# Patient Record
Sex: MALE | Race: WHITE | Employment: OTHER | URBAN - METROPOLITAN AREA
[De-identification: names, ages, dates, MRNs, and addresses within clinical notes are randomized per-mention and may not be internally consistent; named-entity substitution may affect disease eponyms.]

---

## 2017-03-13 ENCOUNTER — HOSPITAL ENCOUNTER (INPATIENT)
Age: 72
LOS: 4 days | Discharge: NURSING, REHAB OR LONG-TERM CARE | DRG: 291 | End: 2017-03-17
Attending: EMERGENCY MEDICINE | Admitting: INTERNAL MEDICINE
Payer: MEDICARE

## 2017-03-13 ENCOUNTER — APPOINTMENT (OUTPATIENT)
Dept: GENERAL RADIOLOGY | Age: 72
DRG: 291 | End: 2017-03-13
Attending: EMERGENCY MEDICINE
Payer: MEDICARE

## 2017-03-13 ENCOUNTER — APPOINTMENT (OUTPATIENT)
Dept: CT IMAGING | Age: 72
DRG: 291 | End: 2017-03-13
Attending: EMERGENCY MEDICINE
Payer: MEDICARE

## 2017-03-13 DIAGNOSIS — I67.89 CEREBRAL MICROVASCULAR DISEASE: ICD-10-CM

## 2017-03-13 DIAGNOSIS — J44.9 COPD WITH HYPOXIA (HCC): Primary | ICD-10-CM

## 2017-03-13 DIAGNOSIS — I50.43 ACUTE ON CHRONIC COMBINED SYSTOLIC AND DIASTOLIC CONGESTIVE HEART FAILURE (HCC): ICD-10-CM

## 2017-03-13 DIAGNOSIS — R53.1 GENERALIZED WEAKNESS: ICD-10-CM

## 2017-03-13 DIAGNOSIS — R41.0 DISORIENTATION: ICD-10-CM

## 2017-03-13 DIAGNOSIS — J90 BILATERAL PLEURAL EFFUSION: ICD-10-CM

## 2017-03-13 DIAGNOSIS — R09.02 COPD WITH HYPOXIA (HCC): Primary | ICD-10-CM

## 2017-03-13 DIAGNOSIS — R53.81 DEBILITY: ICD-10-CM

## 2017-03-13 DIAGNOSIS — I65.23 STENOSIS OF BOTH INTERNAL CAROTID ARTERIES: ICD-10-CM

## 2017-03-13 LAB
ALBUMIN SERPL BCP-MCNC: 2.9 G/DL (ref 3.5–5)
ALBUMIN/GLOB SERPL: 1 {RATIO} (ref 1.1–2.2)
ALP SERPL-CCNC: 78 U/L (ref 45–117)
ALT SERPL-CCNC: 37 U/L (ref 12–78)
ANION GAP BLD CALC-SCNC: 5 MMOL/L (ref 5–15)
APPEARANCE UR: CLEAR
APTT PPP: 27.3 SEC (ref 22.1–32.5)
AST SERPL W P-5'-P-CCNC: 28 U/L (ref 15–37)
BACTERIA URNS QL MICRO: ABNORMAL /HPF
BASOPHILS # BLD AUTO: 0 K/UL (ref 0–0.1)
BASOPHILS # BLD: 0 % (ref 0–1)
BILIRUB SERPL-MCNC: 0.4 MG/DL (ref 0.2–1)
BILIRUB UR QL: NEGATIVE
BNP SERPL-MCNC: 9930 PG/ML (ref 0–125)
BUN SERPL-MCNC: 38 MG/DL (ref 6–20)
BUN/CREAT SERPL: 27 (ref 12–20)
CALCIUM SERPL-MCNC: 8.6 MG/DL (ref 8.5–10.1)
CHLORIDE SERPL-SCNC: 104 MMOL/L (ref 97–108)
CK SERPL-CCNC: 87 U/L (ref 39–308)
CO2 SERPL-SCNC: 34 MMOL/L (ref 21–32)
COLOR UR: ABNORMAL
CREAT SERPL-MCNC: 1.4 MG/DL (ref 0.7–1.3)
EOSINOPHIL # BLD: 0 K/UL (ref 0–0.4)
EOSINOPHIL NFR BLD: 0 % (ref 0–7)
EPITH CASTS URNS QL MICRO: ABNORMAL /LPF
ERYTHROCYTE [DISTWIDTH] IN BLOOD BY AUTOMATED COUNT: 16.5 % (ref 11.5–14.5)
GLOBULIN SER CALC-MCNC: 2.9 G/DL (ref 2–4)
GLUCOSE BLD STRIP.AUTO-MCNC: 214 MG/DL (ref 65–100)
GLUCOSE SERPL-MCNC: 240 MG/DL (ref 65–100)
GLUCOSE UR STRIP.AUTO-MCNC: NEGATIVE MG/DL
GRAN CASTS URNS QL MICRO: ABNORMAL /LPF
HCT VFR BLD AUTO: 39.1 % (ref 36.6–50.3)
HGB BLD-MCNC: 11.9 G/DL (ref 12.1–17)
HGB UR QL STRIP: NEGATIVE
HYALINE CASTS URNS QL MICRO: ABNORMAL /LPF (ref 0–5)
INR PPP: 1.2 (ref 0.9–1.1)
KETONES UR QL STRIP.AUTO: NEGATIVE MG/DL
LEUKOCYTE ESTERASE UR QL STRIP.AUTO: NEGATIVE
LYMPHOCYTES # BLD AUTO: 10 % (ref 12–49)
LYMPHOCYTES # BLD: 0.9 K/UL (ref 0.8–3.5)
MAGNESIUM SERPL-MCNC: 2.1 MG/DL (ref 1.6–2.4)
MCH RBC QN AUTO: 30.7 PG (ref 26–34)
MCHC RBC AUTO-ENTMCNC: 30.4 G/DL (ref 30–36.5)
MCV RBC AUTO: 100.8 FL (ref 80–99)
MONOCYTES # BLD: 0.8 K/UL (ref 0–1)
MONOCYTES NFR BLD AUTO: 9 % (ref 5–13)
NEUTS SEG # BLD: 6.8 K/UL (ref 1.8–8)
NEUTS SEG NFR BLD AUTO: 81 % (ref 32–75)
NITRITE UR QL STRIP.AUTO: NEGATIVE
PH UR STRIP: 5 [PH] (ref 5–8)
PLATELET # BLD AUTO: 200 K/UL (ref 150–400)
POTASSIUM SERPL-SCNC: 5.7 MMOL/L (ref 3.5–5.1)
PROT SERPL-MCNC: 5.8 G/DL (ref 6.4–8.2)
PROT UR STRIP-MCNC: ABNORMAL MG/DL
PROTHROMBIN TIME: 12.3 SEC (ref 9–11.1)
RBC # BLD AUTO: 3.88 M/UL (ref 4.1–5.7)
RBC #/AREA URNS HPF: ABNORMAL /HPF (ref 0–5)
RHEUMATOID FACT SERPL-ACNC: <10 IU/ML
SERVICE CMNT-IMP: ABNORMAL
SODIUM SERPL-SCNC: 143 MMOL/L (ref 136–145)
SP GR UR REFRACTOMETRY: 1.02 (ref 1–1.03)
THERAPEUTIC RANGE,PTTT: NORMAL SECS (ref 58–77)
TROPONIN I SERPL-MCNC: 0.08 NG/ML
TROPONIN I SERPL-MCNC: 0.1 NG/ML
UROBILINOGEN UR QL STRIP.AUTO: 0.2 EU/DL (ref 0.2–1)
WBC # BLD AUTO: 8.4 K/UL (ref 4.1–11.1)
WBC URNS QL MICRO: ABNORMAL /HPF (ref 0–4)

## 2017-03-13 PROCEDURE — 74011250637 HC RX REV CODE- 250/637: Performed by: EMERGENCY MEDICINE

## 2017-03-13 PROCEDURE — 81001 URINALYSIS AUTO W/SCOPE: CPT | Performed by: EMERGENCY MEDICINE

## 2017-03-13 PROCEDURE — 99285 EMERGENCY DEPT VISIT HI MDM: CPT

## 2017-03-13 PROCEDURE — 85730 THROMBOPLASTIN TIME PARTIAL: CPT | Performed by: EMERGENCY MEDICINE

## 2017-03-13 PROCEDURE — 83735 ASSAY OF MAGNESIUM: CPT | Performed by: EMERGENCY MEDICINE

## 2017-03-13 PROCEDURE — 80053 COMPREHEN METABOLIC PANEL: CPT | Performed by: EMERGENCY MEDICINE

## 2017-03-13 PROCEDURE — 96374 THER/PROPH/DIAG INJ IV PUSH: CPT

## 2017-03-13 PROCEDURE — 70450 CT HEAD/BRAIN W/O DYE: CPT

## 2017-03-13 PROCEDURE — 86431 RHEUMATOID FACTOR QUANT: CPT | Performed by: EMERGENCY MEDICINE

## 2017-03-13 PROCEDURE — 74011250636 HC RX REV CODE- 250/636: Performed by: INTERNAL MEDICINE

## 2017-03-13 PROCEDURE — 82962 GLUCOSE BLOOD TEST: CPT

## 2017-03-13 PROCEDURE — 82550 ASSAY OF CK (CPK): CPT | Performed by: EMERGENCY MEDICINE

## 2017-03-13 PROCEDURE — 85025 COMPLETE CBC W/AUTO DIFF WBC: CPT | Performed by: EMERGENCY MEDICINE

## 2017-03-13 PROCEDURE — 65660000000 HC RM CCU STEPDOWN

## 2017-03-13 PROCEDURE — 71010 XR CHEST PORT: CPT

## 2017-03-13 PROCEDURE — 86038 ANTINUCLEAR ANTIBODIES: CPT | Performed by: EMERGENCY MEDICINE

## 2017-03-13 PROCEDURE — 93005 ELECTROCARDIOGRAM TRACING: CPT

## 2017-03-13 PROCEDURE — 84484 ASSAY OF TROPONIN QUANT: CPT | Performed by: EMERGENCY MEDICINE

## 2017-03-13 PROCEDURE — 74011000250 HC RX REV CODE- 250: Performed by: EMERGENCY MEDICINE

## 2017-03-13 PROCEDURE — 85610 PROTHROMBIN TIME: CPT | Performed by: EMERGENCY MEDICINE

## 2017-03-13 PROCEDURE — 74011250636 HC RX REV CODE- 250/636: Performed by: EMERGENCY MEDICINE

## 2017-03-13 PROCEDURE — 83880 ASSAY OF NATRIURETIC PEPTIDE: CPT | Performed by: EMERGENCY MEDICINE

## 2017-03-13 PROCEDURE — 74011000250 HC RX REV CODE- 250: Performed by: INTERNAL MEDICINE

## 2017-03-13 PROCEDURE — 36415 COLL VENOUS BLD VENIPUNCTURE: CPT | Performed by: EMERGENCY MEDICINE

## 2017-03-13 PROCEDURE — 77030029684 HC NEB SM VOL KT MONA -A

## 2017-03-13 RX ORDER — FUROSEMIDE 40 MG/1
20 TABLET ORAL DAILY
Status: DISCONTINUED | OUTPATIENT
Start: 2017-03-14 | End: 2017-03-13

## 2017-03-13 RX ORDER — METOPROLOL TARTRATE 25 MG/1
12.5 TABLET, FILM COATED ORAL 2 TIMES DAILY
Status: DISCONTINUED | OUTPATIENT
Start: 2017-03-14 | End: 2017-03-14

## 2017-03-13 RX ORDER — INSULIN LISPRO 100 [IU]/ML
INJECTION, SOLUTION INTRAVENOUS; SUBCUTANEOUS
Status: DISCONTINUED | OUTPATIENT
Start: 2017-03-13 | End: 2017-03-17 | Stop reason: HOSPADM

## 2017-03-13 RX ORDER — ENOXAPARIN SODIUM 100 MG/ML
40 INJECTION SUBCUTANEOUS EVERY 24 HOURS
Status: DISCONTINUED | OUTPATIENT
Start: 2017-03-13 | End: 2017-03-17 | Stop reason: HOSPADM

## 2017-03-13 RX ORDER — NALOXONE HYDROCHLORIDE 0.4 MG/ML
0.4 INJECTION, SOLUTION INTRAMUSCULAR; INTRAVENOUS; SUBCUTANEOUS AS NEEDED
Status: DISCONTINUED | OUTPATIENT
Start: 2017-03-13 | End: 2017-03-17 | Stop reason: HOSPADM

## 2017-03-13 RX ORDER — LANOLIN ALCOHOL/MO/W.PET/CERES
400 CREAM (GRAM) TOPICAL DAILY
Status: DISCONTINUED | OUTPATIENT
Start: 2017-03-14 | End: 2017-03-14

## 2017-03-13 RX ORDER — MEXILETINE HYDROCHLORIDE 150 MG/1
150 CAPSULE ORAL 2 TIMES DAILY
COMMUNITY

## 2017-03-13 RX ORDER — METFORMIN HYDROCHLORIDE 500 MG/1
TABLET ORAL 2 TIMES DAILY WITH MEALS
Status: ON HOLD | COMMUNITY
End: 2017-03-14 | Stop reason: CLARIF

## 2017-03-13 RX ORDER — ACETAMINOPHEN 325 MG/1
650 TABLET ORAL
Status: DISCONTINUED | OUTPATIENT
Start: 2017-03-13 | End: 2017-03-17 | Stop reason: HOSPADM

## 2017-03-13 RX ORDER — SODIUM POLYSTYRENE SULFONATE 15 G/60ML
15 SUSPENSION ORAL; RECTAL
Status: COMPLETED | OUTPATIENT
Start: 2017-03-13 | End: 2017-03-13

## 2017-03-13 RX ORDER — ASCORBIC ACID 1500 MG
1500 TABLET, EXTENDED RELEASE ORAL 2 TIMES DAILY
COMMUNITY

## 2017-03-13 RX ORDER — AMIODARONE HYDROCHLORIDE 200 MG/1
200 TABLET ORAL DAILY
Status: DISCONTINUED | OUTPATIENT
Start: 2017-03-14 | End: 2017-03-17 | Stop reason: HOSPADM

## 2017-03-13 RX ORDER — QUINIDINE GLUCONATE 324 MG/1
324 TABLET, EXTENDED RELEASE ORAL DAILY
Status: DISCONTINUED | OUTPATIENT
Start: 2017-03-14 | End: 2017-03-14

## 2017-03-13 RX ORDER — HYDROCODONE BITARTRATE AND ACETAMINOPHEN 7.5; 325 MG/1; MG/1
1 TABLET ORAL
Status: DISCONTINUED | OUTPATIENT
Start: 2017-03-13 | End: 2017-03-14

## 2017-03-13 RX ORDER — FUROSEMIDE 10 MG/ML
20 INJECTION INTRAMUSCULAR; INTRAVENOUS EVERY 12 HOURS
Status: DISCONTINUED | OUTPATIENT
Start: 2017-03-14 | End: 2017-03-15

## 2017-03-13 RX ORDER — MEXILETINE HYDROCHLORIDE 150 MG/1
150 CAPSULE ORAL 2 TIMES DAILY
Status: DISCONTINUED | OUTPATIENT
Start: 2017-03-14 | End: 2017-03-17 | Stop reason: HOSPADM

## 2017-03-13 RX ORDER — ONDANSETRON 2 MG/ML
4 INJECTION INTRAMUSCULAR; INTRAVENOUS
Status: DISCONTINUED | OUTPATIENT
Start: 2017-03-13 | End: 2017-03-17 | Stop reason: HOSPADM

## 2017-03-13 RX ORDER — CHOLECALCIFEROL (VITAMIN D3) 125 MCG
1 CAPSULE ORAL DAILY
Status: ON HOLD | COMMUNITY
End: 2017-03-14

## 2017-03-13 RX ORDER — PANTOPRAZOLE SODIUM 40 MG/1
40 TABLET, DELAYED RELEASE ORAL
Status: DISCONTINUED | OUTPATIENT
Start: 2017-03-14 | End: 2017-03-14

## 2017-03-13 RX ORDER — GLIMEPIRIDE 4 MG/1
4 TABLET ORAL
Status: DISCONTINUED | OUTPATIENT
Start: 2017-03-14 | End: 2017-03-13

## 2017-03-13 RX ORDER — QUINIDINE GLUCONATE 324 MG/1
648 TABLET, EXTENDED RELEASE ORAL 2 TIMES DAILY
COMMUNITY
End: 2017-03-17

## 2017-03-13 RX ORDER — BISACODYL 5 MG
5 TABLET, DELAYED RELEASE (ENTERIC COATED) ORAL DAILY PRN
Status: DISCONTINUED | OUTPATIENT
Start: 2017-03-13 | End: 2017-03-17 | Stop reason: HOSPADM

## 2017-03-13 RX ORDER — MELATONIN
2000 DAILY
Status: DISCONTINUED | OUTPATIENT
Start: 2017-03-14 | End: 2017-03-14

## 2017-03-13 RX ORDER — GLIMEPIRIDE 4 MG/1
4 TABLET ORAL
COMMUNITY

## 2017-03-13 RX ORDER — HYDROCODONE BITARTRATE AND ACETAMINOPHEN 7.5; 325 MG/1; MG/1
1 TABLET ORAL
Status: ON HOLD | COMMUNITY
End: 2017-03-14

## 2017-03-13 RX ORDER — FUROSEMIDE 10 MG/ML
20 INJECTION INTRAMUSCULAR; INTRAVENOUS
Status: COMPLETED | OUTPATIENT
Start: 2017-03-13 | End: 2017-03-13

## 2017-03-13 RX ORDER — ALBUTEROL SULFATE 0.83 MG/ML
5 SOLUTION RESPIRATORY (INHALATION)
Status: COMPLETED | OUTPATIENT
Start: 2017-03-13 | End: 2017-03-13

## 2017-03-13 RX ORDER — DEXTROSE 50 % IN WATER (D50W) INTRAVENOUS SYRINGE
12.5-25 AS NEEDED
Status: DISCONTINUED | OUTPATIENT
Start: 2017-03-13 | End: 2017-03-17 | Stop reason: HOSPADM

## 2017-03-13 RX ORDER — LISINOPRIL 2.5 MG/1
2.5 TABLET ORAL DAILY
COMMUNITY
End: 2017-03-17

## 2017-03-13 RX ORDER — SODIUM CHLORIDE 0.9 % (FLUSH) 0.9 %
5-10 SYRINGE (ML) INJECTION EVERY 8 HOURS
Status: DISCONTINUED | OUTPATIENT
Start: 2017-03-13 | End: 2017-03-17 | Stop reason: HOSPADM

## 2017-03-13 RX ORDER — FUROSEMIDE 10 MG/ML
20 INJECTION INTRAMUSCULAR; INTRAVENOUS EVERY 12 HOURS
Status: DISCONTINUED | OUTPATIENT
Start: 2017-03-13 | End: 2017-03-13 | Stop reason: SDUPTHER

## 2017-03-13 RX ORDER — LISINOPRIL 5 MG/1
2.5 TABLET ORAL DAILY
Status: DISCONTINUED | OUTPATIENT
Start: 2017-03-14 | End: 2017-03-13

## 2017-03-13 RX ORDER — AMIODARONE HYDROCHLORIDE 200 MG/1
200 TABLET ORAL DAILY
COMMUNITY

## 2017-03-13 RX ORDER — BISMUTH SUBSALICYLATE 262 MG
1 TABLET,CHEWABLE ORAL DAILY
COMMUNITY

## 2017-03-13 RX ORDER — FUROSEMIDE 20 MG/1
20 TABLET ORAL DAILY
COMMUNITY

## 2017-03-13 RX ORDER — SODIUM CHLORIDE 0.9 % (FLUSH) 0.9 %
5-10 SYRINGE (ML) INJECTION AS NEEDED
Status: DISCONTINUED | OUTPATIENT
Start: 2017-03-13 | End: 2017-03-17 | Stop reason: HOSPADM

## 2017-03-13 RX ORDER — ESOMEPRAZOLE MAGNESIUM 40 MG/1
40 CAPSULE, DELAYED RELEASE ORAL DAILY
Status: ON HOLD | COMMUNITY
End: 2017-03-14

## 2017-03-13 RX ORDER — GUAIFENESIN 100 MG/5ML
81 LIQUID (ML) ORAL DAILY
Status: DISCONTINUED | OUTPATIENT
Start: 2017-03-14 | End: 2017-03-17 | Stop reason: HOSPADM

## 2017-03-13 RX ORDER — IPRATROPIUM BROMIDE AND ALBUTEROL SULFATE 2.5; .5 MG/3ML; MG/3ML
3 SOLUTION RESPIRATORY (INHALATION)
Status: DISCONTINUED | OUTPATIENT
Start: 2017-03-13 | End: 2017-03-15

## 2017-03-13 RX ORDER — MAGNESIUM SULFATE 100 %
4 CRYSTALS MISCELLANEOUS AS NEEDED
Status: DISCONTINUED | OUTPATIENT
Start: 2017-03-13 | End: 2017-03-17 | Stop reason: HOSPADM

## 2017-03-13 RX ORDER — LANOLIN ALCOHOL/MO/W.PET/CERES
800 CREAM (GRAM) TOPICAL DAILY
Status: ON HOLD | COMMUNITY
End: 2017-03-14 | Stop reason: SDDI

## 2017-03-13 RX ORDER — GUAIFENESIN 100 MG/5ML
81 LIQUID (ML) ORAL DAILY
COMMUNITY

## 2017-03-13 RX ORDER — VITAMIN E 268 MG
400 CAPSULE ORAL DAILY
COMMUNITY

## 2017-03-13 RX ADMIN — ALBUTEROL SULFATE 5 MG: 2.5 SOLUTION RESPIRATORY (INHALATION) at 17:07

## 2017-03-13 RX ADMIN — ENOXAPARIN SODIUM 40 MG: 40 INJECTION SUBCUTANEOUS at 23:22

## 2017-03-13 RX ADMIN — INSULIN LISPRO 2 UNITS: 100 INJECTION, SOLUTION INTRAVENOUS; SUBCUTANEOUS at 23:21

## 2017-03-13 RX ADMIN — SODIUM POLYSTYRENE SULFONATE 15 G: 15 SUSPENSION ORAL; RECTAL at 19:24

## 2017-03-13 RX ADMIN — IPRATROPIUM BROMIDE AND ALBUTEROL SULFATE 3 ML: .5; 3 SOLUTION RESPIRATORY (INHALATION) at 23:24

## 2017-03-13 RX ADMIN — FUROSEMIDE 20 MG: 10 INJECTION, SOLUTION INTRAMUSCULAR; INTRAVENOUS at 19:29

## 2017-03-13 NOTE — ED PROVIDER NOTES
HPI Comments: Vladislav Melissa is a 66 y.o. male with PMHx significant for heart failure, DM, COPD, and asthma who presents via EMS to the ED c/o fatigue and weakness x ~3 weeks after being discharged from [facility] [place] [facility]. Pt presents with additional sx's of diarrhea x 2 days and hallucinations  x 4-5 months. EMS reports that pt currently has a defibrillator placed that is made by [employer]. Pt notes that he has not felt his defibrillator go off in the past 6-8 months. Pt reports that his last episode of diarrhea was yesterday, and states that he has not had a bowel movement today PTA. At baseline, pt reports that he has trouble walking, and is dependent on support for movement. Pt also reports that he saw \"a young girl who wouldn't leave his house\" this morning at home PTA. Pt reports that there was nobody else in the house during that time, and notes that he has seen this \"girl\" ~15 times in the last 4-5 months in different episodes of hallucinations. Pt also notes that he only sees the girl during the [place] morning\" time period of the day. Pt specifically denies cough, fever, chills, chest pain, dysuria, hematuria, frequency, headache, ABD pain, leg pain, and back pain. PCP: Kathryn Guzman NP  Social Hx: (-) tobacco usage, (+) EtOH intake       There are no other complaints, changes or physical findings at this time. The history is provided by the patient and the EMS personnel. No  was used. Past Medical History:   Diagnosis Date    Asthma     COPD    [address] [address]     Heart failure ([facility] [address]        Past Surgical History:   Procedure Laterality Date    HX PACEMAKER           History reviewed. No pertinent family history. Social History     Social History    Marital status: SINGLE     Spouse name: N/A    Number of children: N/A    Years of education: N/A     Occupational History    Not on file.      Social History Main Topics    Smoking status: Former Smoker    Smokeless tobacco: Not on file    Alcohol use No    Drug use: No    Sexual activity: Not on file     Other Topics Concern    Not on file     Social History Narrative    No narrative on file         ALLERGIES: Review of patient's allergies indicates no known allergies. Review of Systems   Constitutional: Positive for fatigue. Negative for chills and fever. HENT: Negative for congestion. Eyes: Negative. Respiratory: Negative for cough. Cardiovascular: Negative for chest pain. Gastrointestinal: Positive for diarrhea. Negative for abdominal pain. Endocrine: Negative for heat intolerance. Genitourinary: Negative. Negative for dysuria, frequency and hematuria. Musculoskeletal: Negative for back pain and myalgias. Skin: Negative for rash. Allergic/Immunologic: Negative for immunocompromised state. Neurological: Positive for weakness. Negative for headaches. Hematological: Does not bruise/bleed easily. Psychiatric/Behavioral: Positive for hallucinations. All other systems reviewed and are negative. Patient Vitals for the past 12 hrs:   Temp Pulse Resp BP SpO2   03/13/17 2100 - 71 18 124/63 95 %   03/13/17 2045 - 69 20 124/50 98 %   03/13/17 2030 - 73 18 122/74 96 %   03/13/17 2000 - 70 19 115/63 94 %   03/13/17 1945 - 70 19 115/62 97 %   03/13/17 1930 - 72 12 116/63 98 %   03/13/17 1929 - 71 - 114/58 -   03/13/17 1845 - 69 19 113/57 98 %   03/13/17 1815 - 71 18 111/66 96 %   03/13/17 1800 - 69 16 118/64 96 %   03/13/17 1745 - 68 15 117/64 99 %   03/13/17 1715 - 64 19 110/62 99 %   03/13/17 1707 - 64 15 111/64 99 %   03/13/17 1615 - 66 20 110/59 96 %   03/13/17 1545 - 67 21 105/60 96 %   03/13/17 1540 - - - - (!) 88 %   03/13/17 1530 - 70 22 105/58 90 %   03/13/17 1510 97.6 °F (36.4 °C) 65 18 107/60 91 %        Physical Exam   Constitutional: He is oriented to person, place, and time. He appears well-developed and well-nourished. No distress.    HENT:   Head: Normocephalic and atraumatic. Poor dentition. Eyes: EOM are normal. Pupils are equal, round, and reactive to light. Neck: Normal range of motion. Neck supple. No tenderness in the neck. Cardiovascular: Normal rate and regular rhythm. Murmur heard. Systolic murmur is present with a grade of 2/6   Pulmonary/Chest: Effort normal and breath sounds normal. He has no wheezes. Abdominal: Soft. Bowel sounds are normal. There is no tenderness. Musculoskeletal:   Trace edema in BL legs with no tenderness. Decreased ROM in BL legs. Neurological: He is alert and oriented to person, place, and time. No cranial nerve deficit. Pt has equal  in BL hands. Skin: Skin is warm and dry. Psychiatric: He has a normal mood and affect. Nursing note and vitals reviewed. MDM  Number of Diagnoses or Management Options  Bilateral pleural effusion:   COPD with hypoxia [facility]):   Diagnosis management comments:     DDx: dehydration, electrolyte abnormality, anemia, CHF, COPD, pneumonia, CAD, UTI       Amount and/or Complexity of Data Reviewed  Clinical lab tests: ordered and reviewed  Tests in the radiology section of CPT®: ordered and reviewed  Tests in the medicine section of CPT®: ordered and reviewed  Obtain history from someone other than the patient: yes (EMS)  Review and summarize past medical records: yes  Discuss the patient with other providers: yes (Cardiology, hospitalist)  Independent visualization of images, tracings, or specimens: yes    Critical Care  Total time providing critical care: 30-74 minutes    Patient Progress  Patient progress: stable    ED Course       Procedures      EKG interpretation: (Preliminary) at 1524 by Walker Fleming MD  Rhythm: Ventricular paced rhythm; and irregular. Rate (approx.): 67; Axis: normal; WY interval: normal; QRS interval: normal ; ST/T wave: normal; Other findings: no prior EKGs.       PROGRESS NOTE  3:53 PM  Per notes from [facility], pt was admitted to [facility] on 2/22/17 with ventricular tachycardia. EF was 35-40%. Pt is a former smoker. At [facility], pt had a normal kidney function test. CMP was also normal. Hemoglobin was 9.4 on 3/1/17. Pt also reports that he had a cardiac ablation in early January 2017. Written by RAMÓN Johnson, as dictated by Chloe Hillman MD.    CONSULT NOTE:   4:30 PM  Chloe Hillman MD spoke with ,  Specialty: cardiology  Discussed pt's hx, disposition, and available diagnostic and imaging results. Reviewed care plans. Consultant agrees with plans as outlined. Consultant recommends to check JUANITA and rheumatoid factors as pt has been on quinidine for a long time, which could contribute to pt's sx of weakness. Written by RAMÓN Johnson, as dictated by Chloe Hillman MD.    PROGRESS NOTE  8:00 PM  Pt was re-assessed. Pt's saturation levels are better. Pt states that he is unable to stand up. Written by RAMÓN Johnson, as dictated by Chloe Hillman MD.    CONSULT NOTE:   8:00 PM  Chloe Hillman MD spoke with Nilton Crum,   Specialty: Hospitalist  Discussed pt's hx, disposition, and available diagnostic and imaging results. Reviewed care plans. Consultant will admit pt. Written by RAMÓN Johnson, as dictated by Chloe Hillman MD.    CRITICAL CARE NOTE :    8:00 PM    IMPENDING DETERIORATION -Respiratory, Metabolic and Renal    ASSOCIATED RISK FACTORS - Hypoxia, Dysrhythmia, Metabolic changes and Dehydration    MANAGEMENT- Bedside Assessment and Supervision of Care    INTERPRETATION -  Xrays, CT Scan, ECG and Blood Pressure    INTERVENTIONS - hemodynamic mngmt and Metobolic interventions    CASE REVIEW - Hospitalist, Medical Sub-Specialist, Nursing and Family    TREATMENT RESPONSE -Improved    PERFORMED BY - Self        NOTES   :      I have spent 45 minutes of critical care time involved in lab review, consultations with specialist, family decision- making, bedside attention and documentation.  During this entire length of time I was immediately available to the patient . Jaylin Man MD    LABORATORY TESTS:  Recent Results (from the past 12 hour(s))   EKG, 12 LEAD, INITIAL    Collection Time: 03/13/17  3:24 PM   Result Value Ref Range    Ventricular Rate 67 BPM    Atrial Rate 67 BPM    QRS Duration 190 ms    Q-T Interval 532 ms    QTC Calculation (Bezet) 562 ms    Calculated P Axis -6 degrees    Calculated R Axis 158 degrees    Calculated T Axis -26 degrees    Diagnosis       Ventricular-paced rhythm  Abnormal ECG  When compared with ECG of 31-AUG-2007 07:03,  Previous ECG has undetermined rhythm, needs review     CBC WITH AUTOMATED DIFF    Collection Time: 03/13/17  4:04 PM   Result Value Ref Range    WBC 8.4 4.1 - 11.1 K/uL    RBC 3.88 (L) 4.10 - 5.70 M/uL    HGB 11.9 (L) 12.1 - 17.0 g/dL    HCT 39.1 36.6 - 50.3 %    .8 (H) 80.0 - 99.0 FL    MCH 30.7 26.0 - 34.0 PG    MCHC 30.4 30.0 - 36.5 g/dL    RDW 16.5 (H) 11.5 - 14.5 %    PLATELET 201 852 - 868 K/uL    NEUTROPHILS 81 (H) 32 - 75 %    LYMPHOCYTES 10 (L) 12 - 49 %    MONOCYTES 9 5 - 13 %    EOSINOPHILS 0 0 - 7 %    BASOPHILS 0 0 - 1 %    ABS. NEUTROPHILS 6.8 1.8 - 8.0 K/UL    ABS. LYMPHOCYTES 0.9 0.8 - 3.5 K/UL    ABS. MONOCYTES 0.8 0.0 - 1.0 K/UL    ABS. EOSINOPHILS 0.0 0.0 - 0.4 K/UL    ABS. BASOPHILS 0.0 0.0 - 0.1 K/UL   METABOLIC PANEL, COMPREHENSIVE    Collection Time: 03/13/17  4:04 PM   Result Value Ref Range    Sodium 143 136 - 145 mmol/L    Potassium 5.7 (H) 3.5 - 5.1 mmol/L    Chloride 104 97 - 108 mmol/L    CO2 34 (H) 21 - 32 mmol/L    Anion gap 5 5 - 15 mmol/L    Glucose 240 (H) 65 - 100 mg/dL    BUN 38 (H) 6 - 20 MG/DL    Creatinine 1.40 (H) 0.70 - 1.30 MG/DL    BUN/Creatinine ratio 27 (H) 12 - 20      GFR est AA >60 >60 ml/min/1.73m2    GFR est non-AA 50 (L) >60 ml/min/1.73m2    Calcium 8.6 8.5 - 10.1 MG/DL    Bilirubin, total 0.4 0.2 - 1.0 MG/DL    ALT (SGPT) 37 12 - 78 U/L    AST (SGOT) 28 15 - 37 U/L    Alk.  phosphatase 78 45 - 117 U/L    Protein, total 5.8 (L) 6.4 - 8.2 g/dL    Albumin 2.9 (L) 3.5 - 5.0 g/dL    Globulin 2.9 2.0 - 4.0 g/dL    A-G Ratio 1.0 (L) 1.1 - 2.2     CK    Collection Time: 03/13/17  4:04 PM   Result Value Ref Range    CK 87 39 - 308 U/L   TROPONIN I    Collection Time: 03/13/17  4:04 PM   Result Value Ref Range    Troponin-I, Qt. 0.10 (H) <0.05 ng/mL   PRO-BNP    Collection Time: 03/13/17  4:04 PM   Result Value Ref Range    NT pro-BNP 9930 (H) 0 - 125 PG/ML   MAGNESIUM    Collection Time: 03/13/17  4:04 PM   Result Value Ref Range    Magnesium 2.1 1.6 - 2.4 mg/dL   PROTHROMBIN TIME + INR    Collection Time: 03/13/17  5:58 PM   Result Value Ref Range    INR 1.2 (H) 0.9 - 1.1      Prothrombin time 12.3 (H) 9.0 - 11.1 sec   PTT    Collection Time: 03/13/17  5:58 PM   Result Value Ref Range    aPTT 27.3 22.1 - 32.5 sec    aPTT, therapeutic range     58.0 - 77.0 SECS   TROPONIN I    Collection Time: 03/13/17  5:58 PM   Result Value Ref Range    Troponin-I, Qt. 0.08 (H) <0.05 ng/mL   URINALYSIS W/ RFLX MICROSCOPIC    Collection Time: 03/13/17  7:05 PM   Result Value Ref Range    Color YELLOW/STRAW      Appearance CLEAR CLEAR      Specific gravity 1.018 1.003 - 1.030      pH (UA) 5.0 5.0 - 8.0      Protein TRACE (A) NEG mg/dL    Glucose NEGATIVE  NEG mg/dL    Ketone NEGATIVE  NEG mg/dL    Bilirubin NEGATIVE  NEG      Blood NEGATIVE  NEG      Urobilinogen 0.2 0.2 - 1.0 EU/dL    Nitrites NEGATIVE  NEG      Leukocyte Esterase NEGATIVE  NEG      WBC 0-4 0 - 4 /hpf    RBC 0-5 0 - 5 /hpf    Epithelial cells FEW FEW /lpf    Bacteria 1+ (A) NEG /hpf    Hyaline cast 2-5 0 - 5 /lpf    Granular cast 0-2 (A) NEG /lpf       IMAGING RESULTS:  XR CHEST PORT   Final Result   EXAM: XR CHEST PORT     INDICATION: Chest Pain     COMPARISON: 9/1/2007     FINDINGS: 2 AP portable radiographs of the chest were obtained at 1828 hours. Small layering bilateral pleural effusions are shown.  There is pulmonary venous  congestion with minimal bibasilar patchy pulmonary opacification, greater on the  left, of nonspecific nature. There is no pneumothorax. Mild cardiac contour  enlargement is shown. No mediastinal or hilar enlargement is demonstrated. A  left axillary AICD device is again demonstrated appearing unchanged.      IMPRESSION  IMPRESSION: Small bilateral pleural effusions. Nonspecific ill-defined bibasilar  pulmonary densities, greater on the left. CT HEAD WO CONT   Final Result   INDICATION: Fatigue, weakness, presentation to emergency department.     COMPARISON: None     EXAM: Unenhanced axial CT imaging of the head is obtained with coronal and  sagittal reformations. CT dose reduction was achieved through use of a  standardized protocol tailored for this examination and automatic exposure  control for dose modulation.      FINDINGS:     There is mild prominence of the ventricles and cortical sulci consistent with  atrophy. Minimal-mild bilateral cerebral periventricular diminished attenuation  is shown consistent with chronic small vessel ischemic disease of the white  matter. There is no unenhanced CT imaging evidence for acute infarction, mass or  intracranial hemorrhage. No extra-axial collection is shown. No osseous  abnormality is demonstrated. The orbits, visualized paranasal sinuses and  mastoid air cells appear normal.     IMPRESSION  IMPRESSION: Mild atrophy. No acute findings.        MEDICATIONS GIVEN:  Medications   sodium chloride (NS) flush 5-10 mL (not administered)   sodium chloride (NS) flush 5-10 mL (not administered)   acetaminophen (TYLENOL) tablet 650 mg (not administered)   naloxone (NARCAN) injection 0.4 mg (not administered)   ondansetron (ZOFRAN) injection 4 mg (not administered)   bisacodyl (DULCOLAX) tablet 5 mg (not administered)   enoxaparin (LOVENOX) injection 40 mg (not administered)   aspirin chewable tablet 81 mg (not administered)   amiodarone (CORDARONE) tablet 200 mg (not administered)   cholecalciferol (VITAMIN D3) tablet 2,000 Units (not administered)   pantoprazole (PROTONIX) tablet 40 mg (not administered)   glimepiride (AMARYL) tablet 4 mg (not administered)   HYDROcodone-acetaminophen (NORCO) 7.5-325 mg per tablet 1 Tab (not administered)   lisinopril (PRINIVIL, ZESTRIL) tablet 2.5 mg (not administered)   magnesium oxide (MAG-OX) tablet 400 mg (not administered)   metoprolol tartrate (LOPRESSOR) tablet 12.5 mg (not administered)   mexiletine (MEXITIL) capsule 150 mg (not administered)   quiNIDine gluconate SR (DURA-TABS) 324 mg tablet 324 mg (not administered)   salmeterol (SEREVENT) 50 mcg inhaler (not administered)   linagliptin (TRADJENTA) tablet 5 mg (not administered)   insulin lispro (HUMALOG) injection (not administered)   glucose chewable tablet 16 g (not administered)   dextrose (D50W) injection syrg 12.5-25 g (not administered)   glucagon (GLUCAGEN) injection 1 mg (not administered)   furosemide (LASIX) injection 20 mg (not administered)   albuterol-ipratropium (DUO-NEB) 2.5 MG-0.5 MG/3 ML (not administered)   albuterol (PROVENTIL VENTOLIN) nebulizer solution 5 mg (5 mg Nebulization Given 3/13/17 1707)   sodium polystyrene (KAYEXALATE) 15 gram/60 mL oral suspension 15 g (15 g Oral Given 3/13/17 1924)   furosemide (LASIX) injection 20 mg (20 mg IntraVENous Given 3/13/17 1929)       IMPRESSION:  1. COPD with hypoxia ([address]    2. Stenosis of both internal carotid arteries    3. Cerebral microvascular disease    4. Generalized weakness    5. Bilateral pleural effusion        PLAN: Admit pt to hospital.    Admit Note:  8:00 PM  Patient is being admitted to the hospital by Drake Vargas. The results of their tests and reasons for their admission have been discussed with the patient and/or available family. They convey their agreement and understanding for the need to be admitted and for their admission diagnosis.    Written by RAMÓN Verdugo, as dictated by Shan Hayes MD.    This note is prepared by Suleiman Parker, acting as Scribe for Alexandria Andrade MD.    Alexandria Andrade MD: The scribe's documentation has been prepared under my direction and personally reviewed by me in its entirety. I confirm that the note above accurately reflects all work, treatment, procedures, and medical decision making performed by me.

## 2017-03-13 NOTE — ED NOTES
Assumed care of pt from EMS stretcher. Pt comes from home with c/o increasing weakness x 3 weeks. Per EMS pt had recent hospitalization at [facility] approximately 3 weeks ago and had recent defibrillator replacement 1 week ago at [facility]. Also per EMS pt's family states pt has had recent hallucinations over the last 3 weeks. Pt currently A&Ox3. Pt placed on cardiac monitor x3. VSS. Pt in position of comfort with no signs of acute distress noted.

## 2017-03-13 NOTE — IP AVS SNAPSHOT
Current Discharge Medication List  
  
CONTINUE these medications which have CHANGED Dose & Instructions Dispensing Information Comments Morning Noon Evening Bedtime  
 quiNIDine gluconate  mg SR tablet Commonly known as:  DURA-TABS What changed:  Another medication with the same name was removed. Continue taking this medication, and follow the directions you see here. Your last dose was: Your next dose is:    
   
   
 Dose:  324 mg Take 324 mg by mouth daily. Refills:  0 CONTINUE these medications which have NOT CHANGED Dose & Instructions Dispensing Information Comments Morning Noon Evening Bedtime  
 amiodarone 200 mg tablet Commonly known as:  CORDARONE Your last dose was: Your next dose is:    
   
   
 Dose:  200 mg Take 200 mg by mouth daily. Refills:  0  
     
   
   
   
  
 ascorbic acid (vitamin C) 1,500 mg Tber Your last dose was: Your next dose is:    
   
   
 Dose:  1500 mg Take 1,500 mg by mouth two (2) times a day. Refills:  0  
     
   
   
   
  
 aspirin 81 mg chewable tablet Your last dose was: Your next dose is:    
   
   
 Dose:  81 mg Take 81 mg by mouth daily. Refills:  0  
     
   
   
   
  
 CALCIUM 600 + D 600-125 mg-unit Tab Generic drug:  calcium-cholecalciferol (d3) Your last dose was: Your next dose is:    
   
   
 Dose:  1 Tab Take 1 Tab by mouth daily. Refills:  0  
     
   
   
   
  
 glimepiride 4 mg tablet Commonly known as:  AMARYL Your last dose was: Your next dose is:    
   
   
 Dose:  4 mg Take 4 mg by mouth every morning. Refills:  0 GLUCOSAMINE COMPLEX PO Your last dose was: Your next dose is:    
   
   
 Dose:  1 Tab Take 1 Tab by mouth daily. Refills:  0 JANUVIA 100 mg tablet Generic drug:  SITagliptin Your last dose was: Your next dose is:    
   
   
 Dose:  100 mg Take 100 mg by mouth daily. Refills:  0  
     
   
   
   
  
 LASIX 20 mg tablet Generic drug:  furosemide Your last dose was: Your next dose is:    
   
   
 Dose:  20 mg Take 20 mg by mouth daily. Refills:  0  
     
   
   
   
  
 metFORMIN 1,000 mg tablet Commonly known as:  GLUCOPHAGE Your last dose was: Your next dose is:    
   
   
 Dose:  1000 mg Take 1,000 mg by mouth two (2) times daily (with meals). Refills:  0  
     
   
   
   
  
 metoprolol succinate 25 mg XL tablet Commonly known as:  TOPROL-XL Your last dose was: Your next dose is:    
   
   
 Dose:  12.5 mg Take 0.5 Tabs by mouth daily. Quantity:  30 Tab Refills:  0  
     
   
   
   
  
 mexiletine 150 mg capsule Commonly known as:  MEXITIL Your last dose was: Your next dose is:    
   
   
 Dose:  150 mg Take 150 mg by mouth two (2) times a day. Refills:  0  
     
   
   
   
  
 multivitamin tablet Commonly known as:  ONE A DAY Your last dose was: Your next dose is:    
   
   
 Dose:  1 Tab Take 1 Tab by mouth daily. Refills:  0  
     
   
   
   
  
 omega 3-dha-epa-fish oil 360-1,200 mg Cpdr  
   
Your last dose was: Your next dose is:    
   
   
 Dose:  2400 mg Take 2,400 mg by mouth three (3) times daily. Refills:  0  
     
   
   
   
  
 UBIQUINONE PO Your last dose was: Your next dose is:    
   
   
 Dose:  300 mg Take 300 mg by mouth daily. Refills:  0  
     
   
   
   
  
 vitamin E 400 unit capsule Commonly known as:  [address] Your last dose was: Your next dose is:    
   
   
 Dose:  400 Units Take 400 Units by mouth daily. Refills:  0 STOP taking these medications ALBUTEROL IN  
   
  
 lisinopril 2.5 mg tablet Commonly known as:  Xavire Naranjo  
 magnesium oxide 400 mg tablet Commonly known as:  MAG-OX  
   
  
 OMEGA 3 FISH OIL PO  
   
  
 POTASSIUM CHLORIDE potassium chloride SR 10 mEq tablet Commonly known as:  KLOR-CON 10 ASK your doctor about these medications Dose & Instructions Dispensing Information Comments Morning Noon Evening Bedtime  
 salmeterol 50 mcg/dose diskus inhaler Commonly known as:  SEREVENT Your last dose was: Your next dose is:    
   
   
 Dose:  1 Puff Take 1 Puff by inhalation two (2) times a day. Refills:  0 Where to Get Your Medications Information on where to get these meds will be given to you by the nurse or doctor. ! Ask your nurse or doctor about these medications  
  metoprolol succinate 25 mg XL tablet

## 2017-03-13 NOTE — IP AVS SNAPSHOT
[address] [facility] 
[phone] Patient: Naz Lim MRN: [id number] ODT:[id number] You are allergic to the following No active allergies Recent Documentation Height Weight BMI Smoking Status 1.88 m 71 kg 20.11 kg/m2 Former Smoker Emergency Contacts Name Discharge Info Relation Home Work Mobile [employer] Public [24] [phone] About your hospitalization You were admitted on:  March 13, 2017 You last received care in the:  [facility] 3 NEUROSCIENCE TELEMETRY You were discharged on:  March 17, 2017 Unit phone number:  [phone] Why you were hospitalized Your primary diagnosis was:  Not on File Your diagnoses also included:  Generalized Weakness, Cerebral Microvascular Disease, Stenosis Of Both Internal Carotid Arteries Providers Seen During Your Hospitalizations Provider Role Specialty Primary office phone Cristiana Gilmore MD Attending Provider Emergency Medicine [phone] Monty Duran MD Attending Provider Internal Medicine [phone] Your Primary Care Physician (PCP) Primary Care Physician Office Phone Office Fax Jose Carlos Bhatti [phone] Follow-up Information Follow up With Details Comments Contact Info [facility])  This will be your skilled nursing rehab facility. [place] [id number] [facility]ed with [facility] [address] 
[phone] Gina Alvarez MD On 3/20/2017 You have a cardiology appointment at 11:00 am on Monday March 20, 2017. [address] [place] [id number] 
[phone] Flor Prasad NP   [address] [facility] [address] 
[phone] Current Discharge Medication List  
  
CONTINUE these medications which have CHANGED Dose & Instructions Dispensing Information Comments Morning Noon Evening Bedtime  
 quiNIDine gluconate  mg SR tablet Commonly known as:  DURA-TABS What changed:  Another medication with the same name was removed. Continue taking this medication, and follow the directions you see here. Your last dose was: Your next dose is:    
   
   
 Dose:  324 mg Take 324 mg by mouth daily. Refills:  0 CONTINUE these medications which have NOT CHANGED Dose & Instructions Dispensing Information Comments Morning Noon Evening Bedtime  
 amiodarone 200 mg tablet Commonly known as:  CORDARONE Your last dose was: Your next dose is:    
   
   
 Dose:  200 mg Take 200 mg by mouth daily. Refills:  0  
     
   
   
   
  
 ascorbic acid (vitamin C) 1,500 mg Tber Your last dose was: Your next dose is:    
   
   
 Dose:  1500 mg Take 1,500 mg by mouth two (2) times a day. Refills:  0  
     
   
   
   
  
 aspirin 81 mg chewable tablet Your last dose was: Your next dose is:    
   
   
 Dose:  81 mg Take 81 mg by mouth daily. Refills:  0  
     
   
   
   
  
 CALCIUM 600 + D 600-125 mg-unit Tab Generic drug:  calcium-cholecalciferol (d3) Your last dose was: Your next dose is:    
   
   
 Dose:  1 Tab Take 1 Tab by mouth daily. Refills:  0  
     
   
   
   
  
 glimepiride 4 mg tablet Commonly known as:  AMARYL Your last dose was: Your next dose is:    
   
   
 Dose:  4 mg Take 4 mg by mouth every morning. Refills:  0 GLUCOSAMINE COMPLEX PO Your last dose was: Your next dose is:    
   
   
 Dose:  1 Tab Take 1 Tab by mouth daily. Refills:  0 JANUVIA 100 mg tablet Generic drug:  SITagliptin Your last dose was:     
   
Your next dose is:    
   
   
 Dose:  100 mg  
 Take 100 mg by mouth daily. Refills:  0  
     
   
   
   
  
 LASIX 20 mg tablet Generic drug:  furosemide Your last dose was: Your next dose is:    
   
   
 Dose:  20 mg Take 20 mg by mouth daily. Refills:  0  
     
   
   
   
  
 metFORMIN 1,000 mg tablet Commonly known as:  GLUCOPHAGE Your last dose was: Your next dose is:    
   
   
 Dose:  1000 mg Take 1,000 mg by mouth two (2) times daily (with meals). Refills:  0  
     
   
   
   
  
 metoprolol succinate 25 mg XL tablet Commonly known as:  TOPROL-XL Your last dose was: Your next dose is:    
   
   
 Dose:  12.5 mg Take 0.5 Tabs by mouth daily. Quantity:  30 Tab Refills:  0  
     
   
   
   
  
 mexiletine 150 mg capsule Commonly known as:  MEXITIL Your last dose was: Your next dose is:    
   
   
 Dose:  150 mg Take 150 mg by mouth two (2) times a day. Refills:  0  
     
   
   
   
  
 multivitamin tablet Commonly known as:  ONE A DAY Your last dose was: Your next dose is:    
   
   
 Dose:  1 Tab Take 1 Tab by mouth daily. Refills:  0  
     
   
   
   
  
 omega 3-dha-epa-fish oil 360-1,200 mg Cpdr  
   
Your last dose was: Your next dose is:    
   
   
 Dose:  2400 mg Take 2,400 mg by mouth three (3) times daily. Refills:  0  
     
   
   
   
  
 UBIQUINONE PO Your last dose was: Your next dose is:    
   
   
 Dose:  300 mg Take 300 mg by mouth daily. Refills:  0  
     
   
   
   
  
 vitamin E 400 unit capsule Commonly known as:  [address] Your last dose was: Your next dose is:    
   
   
 Dose:  400 Units Take 400 Units by mouth daily. Refills:  0 STOP taking these medications ALBUTEROL IN  
   
  
 lisinopril 2.5 mg tablet Commonly known as:  PRINIVIL, ZESTRIL  
   
  
 magnesium oxide 400 mg tablet Commonly known as:  MAG-OX OMEGA 3 FISH OIL PO  
   
  
 POTASSIUM CHLORIDE potassium chloride SR 10 mEq tablet Commonly known as:  KLOR-CON 10 ASK your doctor about these medications Dose & Instructions Dispensing Information Comments Morning Noon Evening Bedtime  
 salmeterol 50 mcg/dose diskus inhaler Commonly known as:  SEREVENT Your last dose was: Your next dose is:    
   
   
 Dose:  1 Puff Take 1 Puff by inhalation two (2) times a day. Refills:  0 Where to Get Your Medications Information on where to get these meds will be given to you by the nurse or doctor. ! Ask your nurse or doctor about these medications  
  metoprolol succinate 25 mg XL tablet Discharge Instructions None Discharge Instructions Attachments/References HEART FAILURE ZONES: GENERAL INFO (ENGLISH) HEART FAILURE: AVOIDING TRIGGERS (ENGLISH) HEART FAILURE: SELF-CARE: GENERAL INFO (ENGLISH) Discharge Orders None Bizily Announcement We are excited to announce that we are making your provider's discharge notes available to you in Bizily. You will see these notes when they are completed and signed by the physician that discharged you from your recent hospital stay. If you have any questions or concerns about any information you see in Bizily, please call the Health Information Department where you were seen or reach out to your Primary Care Provider for more information about your plan of care. Introducing [facility] SERVICES! Thiago Hauser introduces Bizily patient portal. Now you can access parts of your medical record, email your doctor's office, and request medication refills online. 1. In your internet browser, go to [email or web address] 2. Click on the First Time User? Click Here link in the Sign In box. You will see the New Member Sign Up page. 3. Enter your Shutter Guardian Access Code exactly as it appears below. You will not need to use this code after youve completed the sign-up process. If you do not sign up before the expiration date, you must request a new code. · Shutter Guardian Access Code: [email or web address] Expires: 6/11/2017  3:41 PM 
 
4. Enter the last four digits of your Social Security Number (xxxx) and Date of Birth (mm/dd/yyyy) as indicated and click Submit. You will be taken to the next sign-up page. 5. Create a Shutter Guardian ID. This will be your Shutter Guardian login ID and cannot be changed, so think of one that is secure and easy to remember. 6. Create a Shutter Guardian password. You can change your password at any time. 7. Enter your Password Reset Question and Answer. This can be used at a later time if you forget your password. 8. Enter your e-mail address. You will receive e-mail notification when new information is available in [address] 9. Click Sign Up. You can now view and download portions of your medical record. 10. Click the Download Summary menu link to download a portable copy of your medical information. If you have questions, please visit the Frequently Asked Questions section of the Shutter Guardian website. Remember, Shutter Guardian is NOT to be used for urgent needs. For medical emergencies, dial 911. Now available from your iPhone and Android! General Information Please provide this summary of care documentation to your next provider. Patient Signature:  ____________________________________________________________ Date:  ____________________________________________________________  
  
[facility] Signature:  ____________________________________________________________ Date:  ____________________________________________________________ More Information Learning About Heart Failure Zones What are heart failure zones? Heart failure zones give you an easy way to see changes in your heart failure symptoms. They also tell you when you need to get help. Check every day to see which zone you are in. Green zone. You are doing well. This is where you want to be. · Your weight is stable. This means it is not going up or down. · You breathe easily. · You are sleeping well. You are able to lie flat without shortness of breath. · You can do your usual activities. Yellow zone. Be careful. Your symptoms are changing. Call your doctor. · You have new or increased shortness of breath. · You are dizzy or lightheaded, or you feel like you may faint. · You have sudden weight gain, such as 3 pounds or more in 2 to 3 days. · You have increased swelling in your legs, ankles, or feet. · You are so tired or weak that you cannot do your usual activities. · You are not sleeping well. Shortness of breath wakes you up at night. You need extra pillows. Your doctor's name: ____________________________________________________________ Your doctor's contact information: _________________________________________________ Red zone. This is an emergency. Call 911. You have symptoms of sudden heart failure, such as: 
· You have severe trouble breathing. · You cough up pink, foamy mucus. · You have a new irregular or fast heartbeat. You have symptoms of a heart attack. These may include: · Chest pain or pressure, or a strange feeling in the chest. 
· Sweating. · Shortness of breath. · Nausea or vomiting. · Pain, pressure, or a strange feeling in the back, neck, jaw, or upper belly or in one or both shoulders or arms. · Lightheadedness or sudden weakness. · A fast or irregular heartbeat. If you have symptoms of a heart attack: After you call 911, the  may tell you to chew 1 adult-strength or 2 to 4 low-dose aspirin. Wait for an ambulance. Do not try to drive yourself. Follow-up care is a key part of your treatment and safety. Be sure to make and go to all appointments, and call your doctor if you are having problems. It's also a good idea to know your test results and keep a list of the medicines you take. Where can you learn more? Go to http://micaela-jozef.info/. Enter T174 in the search box to learn more about \"Learning About Heart Failure Zones. \" Current as of: January 27, 2016 Content Version: 11.1 © 7079-5177 [employer]. Care instructions adapted under license by [employer] (which disclaims liability or warranty for this information). If you have questions about a medical condition or this instruction, always ask your healthcare professional. [address] any warranty or liability for your use of this information. Avoiding Triggers With Heart Failure: Care Instructions Your Care Instructions Triggers are anything that make your heart failure flare up. A flare-up is also called \"sudden heart failure\" or \"acute heart failure. \" When you have a flare-up, fluid builds up in your lungs, and you have problems breathing. You might need to go to the hospital. By watching for changes in your condition and avoiding triggers, you can prevent heart failure flare-ups. Follow-up care is a key part of your treatment and safety. Be sure to make and go to all appointments, and call your doctor if you are having problems. It's also a good idea to know your test results and keep a list of the medicines you take. How can you care for yourself at home? Watch for changes in your weight and condition · Weigh yourself without clothing at the same time each day. Record your weight. Call your doctor if you gain 3 pounds or more in 2 to 3 days. A sudden weight gain may mean that your heart failure is getting worse. · Keep a daily record of your symptoms.  Write down any changes in how you feel, such as new shortness of breath, cough, or problems eating. Also record if your ankles are more swollen than usual and if you have to urinate in the night more often. Note anything that you ate or did that could have triggered these changes. Limit sodium Sodium causes your body to hold on to water, making it harder for your heart to pump. People get most of their sodium from processed foods. Fast food and restaurant meals also tend to be very high in sodium. · Your doctor may suggest that you limit sodium to 2,000 milligrams (mg) a day or less. That is less than 1 teaspoon of salt a day, including all the salt you eat in cooking or in packaged foods. · Read food labels on cans and food packages. They tell you how much sodium you get in one serving. Check the serving size. If you eat more than one serving, you are getting more sodium. · Be aware that sodium can come in forms other than salt, including monosodium glutamate (MSG), sodium citrate, and sodium bicarbonate (baking soda). MSG is often added to Asian food. You can sometimes ask for food without MSG or salt. · Slowly reducing salt will help you adjust to the taste. Take the salt shaker off the table. · Flavor your food with garlic, lemon juice, onion, vinegar, herbs, and spices instead of salt. Do not use soy sauce, steak sauce, onion salt, garlic salt, mustard, or ketchup on your food, unless it is labeled \"low-sodium\" or \"low-salt. \" 
· Make your own salad dressings, sauces, and ketchup without adding salt. · Use fresh or frozen ingredients, instead of canned ones, whenever you can. Choose low-sodium canned goods. · Eat less processed food and food from restaurants, including fast food. Exercise as directed Moderate, regular exercise is very good for your heart. It improves your blood flow and helps control your weight. But too much exercise can stress your heart and cause a heart failure flare-up. · Check with your doctor before you start an exercise program. 
· Walking is an easy way to get exercise. Start out slowly. Gradually increase the length and pace of your walk. Swimming, riding a bike, and using a treadmill are also good forms of exercise. · When you exercise, watch for signs that your heart is working too hard. You are pushing yourself too hard if you cannot talk while you are exercising. If you become short of breath or dizzy or have chest pain, stop, sit down, and rest. 
· Do not exercise when you do not feel well. Take medicines correctly · Take your medicines exactly as prescribed. Call your doctor if you think you are having a problem with your medicine. · Make a list of all the medicines you take. Include those prescribed to you by other doctors and any over-the-counter medicines, vitamins, or supplements you take. Take this list with you when you go to any doctor. · Take your medicines at the same time every day. It may help you to post a list of all the medicines you take every day and what time of day you take them. · Make taking your medicine as simple as you can. Plan times to take your medicines when you are doing other things, such as eating a meal or getting ready for bed. This will make it easier to remember to take your medicines. · Get organized. Use helpful tools, such as daily or weekly pill containers. When should you call for help? Call 911 if you have symptoms of sudden heart failure such as: 
· You have severe trouble breathing. · You cough up pink, foamy mucus. · You have a new irregular or rapid heartbeat. Call your doctor now or seek immediate medical care if: 
· You have new or increased shortness of breath. · You are dizzy or lightheaded, or you feel like you may faint. · You have sudden weight gain, such as 3 pounds or more in 2 to 3 days. · You have increased swelling in your legs, ankles, or feet. · You are suddenly so tired or weak that you cannot do your usual activities. Watch closely for changes in your health, and be sure to contact your doctor if you develop new symptoms. Where can you learn more? Go to http://micaela-jozef.info/. Enter X345 in the search box to learn more about \"Avoiding Triggers With Heart Failure: Care Instructions. \" Current as of: April 27, 2016 Content Version: 11.1 © 9354-2413 Pager. Care instructions adapted under license by [employer] (which disclaims liability or warranty for this information). If you have questions about a medical condition or this instruction, always ask your healthcare professional. [address] any warranty or liability for your use of this information. Learning About Self-Care for Heart Failure What is self-care for heart failure? Heart failure usually gets worse over time. But there are many things you can do to feel better, stay healthy longer, and avoid the hospital. 
Self-care means managing your health by doing certain things every day, like weighing yourself. It's about knowing which symptoms to watch for so you can avoid getting worse. When you practice good self-care, you know when it's time to call your doctor and when your heart failure has turned into an emergency. The lists below can help. Top 5 self-care tips for every day 1. Take your medicines as prescribed. This gives them the best chance of helping you. 2. Watch for signs that you're getting worse. Weighing yourself every day is one of the best ways to do this. Weight gain may be a sign that your body is holding on to too much fluid. Weigh yourself at the same time each day, using the same scale, on a hard, flat surface. The best time is in the morning after you go to the bathroom and before you eat or drink anything. 3. Find out what your triggers are, and learn to avoid them.  Triggers are things that make your heart failure worse, often suddenly. A trigger may be eating too much salt, missing a dose of your medicine, or exercising too hard. 4. Limit sodium. This helps keep fluid from building up and makes it easier for your heart to pump. Your doctor may suggest that you limit sodium to 2,000 milligrams (mg) a day or less. That's less than 1 teaspoon. You can stay under this amount by limiting the salt you eat at home and by watching for \"hidden\" sodium when you eat out or shop for food. 5. Try to exercise throughout the week. Exercise makes your heart stronger and can help you avoid symptoms. Walking is a great way to get exercise. If your doctor says it's safe, start out with some short walks, and then slowly build up to longer ones. When should you act? Try to become familiar with signs that mean your heart failure is getting worse. If you need help, talk with your doctor about making a personal plan. Here are some things to watch for as you practice your daily self-care. Call your doctor if: 
· You gain 3 pounds or more over 2 to 3 days. (Or your doctor may tell you how much weight to watch for.) · You have new or worse swelling in your feet, ankles, or legs. · Your breathing gets worse. Activities that did not make you short of breath before are hard for you now. · Your breathing when you lie down is worse than usual, or you wake up at night needing to catch your breath. Be sure to make and go to all of your follow-up appointments. And it's always a good idea to call your doctor anytime you have a sudden change in symptoms. When is it an emergency? Sometimes the symptoms get worse very quickly. This is called sudden heart failure. It causes fluid to build up in your lungs. Sudden heart failure is an emergency. If you have any of these symptoms, you need care right away. Call 911 if: 
· You have severe shortness of breath. · You have an irregular or fast heartbeat. · You cough up foamy, pink mucus. What else can you do to stay healthy? There are other things you can do to take care of your body and your heart. These things will help you feel better. And they will also reduce your risk of heart attack and stroke. · Try to stay at a healthy weight. Eat a healthy diet with lots of fresh fruit, vegetables, and whole grains. · If you smoke, quit. · Limit the amount of alcohol you drink. · Keep high blood pressure and diabetes under control. If you need help, talk with your doctor. If your doctor has not set you up with a cardiac rehabilitation (rehab) program, talk to him or her about whether that is right for you. Cardiac rehab includes exercise, help with diet and lifestyle changes, and emotional support. Also let your doctor know if: 
· You're having trouble sleeping. Sleep is important to your well-being. It also helps your heart work the way it's supposed to. Your doctor can help you decide if you need treatment for sleep problems. · You're feeling sad and hopeless much of the time, or you are worried and anxious. Heart failure can be hard on your emotions. Treatment with counseling and medicine can help. And when you feel better, you're more likely to take care of yourself. Follow-up care is a key part of your treatment and safety. Be sure to make and go to all appointments, and call your doctor if you are having problems. It's also a good idea to know your test results and keep a list of the medicines you take. Where can you learn more? Go to http://micaela-jozef.info/. Enter G152 in the search box to learn more about \"Learning About Self-Care for Heart Failure. \" Current as of: January 27, 2016 Content Version: 11.1 © 3461-8933 [employer]. Care instructions adapted under license by [employer] (which disclaims liability or warranty for this information).  If you have questions about a medical condition or this instruction, always ask your healthcare professional. [place] [address] any warranty or liability for your use of this information.

## 2017-03-13 NOTE — ED NOTES
Family at bedside and per family, pt hospitalized at [address] [place] [address] 3 weeks ago for ablation d/t v-tach.

## 2017-03-14 PROBLEM — I65.23 STENOSIS OF BOTH INTERNAL CAROTID ARTERIES: Status: ACTIVE | Noted: 2017-03-14

## 2017-03-14 PROBLEM — I67.89 CEREBRAL MICROVASCULAR DISEASE: Status: ACTIVE | Noted: 2017-03-14

## 2017-03-14 LAB
AMMONIA PLAS-SCNC: <10 UMOL/L
ANION GAP BLD CALC-SCNC: 8 MMOL/L (ref 5–15)
ATRIAL RATE: 67 BPM
BASOPHILS # BLD AUTO: 0 K/UL (ref 0–0.1)
BASOPHILS # BLD: 0 % (ref 0–1)
BUN SERPL-MCNC: 33 MG/DL (ref 6–20)
BUN/CREAT SERPL: 24 (ref 12–20)
CALCIUM SERPL-MCNC: 8.8 MG/DL (ref 8.5–10.1)
CALCULATED P AXIS, ECG09: -6 DEGREES
CALCULATED R AXIS, ECG10: 158 DEGREES
CALCULATED T AXIS, ECG11: -26 DEGREES
CHLORIDE SERPL-SCNC: 105 MMOL/L (ref 97–108)
CO2 SERPL-SCNC: 32 MMOL/L (ref 21–32)
CREAT SERPL-MCNC: 1.36 MG/DL (ref 0.7–1.3)
DIAGNOSIS, 93000: NORMAL
EOSINOPHIL # BLD: 0.1 K/UL (ref 0–0.4)
EOSINOPHIL NFR BLD: 1 % (ref 0–7)
ERYTHROCYTE [DISTWIDTH] IN BLOOD BY AUTOMATED COUNT: 16.2 % (ref 11.5–14.5)
FOLATE SERPL-MCNC: 30.4 NG/ML (ref 5–21)
GGT SERPL-CCNC: 32 U/L (ref 15–85)
GLUCOSE BLD STRIP.AUTO-MCNC: 127 MG/DL (ref 65–100)
GLUCOSE BLD STRIP.AUTO-MCNC: 128 MG/DL (ref 65–100)
GLUCOSE BLD STRIP.AUTO-MCNC: 172 MG/DL (ref 65–100)
GLUCOSE BLD STRIP.AUTO-MCNC: 189 MG/DL (ref 65–100)
GLUCOSE SERPL-MCNC: 149 MG/DL (ref 65–100)
HCT VFR BLD AUTO: 39.5 % (ref 36.6–50.3)
HCYS SERPL-SCNC: 15.9 UMOL/L (ref 3.7–13.9)
HGB BLD-MCNC: 12.2 G/DL (ref 12.1–17)
LYMPHOCYTES # BLD AUTO: 18 % (ref 12–49)
LYMPHOCYTES # BLD: 1.4 K/UL (ref 0.8–3.5)
MCH RBC QN AUTO: 31 PG (ref 26–34)
MCHC RBC AUTO-ENTMCNC: 30.9 G/DL (ref 30–36.5)
MCV RBC AUTO: 100.3 FL (ref 80–99)
MONOCYTES # BLD: 0.8 K/UL (ref 0–1)
MONOCYTES NFR BLD AUTO: 10 % (ref 5–13)
NEUTS SEG # BLD: 5.4 K/UL (ref 1.8–8)
NEUTS SEG NFR BLD AUTO: 71 % (ref 32–75)
PLATELET # BLD AUTO: 188 K/UL (ref 150–400)
POTASSIUM SERPL-SCNC: 4.3 MMOL/L (ref 3.5–5.1)
Q-T INTERVAL, ECG07: 532 MS
QRS DURATION, ECG06: 190 MS
QTC CALCULATION (BEZET), ECG08: 562 MS
RBC # BLD AUTO: 3.94 M/UL (ref 4.1–5.7)
SERVICE CMNT-IMP: ABNORMAL
SODIUM SERPL-SCNC: 145 MMOL/L (ref 136–145)
TROPONIN I SERPL-MCNC: 0.09 NG/ML
TSH SERPL DL<=0.05 MIU/L-ACNC: 5.06 UIU/ML (ref 0.36–3.74)
VENTRICULAR RATE, ECG03: 67 BPM
VIT B12 SERPL-MCNC: 1913 PG/ML (ref 211–911)
WBC # BLD AUTO: 7.7 K/UL (ref 4.1–11.1)

## 2017-03-14 PROCEDURE — 80048 BASIC METABOLIC PNL TOTAL CA: CPT | Performed by: INTERNAL MEDICINE

## 2017-03-14 PROCEDURE — 84443 ASSAY THYROID STIM HORMONE: CPT | Performed by: PSYCHIATRY & NEUROLOGY

## 2017-03-14 PROCEDURE — 94640 AIRWAY INHALATION TREATMENT: CPT

## 2017-03-14 PROCEDURE — G8979 MOBILITY GOAL STATUS: HCPCS

## 2017-03-14 PROCEDURE — 85025 COMPLETE CBC W/AUTO DIFF WBC: CPT | Performed by: INTERNAL MEDICINE

## 2017-03-14 PROCEDURE — 74011250636 HC RX REV CODE- 250/636: Performed by: INTERNAL MEDICINE

## 2017-03-14 PROCEDURE — 84425 ASSAY OF VITAMIN B-1: CPT | Performed by: PSYCHIATRY & NEUROLOGY

## 2017-03-14 PROCEDURE — 95816 EEG AWAKE AND DROWSY: CPT | Performed by: PSYCHIATRY & NEUROLOGY

## 2017-03-14 PROCEDURE — G8988 SELF CARE GOAL STATUS: HCPCS | Performed by: OCCUPATIONAL THERAPIST

## 2017-03-14 PROCEDURE — 82607 VITAMIN B-12: CPT | Performed by: PSYCHIATRY & NEUROLOGY

## 2017-03-14 PROCEDURE — 93880 EXTRACRANIAL BILAT STUDY: CPT

## 2017-03-14 PROCEDURE — 84484 ASSAY OF TROPONIN QUANT: CPT | Performed by: INTERNAL MEDICINE

## 2017-03-14 PROCEDURE — 97161 PT EVAL LOW COMPLEX 20 MIN: CPT

## 2017-03-14 PROCEDURE — 82962 GLUCOSE BLOOD TEST: CPT

## 2017-03-14 PROCEDURE — 65660000000 HC RM CCU STEPDOWN

## 2017-03-14 PROCEDURE — 74011250637 HC RX REV CODE- 250/637: Performed by: INTERNAL MEDICINE

## 2017-03-14 PROCEDURE — G8987 SELF CARE CURRENT STATUS: HCPCS | Performed by: OCCUPATIONAL THERAPIST

## 2017-03-14 PROCEDURE — 82977 ASSAY OF GGT: CPT | Performed by: PSYCHIATRY & NEUROLOGY

## 2017-03-14 PROCEDURE — 83090 ASSAY OF HOMOCYSTEINE: CPT | Performed by: PSYCHIATRY & NEUROLOGY

## 2017-03-14 PROCEDURE — 36415 COLL VENOUS BLD VENIPUNCTURE: CPT | Performed by: PSYCHIATRY & NEUROLOGY

## 2017-03-14 PROCEDURE — 94664 DEMO&/EVAL PT USE INHALER: CPT

## 2017-03-14 PROCEDURE — 74011000250 HC RX REV CODE- 250: Performed by: INTERNAL MEDICINE

## 2017-03-14 PROCEDURE — G8978 MOBILITY CURRENT STATUS: HCPCS

## 2017-03-14 PROCEDURE — 82140 ASSAY OF AMMONIA: CPT | Performed by: PSYCHIATRY & NEUROLOGY

## 2017-03-14 PROCEDURE — 97535 SELF CARE MNGMENT TRAINING: CPT | Performed by: OCCUPATIONAL THERAPIST

## 2017-03-14 PROCEDURE — 97165 OT EVAL LOW COMPLEX 30 MIN: CPT | Performed by: OCCUPATIONAL THERAPIST

## 2017-03-14 RX ORDER — METOPROLOL SUCCINATE 25 MG/1
12.5 TABLET, EXTENDED RELEASE ORAL
Status: DISCONTINUED | OUTPATIENT
Start: 2017-03-14 | End: 2017-03-17 | Stop reason: HOSPADM

## 2017-03-14 RX ORDER — METFORMIN HYDROCHLORIDE 1000 MG/1
1000 TABLET ORAL 2 TIMES DAILY WITH MEALS
COMMUNITY

## 2017-03-14 RX ORDER — METOPROLOL SUCCINATE 25 MG/1
12.5 TABLET, EXTENDED RELEASE ORAL DAILY
COMMUNITY
End: 2017-03-17

## 2017-03-14 RX ORDER — METOPROLOL SUCCINATE 25 MG/1
25 TABLET, EXTENDED RELEASE ORAL
Status: DISCONTINUED | OUTPATIENT
Start: 2017-03-14 | End: 2017-03-14

## 2017-03-14 RX ORDER — POTASSIUM CHLORIDE 750 MG/1
10 TABLET, FILM COATED, EXTENDED RELEASE ORAL DAILY
COMMUNITY
End: 2017-03-17

## 2017-03-14 RX ORDER — MENTHOL 3.2 MG
2400 LOZENGE MUCOUS MEMBRANE 3 TIMES DAILY
COMMUNITY

## 2017-03-14 RX ADMIN — ASPIRIN 81 MG CHEWABLE TABLET 81 MG: 81 TABLET CHEWABLE at 08:22

## 2017-03-14 RX ADMIN — MEXILETINE HYDROCHLORIDE 150 MG: 150 CAPSULE ORAL at 20:17

## 2017-03-14 RX ADMIN — SALMETEROL XINAFOATE 1 PUFF: 50 POWDER, METERED ORAL; RESPIRATORY (INHALATION) at 08:25

## 2017-03-14 RX ADMIN — MEXILETINE HYDROCHLORIDE 150 MG: 150 CAPSULE ORAL at 08:25

## 2017-03-14 RX ADMIN — IPRATROPIUM BROMIDE AND ALBUTEROL SULFATE 3 ML: .5; 3 SOLUTION RESPIRATORY (INHALATION) at 02:43

## 2017-03-14 RX ADMIN — INSULIN LISPRO 2 UNITS: 100 INJECTION, SOLUTION INTRAVENOUS; SUBCUTANEOUS at 12:09

## 2017-03-14 RX ADMIN — VITAMIN D, TAB 1000IU (100/BT) 2000 UNITS: 25 TAB at 08:23

## 2017-03-14 RX ADMIN — FUROSEMIDE 20 MG: 10 INJECTION, SOLUTION INTRAMUSCULAR; INTRAVENOUS at 08:23

## 2017-03-14 RX ADMIN — INSULIN LISPRO 2 UNITS: 100 INJECTION, SOLUTION INTRAVENOUS; SUBCUTANEOUS at 08:21

## 2017-03-14 RX ADMIN — METOPROLOL TARTRATE 12.5 MG: 25 TABLET ORAL at 08:22

## 2017-03-14 RX ADMIN — Medication 400 MG: at 08:21

## 2017-03-14 RX ADMIN — FUROSEMIDE 20 MG: 10 INJECTION, SOLUTION INTRAMUSCULAR; INTRAVENOUS at 23:38

## 2017-03-14 RX ADMIN — AMIODARONE HYDROCHLORIDE 200 MG: 200 TABLET ORAL at 08:21

## 2017-03-14 RX ADMIN — Medication 10 ML: at 23:38

## 2017-03-14 RX ADMIN — IPRATROPIUM BROMIDE AND ALBUTEROL SULFATE 3 ML: .5; 3 SOLUTION RESPIRATORY (INHALATION) at 15:02

## 2017-03-14 RX ADMIN — METOPROLOL SUCCINATE 12.5 MG: 25 TABLET, EXTENDED RELEASE ORAL at 23:35

## 2017-03-14 RX ADMIN — Medication 10 ML: at 04:35

## 2017-03-14 RX ADMIN — PANTOPRAZOLE SODIUM 40 MG: 40 TABLET, DELAYED RELEASE ORAL at 08:23

## 2017-03-14 RX ADMIN — LINAGLIPTIN 5 MG: 5 TABLET, FILM COATED ORAL at 08:23

## 2017-03-14 RX ADMIN — IPRATROPIUM BROMIDE AND ALBUTEROL SULFATE 3 ML: .5; 3 SOLUTION RESPIRATORY (INHALATION) at 20:10

## 2017-03-14 RX ADMIN — IPRATROPIUM BROMIDE AND ALBUTEROL SULFATE 3 ML: .5; 3 SOLUTION RESPIRATORY (INHALATION) at 08:50

## 2017-03-14 RX ADMIN — ENOXAPARIN SODIUM 40 MG: 40 INJECTION SUBCUTANEOUS at 23:36

## 2017-03-14 NOTE — PROGRESS NOTES
No surgery found *  Bedside and Verbal shift change report given to Marsha RN (oncoming nurse) by Annika RN (offgoing nurse). Report included the following information SBAR, Kardex, MAR, Recent Results and Cardiac Rhythm Paced.     Zone Phone: [phone]        Significant changes during shift: Patient states he wishes to be discharged home and is not interested in a rehab facility     Patient Information     Jose Friedman  71 y.o.  3/13/2017 3:05 PM by Julio C Franks MD. Jose Friedman was admitted from Home     Problem List          Patient Active Problem List     Diagnosis Date Noted    Generalized weakness 03/13/2017           Past Medical History:   Diagnosis Date    Asthma       COPD    Diabetes ([facility] [address] [facility]      Heart failure ([facility] [address] [facility]              Core Measures:        Post Op Surgical (If Applicable):            Activity Status:     OOB to Chair yes  Ambulated this shift No   Bed Rest no     Supplemental O2: (If Applicable)     NC No  NRB No  Venti-mask No  On 0 Liters/min        LINES AND DRAINS: piv 20g rac         DVT prophylaxis:     DVT prophylaxis Med- Yes, Lovenox  DVT prophylaxis SCD or LESLY- No      Wounds: (If Applicable)     Wounds- Not applicable     Location 0     Patient Safety:     Falls Score Total Score: 3  Safety Level_______  Bed Alarm On? Yes  Sitter?  No     Plan for upcoming shift: See consults below           Discharge Plan: No too see CM      Active Consults:

## 2017-03-14 NOTE — PROCEDURES
[facility]  *** FINAL REPORT ***    Name: Ivis Harden  MRN: [id number]    Inpatient  : 25 Sep 1945  HIS Order #: [id number]  [address] [facility] Visit #: [id number]  Date: 14 Mar 2017    TYPE OF TEST: Cerebrovascular Duplex    REASON FOR TEST  AMS    Right Carotid:-             Proximal               Mid                 Distal  cm/s  Systolic  Diastolic  Systolic  Diastolic  Systolic  Diastolic  CCA:     84.3                                      43.0  Bulb:  ECA:     45.0  ICA:     27.0                 42.0                 65.0  ICA/CCA:  0.6    ICA Stenosis: <50%    Right Vertebral:-  Finding: Antegrade  Sys:       53.0  Camila:    Right Subclavian: Normal    Left Carotid:-            Proximal                Mid                 Distal  cm/s  Systolic  Diastolic  Systolic  Diastolic  Systolic  Diastolic  CCA:     12.1                                      46.0  Bulb:  ECA:     57.0  ICA:     41.0                 69.0                 68.0  ICA/CCA:  0.9    ICA Stenosis: <50%    Left Vertebral:-  Finding: Antegrade  Sys:       31.0  Camila:    Left Subclavian: Normal    INTERPRETATION/FINDINGS  PROCEDURE:  Carotid Duplex Examination using B-mode, color and  spectral Doppler of the extracranial cerebrovascular arteries. 1. Bilateral <50% stenosis of the internal carotid arteries. 2. No significant stenosis in the external carotid arteries  bilaterally. 3. Antegrade flow in both vertebral arteries. 4. Normal flow in both subclavian arteries. Plaque Morphology:  1. Heterogeneous plaque in the bulb and right ICA. 2. Heterogeneous plaque in the bulb and left ICA. ADDITIONAL COMMENTS    I have personally reviewed the data relevant to the interpretation of  this  study. TECHNOLOGIST: Leonor Staples. MERARI Black, RDMS  Signed: 2017 02:32 PM    PHYSICIAN: Delia Gallegos MD  Signed: 2017 05:12 PM

## 2017-03-14 NOTE — ROUTINE PROCESS
TRANSFER - OUT REPORT:    Verbal report given to [facility], RN(name) on Satish Conception  being transferred to NSTU(unit) for routine progression of care       Report consisted of patients Situation, Background, Assessment and   Recommendations(SBAR). Information from the following report(s) SBAR, Kardex, ED Summary, [facility] and Recent Results was reviewed with the receiving nurse. Lines:   Peripheral IV 03/13/17 Right Antecubital (Active)   Site Assessment Clean, dry, & intact 3/13/2017  3:20 PM   Phlebitis Assessment 0 3/13/2017  3:20 PM   Infiltration Assessment 0 3/13/2017  3:20 PM   Dressing Status Clean, dry, & intact 3/13/2017  3:20 PM   Dressing Type Transparent 3/13/2017  3:20 PM   Hub Color/Line Status Green 3/13/2017  3:20 PM        Opportunity for questions and clarification was provided.       Patient transported with:   Monitor

## 2017-03-14 NOTE — CONSULTS
IP CONSULT TO NEUROLOGY  Consult performed by: Asad Souza  Consult ordered by: KATEY Salinas  Reason for consult: confusion            NEUROLOGY HISTORY AND PHYSICAL    Name Calvin Almanzar Age 66 y.o. MRN [id number]  1945     Consulting Physician: Juana Fu MD      Chief Complaint:  Debility and confusion     This is a 66 y.o. male with a medical history of congestive heart failure and diabetes amitted one week after discharge from [facility] for progressive weakness and confusion. The patient states that he refused rehabilitation at [facility] stating that he wanted to go home. He is also states that he has people help him clean the house and take care of affairs. He has trouble walking to the bathroom and is looking to get a bed side commode. He sees a small girl in his house on occasion. He is aware its a hallucination and does not interact with it. He also frequently finds himself have difficulty recognizing his own dwelling. He does not have a history of seizures or dementia. He has not suffered any recent head trauma. He has has not had recent changes in his medications. I have personally reviewed the chart   I have personally reviewed available imaging   I have the reviewed the available laboratory results. Assessment and Plan     1. Debility  Severe most likely secondary to his underlying medical issues  Would benefit from inpatient rehabilitation is not safe to go home. 2.Heart failure  BNP 9330    3. Diabetes  Continue tradjenta    4. Diabetic neuropathy  continue strict diabetes control. 5. Altered mental state  Lewy Body dementia vs delirium secondary to underlying disease     Patient Allergies    Review of patient's allergies indicates no known allergies.      Current Facility-Administered Medications   Medication Dose Route Frequency    metoprolol succinate (TOPROL-XL) XL tablet 25 mg  25 mg Oral QHS    sodium chloride (NS) flush 5-10 mL  5-10 mL IntraVENous Q8H    sodium chloride (NS) flush 5-10 mL  5-10 mL IntraVENous PRN    acetaminophen (TYLENOL) tablet 650 mg  650 mg Oral Q4H PRN    naloxone (NARCAN) injection 0.4 mg  0.4 mg IntraVENous PRN    ondansetron (ZOFRAN) injection 4 mg  4 mg IntraVENous Q4H PRN    bisacodyl (DULCOLAX) tablet 5 mg  5 mg Oral DAILY PRN    enoxaparin (LOVENOX) injection 40 mg  40 mg SubCUTAneous Q24H    aspirin chewable tablet 81 mg  81 mg Oral DAILY    amiodarone (CORDARONE) tablet 200 mg  200 mg Oral DAILY    cholecalciferol (VITAMIN D3) tablet 2,000 Units  2,000 Units Oral DAILY    pantoprazole (PROTONIX) tablet 40 mg  40 mg Oral ACB    HYDROcodone-acetaminophen (NORCO) 7.5-325 mg per tablet 1 Tab  1 Tab Oral Q6H PRN    magnesium oxide (MAG-OX) tablet 400 mg  400 mg Oral DAILY    mexiletine (MEXITIL) capsule 150 mg  150 mg Oral BID    salmeterol (SEREVENT) 50 mcg inhaler  1 Puff Inhalation DAILY    linagliptin (TRADJENTA) tablet 5 mg  5 mg Oral DAILY    insulin lispro (HUMALOG) injection   SubCUTAneous AC&HS    glucose chewable tablet 16 g  4 Tab Oral PRN    dextrose (D50W) injection syrg 12.5-25 g  12.5-25 g IntraVENous PRN    glucagon (GLUCAGEN) injection 1 mg  1 mg IntraMUSCular PRN    albuterol-ipratropium (DUO-NEB) 2.5 MG-0.5 MG/3 ML  3 mL Nebulization Q6H RT    furosemide (LASIX) injection 20 mg  20 mg IntraVENous Q12H       Past Medical History:   Diagnosis Date    Asthma     COPD    Diabetes ([facility]     Heart failure ([facility]        Social History   Substance Use Topics    Smoking status: Former Smoker    Smokeless tobacco: Not on file    Alcohol use No       Family History  Mother heart disease  No dementia      Review of Systems   Constitutional: Positive for malaise/fatigue. Negative for chills and fever. HENT: Negative for ear pain. Eyes: Negative for pain and discharge. Respiratory: Negative for cough and hemoptysis. Cardiovascular: Positive for palpitations and orthopnea.  Negative for chest pain and claudication. Gastrointestinal: Negative for constipation and diarrhea. Genitourinary: Negative for flank pain and hematuria. Musculoskeletal: Positive for back pain, joint pain and myalgias. Skin: Negative for itching and rash. Neurological: Positive for tingling and weakness. Negative for headaches. Endo/Heme/Allergies: Negative for environmental allergies. Does not bruise/bleed easily. Psychiatric/Behavioral: Positive for memory loss. Negative for depression and hallucinations. The patient has insomnia. Visit Vitals    BP 98/58 (BP 1 Location: Right arm, BP Patient Position: Sitting)    Pulse 68    Temp 97.6 °F (36.4 °C)    Resp 18    Ht 6' 2\" (1.88 m)    Wt 165 lb (74.8 kg)    SpO2 92%    BMI 21.18 kg/m2     Physical Exam   Constitutional: He is oriented to person, place, and time. HENT:   Head: Normocephalic and atraumatic. Eyes: Conjunctivae and EOM are normal. Pupils are equal, round, and reactive to light. Neck: Neck supple. No JVD present. Carotid bruit is not present. No thyromegaly present. Cardiovascular: Normal rate, regular rhythm and normal heart sounds. Pulmonary/Chest: Effort normal and breath sounds normal. No respiratory distress. He has no wheezes. He has no rales. Abdominal: Soft. Bowel sounds are normal. He exhibits no distension. There is no tenderness. Neurological: He is oriented to person, place, and time. He has normal strength. He has a normal Finger-Nose-Finger Test.   Skin: No rash noted. He is diaphoretic. No erythema. Psychiatric: His speech is normal.     Neurologic Exam     Mental Status   Oriented to person, place, and time. Speech: speech is normal   Level of consciousness: alert  Knowledge: good. Cranial Nerves     CN II   Visual fields full to confrontation. CN III, IV, VI   Pupils are equal, round, and reactive to light. Extraocular motions are normal.     CN V   Facial sensation intact.      CN VII   Facial expression full, symmetric. CN VIII   Hearing: intact    CN IX, X   Palate: symmetric  Right gag reflex: normal  Left gag reflex: normal    CN XI   Right trapezius strength: normal  Left trapezius strength: normal    CN XII   Tongue: not atrophic  Fasciculations: absent  Tongue deviation: none    Motor Exam   Muscle bulk: normal  Overall muscle tone: normal    Strength   Strength 5/5 throughout. Right deltoid: 5/5  Left deltoid: 5/5  Right biceps: 5/5  Left biceps: 5/5  Right triceps: 5/5  Left triceps: 5/5  Right quadriceps: 4/5  Left quadriceps: 4/5  Right hamstrin/5  Left hamstrin/5  Right anterior tibial: 4/5  Left anterior tibial: 4/5  Right peroneal: 4/5  Left peroneal: 4/5    Sensory Exam   Right arm pinprick: decreased from wrist  Left arm pinprick: decreased from wrist  Right leg pinprick: decreased from knee  Left leg pinprick: decreased from knee       Sensory loss in a length dependant fashion affecting all modalities     Gait, Coordination, and Reflexes     Gait  Gait: wide-based    Coordination   Finger to nose coordination: normal    Tremor   Resting tremor: absent  Intention tremor: absent       Reflexes 1+. Gait is wide based. Mr. Gunjan Chaparro is winded after about five steps. Imaging    MRI Results (most recent):  No results found for this or any previous visit. CT Results (most recent):    Results from Hospital Encounter encounter on 17   CT HEAD WO CONT   Narrative INDICATION: Fatigue, weakness, presentation to emergency department. COMPARISON: None    EXAM: Unenhanced axial CT imaging of the head is obtained with coronal and  sagittal reformations. CT dose reduction was achieved through use of a  standardized protocol tailored for this examination and automatic exposure  control for dose modulation. FINDINGS:    There is mild prominence of the ventricles and cortical sulci consistent with  atrophy.  Minimal-mild bilateral cerebral periventricular diminished attenuation  is shown consistent with chronic small vessel ischemic disease of the white  matter. There is no unenhanced CT imaging evidence for acute infarction, mass or  intracranial hemorrhage. No extra-axial collection is shown. No osseous  abnormality is demonstrated. The orbits, visualized paranasal sinuses and  mastoid air cells appear normal.         Impression IMPRESSION: Mild atrophy. No acute findings.             Lab Review  Lab Results   Component Value Date/Time    WBC 7.7 03/14/2017 04:07 AM    HCT 39.5 03/14/2017 04:07 AM    HGB 12.2 03/14/2017 04:07 AM    PLATELET 068 09/25/2011 04:07 AM     Lab Results   Component Value Date/Time    Sodium 145 03/14/2017 04:07 AM    Potassium 4.3 03/14/2017 04:07 AM    Chloride 105 03/14/2017 04:07 AM    CO2 32 03/14/2017 04:07 AM    Glucose 149 03/14/2017 04:07 AM    BUN 33 03/14/2017 04:07 AM    Creatinine 1.36 03/14/2017 04:07 AM    Calcium 8.8 03/14/2017 04:07 AM

## 2017-03-14 NOTE — PROGRESS NOTES
Hospitalist Progress Note    NAME: Chuck Tovar   :  1945   MRN:  [id number]       Interim Hospital Summary: 66 y.o. male whom presented on 3/13/2017 with      Assessment / Plan:  Acute systolic heart failure, EF 35-40%/cardiomyopathy s/p ablation/defib placement  Elevated Troponin  -pt follows with [facility]  -trop peaked  -cxr showed small b/l pleural effusions, nonspecific ill-defined bibasilar pulm densities, L>R. Pt not hypoxic, no fevers, cough. Will monitor clinically  -IV lasix, asa, quinidine, bb, mexiletine, amiodarone   -hold lisinopril due to ynes    Hyperkalemia  -K 5.7 on admission, resolved     Generalized weakness  Fall precautions  -per pt and family, this is sudden onset. [facility] with recs of intpt rehab, however he declined. This is likely due to decondition in the setting of worsening of chronic medical issues. -head CT neg  -up in chair/ PT/OT,  Unsafe to go home. Likely benefit from rehab, however peristently refused     Acute kidney injury/? CKD  -unclear baseline  -will hold nephrotoxic agents for now including amaryl and lisinopril     T2DM  -recent A1C 7.9 from paper chart review from pt  -holding amaryl due to ynes  -continue SSI     COPD  -stable. Not on O2  -duonebs, prn nebs     Visual Hallucinations  -4 months hx. No hx of psychiatric issues. Debilitating per family. ? dementia  -denies SI/HI,no hallucination seen admission  -psych consulted        Code Status: Full  Surrogate Decision Maker: brother [phone]     DVT Prophylaxis: lovenox  GI Prophylaxis: not indicated     Baseline: independent       Subjective:     Chief Complaint / Reason for Physician Visit  Seen at bedside. Tolerated OT this AM.  No hallucinations seen admisision. Discussed with RN events overnight.      Review of Systems:  Symptom Y/N Comments  Symptom Y/N Comments   Fever/Chills n   Chest Pain n    Poor Appetite n   Edema y    Cough n   Abdominal Pain n Sputum    Joint Pain     SOB/HARRIS n   Pruritis/Rash     Nausea/vomit    Tolerating PT/OT y    Diarrhea    Tolerating Diet     Constipation    Other       Could NOT obtain due to:      Objective:     VITALS:   Last 24hrs VS reviewed since prior progress note. Most recent are:  Patient Vitals for the past 24 hrs:   Temp Pulse Resp BP SpO2   03/14/17 1113 97.6 °F (36.4 °C) - 18 98/58 92 %   03/14/17 0850 - - - - 94 %   03/14/17 0815 98 °F (36.7 °C) 68 16 106/58 95 %   03/14/17 0416 97.8 °F (36.6 °C) 75 14 114/52 94 %   03/14/17 0241 - - - - 94 %   03/13/17 2315 - 68 19 122/58 97 %   03/13/17 2301 - 68 15 - 95 %   03/13/17 2300 - 69 23 121/65 95 %   03/13/17 2230 - 68 19 123/63 95 %   03/13/17 2215 - 70 22 (!) 140/97 95 %   03/13/17 2200 - 69 30 120/62 94 %   03/13/17 2145 - 73 24 135/69 96 %   03/13/17 2100 - 71 18 124/63 95 %   03/13/17 2045 - 69 20 124/50 98 %   03/13/17 2030 - 73 18 122/74 96 %   03/13/17 2000 - 70 19 115/63 94 %   03/13/17 1945 - 70 19 115/62 97 %   03/13/17 1930 - 72 12 116/63 98 %   03/13/17 1929 - 71 - 114/58 -   03/13/17 1845 - 69 19 113/57 98 %   03/13/17 1815 - 71 18 111/66 96 %   03/13/17 1800 - 69 16 118/64 96 %   03/13/17 1745 - 68 15 117/64 99 %   03/13/17 1715 - 64 19 110/62 99 %   03/13/17 1707 - 64 15 111/64 99 %   03/13/17 1615 - 66 20 110/59 96 %   03/13/17 1545 - 67 21 105/60 96 %   03/13/17 1540 - - - - (!) 88 %   03/13/17 1530 - 70 22 105/58 90 %   03/13/17 1510 97.6 °F (36.4 °C) 65 18 107/60 91 %       Intake/Output Summary (Last 24 hours) at 03/14/17 1221  Last data filed at 03/14/17 0920   Gross per 24 hour   Intake                0 ml   Output             1575 ml   Net            -1575 ml        PHYSICAL EXAM:  General: WD, WN. Alert, cooperative, no acute distress    EENT:  EOMI. Anicteric sclerae. MMM  Resp:  Crackles at [employer]. No wheezing.   CV:  Regular  rhythm,  No edema  GI:  Soft, Non distended, Non tender.  +Bowel sounds  Neurologic:  Alert and oriented X 3, normal speech,   Psych:   Good insight. Not anxious nor agitated  Skin:  No rashes. No jaundice    Reviewed most current lab test results and cultures  YES  Reviewed most current radiology test results   YES  Review and summation of old records today    NO  Reviewed patient's current orders and MAR    YES  PMH/SH reviewed - no change compared to H&P  ________________________________________________________________________  Care Plan discussed with:    Comments   Patient x    Family      RN x    Care Manager     Consultant                        Multidiciplinary team rounds were held today with , nursing, pharmacist and clinical coordinator. Patient's plan of care was discussed; medications were reviewed and discharge planning was addressed. ________________________________________________________________________  Total NON critical care TIME:  35   Minutes    Total CRITICAL CARE TIME Spent:   Minutes non procedure based      Comments   >50% of visit spent in counseling and coordination of care     ________________________________________________________________________  Jessenia Ramsey MD     Procedures: see electronic medical records for all procedures/Xrays and details which were not copied into this note but were reviewed prior to creation of Plan. LABS:  I reviewed today's most current labs and imaging studies.   Pertinent labs include:  Recent Labs      03/14/17   0407  03/13/17   1604   WBC  7.7  8.4   HGB  12.2  11.9*   HCT  39.5  39.1   PLT  188  200     Recent Labs      03/14/17   0407  03/13/17   1758  03/13/17   1604   NA  145   --   143   K  4.3   --   5.7*   CL  105   --   104   CO2  32   --   34*   GLU  149*   --   240*   BUN  33*   --   38*   CREA  1.36*   --   1.40*   CA  8.8   --   8.6   MG   --    --   2.1   ALB   --    --   2.9*   TBILI   --    --   0.4   SGOT   --    --   28   ALT   --    --   37   INR   --   1.2*   --        Signed: Jessenia Ramsey MD

## 2017-03-14 NOTE — CONSULTS
CARDIOLOGY CONSULT    Patient ID:  Patient: Mary Kay Arndt  MRN: [id number]  Age: 66 y.o.  : 1945    Date of  Admission: 3/13/2017  3:05 PM   PCP:  Ernestine Garcia NP   Usual cardiologist:  [facility]    Assessment: 1. Cardiomyopathy, unspecified, reported EF 35-40%. 2. Acute on chronic systolic congestive heart failure with elevated proBNP, bilateral pleural effusions and parenchymal abnormality possibly edema. 3. Suspect history of paroxysmal VT given med list of amiodarone, mexilitine. On quinidine, too? Sounds like he's had ablation in the past.  4. Mildly elevated troponin with flat trend. Not ACS/MI. 5. Acute kidney failure with hyperkalemia (improved). 6. COPD. 7. Diabetes mellitus type 2 without mention of complication. 8. Altered mental status--metabolic encephalopathy probably atop dementia. Noncontrast head CT without bleed, infarct, or mass. Atrophy noted. 9. Chronic ASA therapy. 10. BIV-ICD implanted at [facility] recently (still has steristrips). 11. Acute diarrheal illness. 12. Chronic weakness. Plan:     1. Agree with continuing amiodarone, mexilitine. 2. Certainly would keep quinidine for now. Combined with amio, mexilitine, quite an antiarrhythmic cocktail. 3. Continue ASA. 4. Continue beta-blocker, changed to metoprolol ER 25 daily in evening. 5. Continue ACEI, provided K stays corrected. 6. Agree with diuretic. 7. BIV-ICD will be interrogated while here. []       High complexity decision making was performed in this patient at high risk for decompensation with multiple organ involvement. Mary Kay Arndt is a 66 y.o. male with a history of the above, I was asked to see him due to heart failure. He has had diarrhea a couple days PTA, but chronic weakness. Walks with assistance. He currently denies chest pain, dizziness, SOB.   Can't give much history (due to altered mental status) and his cardiology care has been at [facility].    Per the ER:  Cecilio Krishna is a 66 y.o. male with PMHx significant for heart failure, DM, COPD, and asthma who presents via EMS to the ED c/o fatigue and weakness x ~3 weeks after being discharged from [facility]. Pt presents with additional sx's of diarrhea x 2 days and hallucinations x 4-5 months. EMS reports that pt currently has a defibrillator placed that is made by [employer]. Pt notes that he has not felt his defibrillator go off in the past 6-8 months. Pt reports that his last episode of diarrhea was yesterday, and states that he has not had a bowel movement today PTA. At baseline, pt reports that he has trouble walking, and is dependent on support for movement. Pt also reports that he saw \"a young girl who wouldn't leave his house\" this morning at home PTA. Pt reports that there was nobody else in the house during that time, and notes that he has seen this \"girl\" ~15 times in the last 4-5 months in different episodes of hallucinations. Pt also notes that he only sees the girl during the [place] morning\" time period of the day. Pt specifically denies cough, fever, chills, chest pain, dysuria, hematuria, frequency, headache, ABD pain, leg pain, and back pain. \"      Past Medical History:   Diagnosis Date    Asthma     COPD    [facility]     Heart failure ([facility] [facility]         Past Surgical History:   Procedure Laterality Date    HX PACEMAKER         Social History   Substance Use Topics    Smoking status: Former Smoker    Smokeless tobacco: Not on file    Alcohol use No        History reviewed. No pertinent family history.      No Known Allergies       Current Facility-Administered Medications   Medication Dose Route Frequency    sodium chloride (NS) flush 5-10 mL  5-10 mL IntraVENous Q8H    sodium chloride (NS) flush 5-10 mL  5-10 mL IntraVENous PRN    acetaminophen (TYLENOL) tablet 650 mg  650 mg Oral Q4H PRN    naloxone (NARCAN) injection 0.4 mg  0.4 mg IntraVENous PRN    ondansetron [facility]) injection 4 mg  4 mg IntraVENous Q4H PRN    bisacodyl (DULCOLAX) tablet 5 mg  5 mg Oral DAILY PRN    enoxaparin (LOVENOX) injection 40 mg  40 mg SubCUTAneous Q24H    aspirin chewable tablet 81 mg  81 mg Oral DAILY    amiodarone (CORDARONE) tablet 200 mg  200 mg Oral DAILY    cholecalciferol (VITAMIN D3) tablet 2,000 Units  2,000 Units Oral DAILY    pantoprazole (PROTONIX) tablet 40 mg  40 mg Oral ACB    HYDROcodone-acetaminophen (NORCO) 7.5-325 mg per tablet 1 Tab  1 Tab Oral Q6H PRN    magnesium oxide (MAG-OX) tablet 400 mg  400 mg Oral DAILY    metoprolol tartrate (LOPRESSOR) tablet 12.5 mg  12.5 mg Oral BID    mexiletine (MEXITIL) capsule 150 mg  150 mg Oral BID    quiNIDine gluconate SR (DURA-TABS) 324 mg tablet 324 mg  324 mg Oral DAILY    salmeterol (SEREVENT) 50 mcg inhaler  1 Puff Inhalation DAILY    linagliptin (TRADJENTA) tablet 5 mg  5 mg Oral DAILY    insulin lispro (HUMALOG) injection   SubCUTAneous AC&HS    glucose chewable tablet 16 g  4 Tab Oral PRN    dextrose (D50W) injection syrg 12.5-25 g  12.5-25 g IntraVENous PRN    glucagon (GLUCAGEN) injection 1 mg  1 mg IntraMUSCular PRN    albuterol-ipratropium (DUO-NEB) 2.5 MG-0.5 MG/3 ML  3 mL Nebulization Q6H RT    furosemide (LASIX) injection 20 mg  20 mg IntraVENous Q12H       Review of Symptoms:  See HPI as well. Cannot give reliable full ROS due to altered mental status.   General: negative for fever, chills, sweats, weakness, weight loss   Eyes: negative for blurred vision, eye pain, loss of vision, diplopia   Ear Nose and Throat: negative for rhinorrhea, pharyngitis, otalgia, tinnitus, speech or swallowing difficulties   Respiratory: negative for SOB, cough, sputum production, wheezing, HARRIS, pleuritic pain   Cardiology: negative for chest pain, palpitations, orthopnea, PND, edema, syncope   Gastrointestinal: negative for abdominal pain, N/V, dysphagia, change in bowel habits, bleeding   Genitourinary: negative for frequency, urgency, dysuria, hematuria, incontinence   Muskuloskeletal : negative for arthralgia, myalgia   Hematology: negative for easy bruising, bleeding, lymphadenopathy   Dermatological: negative for rash, ulceration, mole change, new lesion   Endocrine: negative for hot flashes or polydipsia   Neurological: negative for headache, dizziness, confusion, focal weakness, paresthesia, memory loss, gait disturbance   Psychological: negative for anxiety, depression, agitation       Objective:      Physical Exam:  Temp (24hrs), Av.8 °F (36.6 °C), Min:97.6 °F (36.4 °C), Max:98 °F (36.7 °C)    Patient Vitals for the past 8 hrs:   Pulse   17 0815 68   17 0416 75    Patient Vitals for the past 8 hrs:   Resp   17 0815 16   17 0416 14    Patient Vitals for the past 8 hrs:   BP   17 0815 106/58   17 0416 114/52        Intake/Output Summary (Last 24 hours) at 17 0915  Last data filed at 17 2223   Gross per 24 hour   Intake                0 ml   Output             1000 ml   Net            -1000 ml       Nondiaphoretic, not in acute distress. Supple, no palpable thyromegaly. No scleral icterus, mucous membranes moist, conjuctivae pink, no xanthelasma. L chest BIV-ICD site with steristrips, no hematoma. Unlabored, clear to auscultation bilaterally except coarse crackles at posterior base, overall symmetric air movement but decreased flow. Regular rate and rhythm, no murmur, pericardial rub, knock, or gallop. No peripheral edema. No carotid bruit. Palpable radial pulses bilaterally. Abdomen, soft, nontender, nondistended. No abdominal bruit or pulstatile masses. Extremities without cyanosis or clubbing. Muscle tone OK but bulk reduced. Skin warm and dry. No rashes or ulcers. Neuro grossly nonfocal.  No tremor. Awake but confused. CARDIOGRAPHICS and STUDIES, I reviewed:    Telemetry:  SR with sequential V pacing. ECG:  SR with sequential BIV pacing. Labs:  Recent Labs      03/14/17   0407  03/13/17   1758  03/13/17   1604   CPK   --    --   87   TROIQ  0.09*  0.08*  0.10*     No results found for: CHOL, CHOLX, CHLST, CHOLV, HDL, LDL, DLDL, LDLC, DLDLP, TGL, TGLX, TRIGL, TRIGP, CHHD, CHHDX  Recent Labs      03/13/17   1758   INR  1.2*   PTP  12.3*   APTT  27.3      Recent Labs      03/14/17   0407  03/13/17   1604   NA  145  143   K  4.3  5.7*   CL  105  104   CO2  32  34*   BUN  33*  38*   CREA  1.36*  1.40*   GLU  149*  240*   CA  8.8  8.6   ALB   --   2.9*   WBC  7.7  8.4   HGB  12.2  11.9*   HCT  39.5  39.1   PLT  188  200     Recent Labs      03/13/17   1604   SGOT  28   AP  78   TP  5.8*   ALB  2.9*   GLOB  2.9     No components found for: GLPOC  No results for input(s): PH, PCO2, PO2 in the last 72 hours.         Laura Aguirre MD  3/14/2017

## 2017-03-14 NOTE — INTERDISCIPLINARY ROUNDS
[facility] Telemetry Unit Interdisciplinary rounds were held today to discuss patient plan of care and outcomes. The interdisciplinary team collaborated to facilitate discharge planning needs. The following members were present; Nurse Manager, Kelly Nicholson 2082, Pharmacist, Primary Nurse, , Physical Therapy,   Physician, and Case Management.      PLAN:  Lives alone, will need placement cardio and neuro to see today

## 2017-03-14 NOTE — H&P
Hospitalist Admission Note    NAME: Candis Yates   :  1945   MRN:  [id number]     Date/Time:  3/13/2017 9:59 PM    Patient PCP: Angeline Dickens, YEYO  ________________________________________________________________________    My assessment of this patient's clinical condition and my plan of care is as follows. Assessment / Plan:  Hyperkalemia  -K 5.7  -EKG showed V paced rhythm  -s/p nebs and kayexalate  -hold po K  -repeat bmp    Generalized weakness  Fall precautions  -sudden onset. Baseline-able to ambulate per pt's brother. 2 days ago, pt became weak, unable to hold himself up. No other neurological symptoms  -head CT neg  -will consult neurology for further assistant    Acute kidney injury  -unclear baseline, pt reports no hx of ckd  -will hold nephrotoxic agents for now including amaryl and lisinopril    T2DM  -recent A1C 7.9 from paper chart review from pt  -holding amaryl due to ynes    COPD  -stable. Not on o2  -duonebs, prn nebs  -O2 suppl, wean as tolerated    Visual Hallucinations  -4 months hx. No hx of psychiatric issues. Debilitating per family  -will consult psych  -denies SI/HI    Acute systolic heart failure, EF 35-40%/cardiomyopathy s/p ablation/defib placement  Elevated Troponin  -pt follows with [facility]  -trop 0.10  -cxr showed small b/l pleural effusions, nonspecific ill-defined bibasilar pulm densities, L>R. Pt not hypoxic, no fevers, cough. Will monitor clinically  -IV lasix, asa, quinidine, bb, mexiletine, amiodarone   -hold lisinopril due to ynes    Code Status: Full  Surrogate Decision Maker: brother  [phone]    DVT Prophylaxis: lovenox  GI Prophylaxis: not indicated    Baseline: independent        Subjective:   CHIEF COMPLAINT: weakness, debilitating    HISTORY OF PRESENT ILLNESS:     Candis Yates is a 66 y.o.    male w pmhx significant for acute systolic hf, cardiomyopathy s/p ablation/defib placement, follows with [facility], hx of COPD present to ED c/o of sudden onset of generalized weakness and fatigue started 1-2 days ago. Patient was recently discharged from [address] [place] [address] exactly 2 weeks ago, was recommended rehab, however he declined and went home with [facility]. States he was able to ambulate well up until 2 days ago when he suddenly developed sudden onset of weakness in LE where he was unable to ambulate normally. Denies any other focal neurological abnormalities. Patient also complained of visual hallucinations started 4 months ago progressively worsened this AM. Per brother at bedside, pt decided to call EMS because he was seeing a \"young girl who wouldn't leave him alone\". Denies any auditory hallucination. Other complaints including worsening of pitting edema, however denies any sob, cough, fever, chills, cp, changes bowel/bladder habits. We were asked to admit for work up and evaluation of the above problems. Past Medical History:   Diagnosis Date    Asthma     COPD    Diabetes ([facility] [address] [facility]     Heart failure ([facility] [address] [facility]         Past Surgical History:   Procedure Laterality Date    HX PACEMAKER         Social History   Substance Use Topics    Smoking status: Former Smoker    Smokeless tobacco: Not on file    Alcohol use No      History reviewed. No pertinent family history. No Known Allergies     Prior to Admission medications    Medication Sig Start Date End Date Taking? Authorizing Provider   HYDROcodone-acetaminophen (NORCO) 7.5-325 mg per tablet Take 1 Tab by mouth every six (6) hours as needed for Pain. Yes Ashlee Houser MD   ALBUTEROL IN Take 2 Puffs by inhalation every four (4) hours as needed. Yes Ashlee Houser MD   amiodarone (CORDARONE) 200 mg tablet Take 200 mg by mouth. Yes Ashlee Houser MD   ascorbic acid, vitamin C, 1,500 mg TbER Take 1,500 mg by mouth two (2) times a day. Yes Ashlee Houser MD   aspirin 81 mg chewable tablet Take 81 mg by mouth daily.    Yes Ashlee Houser MD   calcium-cholecalciferol, d3, (CALCIUM 600 + D) 600-125 mg-unit tab Take  by mouth. Herberth Houser MD   cholecalciferol vitamin D3, (VITAMIN D3) 2,000 unit tab Take  by mouth. Herberth Houser MD   esomeprazole (NEXIUM) 40 mg capsule Take 40 mg by mouth daily. Herberth Houser MD   furosemide (LASIX) 20 mg tablet Take 20 mg by mouth daily. Herberth Houser MD   glimepiride (AMARYL) 4 mg tablet Take 4 mg by mouth every morning. Herberth Houser MD   lisinopril (PRINIVIL, ZESTRIL) 2.5 mg tablet Take 2.5 mg by mouth daily. Yes Phys Other, MD   magnesium oxide (MAG-OX) 400 mg tablet Take 400 mg by mouth daily. Yes Phys Other, MD   metFORMIN (GLUCOPHAGE) 500 mg tablet Take  by mouth two (2) times daily (with meals). Herberth Houser MD   METOPROLOL TARTRATE PO Take 12.5 mg by mouth daily. Herberth Houser MD   mexiletine (MEXITIL) 150 mg capsule Take 150 mg by mouth two (2) times a day. Herberth Houser MD   multivitamin (ONE A DAY) tablet Take 1 Tab by mouth daily. Yes Phys Other, MD   OMEGA-3 FATTY ACIDS/FISH OIL (OMEGA 3 FISH OIL PO) Take 1,200 mg by mouth daily. Yes Phys Other, MD   POTASSIUM CHLORIDE Take 20 mEq by mouth daily. Herberth Houser MD   quiNIDine gluconate SR (DURA-TABS) 324 mg SR tablet Take 324 mg by mouth daily. Yes Phys Other, MD   salmeterol (SEREVENT) 50 mcg/dose diskus inhaler Take 1 Puff by inhalation two (2) times a day. Herberth Houser MD   SITagliptin (JANUVIA) 100 mg tablet Take 100 mg by mouth daily. Yes Phys Other, MD   UBIQUINONE PO Take 200 mg by mouth two (2) times a day. Yes Phys Other, MD   vitamin E (AQUA GEMS) 400 unit capsule Take 400 Units by mouth daily. Herberth Houser MD       REVIEW OF SYSTEMS:     I am not able to complete the review of systems because:    The patient is intubated and sedated    The patient has altered mental status due to his acute medical problems    The patient has baseline aphasia from prior stroke(s)    The patient has baseline dementia and is not reliable historian The patient is in acute medical distress and unable to provide information           Total of 12 systems reviewed as follows:       POSITIVE= underlined text  Negative = text not underlined  General:  fever, chills, sweats, generalized weakness, weight loss/gain,      loss of appetite   Eyes:    blurred vision, eye pain, loss of vision, double vision  ENT:    rhinorrhea, pharyngitis   Respiratory:   cough, sputum production, SOB, HARRIS, wheezing, pleuritic pain   Cardiology:   chest pain, palpitations, orthopnea, PND, edema, syncope   Gastrointestinal:  abdominal pain , N/V, diarrhea, dysphagia, constipation, bleeding   Genitourinary:  frequency, urgency, dysuria, hematuria, incontinence   Muskuloskeletal :  arthralgia, myalgia, back pain  Hematology:  easy bruising, nose or gum bleeding, lymphadenopathy   Dermatological: rash, ulceration, pruritis, color change / jaundice  Endocrine:   hot flashes or polydipsia   Neurological:  headache, dizziness, confusion, focal weakness, paresthesia,     Speech difficulties, memory loss, gait difficulty  Psychological: Feelings of anxiety, depression, agitation    Objective:   VITALS:    Visit Vitals    /63    Pulse 71    Temp 97.6 °F (36.4 °C)    Resp 18    Ht 6' 2\" (1.88 m)    Wt 74.8 kg (165 lb)    SpO2 95%    BMI 21.18 kg/m2       PHYSICAL EXAM:    General:    Alert, cooperative, no distress, appears stated age. HEENT: Atraumatic, anicteric sclerae, pink conjunctivae     No oral ulcers, mucosa moist, throat clear, dentition fair  Neck:  Supple, symmetrical,  thyroid: non tender  Lungs:   Clear to auscultation bilaterally. No Wheezing or Rhonchi. No rales. Chest wall:  No tenderness  No Accessory muscle use. Heart:   Regular  rhythm,  No  murmur   B/l LE pitting edema  Abdomen:   Soft, non-tender. Not distended. Bowel sounds normal  Extremities: No cyanosis. No clubbing      Capillary refill normal,  Radial pulse 2+  Skin:     Not pale.   Not Jaundiced No rashes   Psych:  Good insight. Not depressed. Not anxious or agitated. Neurologic: EOMs intact. No facial asymmetry. No aphasia or slurred speech. Symmetrical strength, Sensation grossly intact. Alert and oriented X 4.     _______________________________________________________________________  Care Plan discussed with:    Comments   Patient x    Family  x    RN     Care Manager                    Consultant:      _______________________________________________________________________  Expected  Disposition:   Home with Family    HH/PT/OT/RN    SNF/LTC x   CANDE    ________________________________________________________________________  TOTAL TIME:  61 Minutes    Critical Care Provided     Minutes non procedure based      Comments    x Reviewed previous records   >50% of visit spent in counseling and coordination of care  Discussion with patient and/or family and questions answered       ________________________________________________________________________  Signed: Dedra Church MD    Procedures: see electronic medical records for all procedures/Xrays and details which were not copied into this note but were reviewed prior to creation of Plan.     LAB DATA REVIEWED:    Recent Results (from the past 24 hour(s))   EKG, 12 LEAD, INITIAL    Collection Time: 03/13/17  3:24 PM   Result Value Ref Range    Ventricular Rate 67 BPM    Atrial Rate 67 BPM    QRS Duration 190 ms    Q-T Interval 532 ms    QTC Calculation (Bezet) 562 ms    Calculated P Axis -6 degrees    Calculated R Axis 158 degrees    Calculated T Axis -26 degrees    Diagnosis       Ventricular-paced rhythm  Abnormal ECG  When compared with ECG of 31-AUG-2007 07:03,  Previous ECG has undetermined rhythm, needs review     CBC WITH AUTOMATED DIFF    Collection Time: 03/13/17  4:04 PM   Result Value Ref Range    WBC 8.4 4.1 - 11.1 K/uL    RBC 3.88 (L) 4.10 - 5.70 M/uL    HGB 11.9 (L) 12.1 - 17.0 g/dL    HCT 39.1 36.6 - 50.3 %    .8 (H) 80.0 - 99.0 FL MCH 30.7 26.0 - 34.0 PG    MCHC 30.4 30.0 - 36.5 g/dL    RDW 16.5 (H) 11.5 - 14.5 %    PLATELET 959 776 - 864 K/uL    NEUTROPHILS 81 (H) 32 - 75 %    LYMPHOCYTES 10 (L) 12 - 49 %    MONOCYTES 9 5 - 13 %    EOSINOPHILS 0 0 - 7 %    BASOPHILS 0 0 - 1 %    ABS. NEUTROPHILS 6.8 1.8 - 8.0 K/UL    ABS. LYMPHOCYTES 0.9 0.8 - 3.5 K/UL    ABS. MONOCYTES 0.8 0.0 - 1.0 K/UL    ABS. EOSINOPHILS 0.0 0.0 - 0.4 K/UL    ABS. BASOPHILS 0.0 0.0 - 0.1 K/UL   METABOLIC PANEL, COMPREHENSIVE    Collection Time: 03/13/17  4:04 PM   Result Value Ref Range    Sodium 143 136 - 145 mmol/L    Potassium 5.7 (H) 3.5 - 5.1 mmol/L    Chloride 104 97 - 108 mmol/L    CO2 34 (H) 21 - 32 mmol/L    Anion gap 5 5 - 15 mmol/L    Glucose 240 (H) 65 - 100 mg/dL    BUN 38 (H) 6 - 20 MG/DL    Creatinine 1.40 (H) 0.70 - 1.30 MG/DL    BUN/Creatinine ratio 27 (H) 12 - 20      GFR est AA >60 >60 ml/min/1.73m2    GFR est non-AA 50 (L) >60 ml/min/1.73m2    Calcium 8.6 8.5 - 10.1 MG/DL    Bilirubin, total 0.4 0.2 - 1.0 MG/DL    ALT (SGPT) 37 12 - 78 U/L    AST (SGOT) 28 15 - 37 U/L    Alk.  phosphatase 78 45 - 117 U/L    Protein, total 5.8 (L) 6.4 - 8.2 g/dL    Albumin 2.9 (L) 3.5 - 5.0 g/dL    Globulin 2.9 2.0 - 4.0 g/dL    A-G Ratio 1.0 (L) 1.1 - 2.2     CK    Collection Time: 03/13/17  4:04 PM   Result Value Ref Range    CK 87 39 - 308 U/L   TROPONIN I    Collection Time: 03/13/17  4:04 PM   Result Value Ref Range    Troponin-I, Qt. 0.10 (H) <0.05 ng/mL   PRO-BNP    Collection Time: 03/13/17  4:04 PM   Result Value Ref Range    NT pro-BNP 9930 (H) 0 - 125 PG/ML   MAGNESIUM    Collection Time: 03/13/17  4:04 PM   Result Value Ref Range    Magnesium 2.1 1.6 - 2.4 mg/dL   PROTHROMBIN TIME + INR    Collection Time: 03/13/17  5:58 PM   Result Value Ref Range    INR 1.2 (H) 0.9 - 1.1      Prothrombin time 12.3 (H) 9.0 - 11.1 sec   PTT    Collection Time: 03/13/17  5:58 PM   Result Value Ref Range    aPTT 27.3 22.1 - 32.5 sec    aPTT, therapeutic range     58.0 - 77.0 SECS   TROPONIN I    Collection Time: 03/13/17  5:58 PM   Result Value Ref Range    Troponin-I, Qt. 0.08 (H) <0.05 ng/mL   URINALYSIS W/ RFLX MICROSCOPIC    Collection Time: 03/13/17  7:05 PM   Result Value Ref Range    Color YELLOW/STRAW      Appearance CLEAR CLEAR      Specific gravity 1.018 1.003 - 1.030      pH (UA) 5.0 5.0 - 8.0      Protein TRACE (A) NEG mg/dL    Glucose NEGATIVE  NEG mg/dL    Ketone NEGATIVE  NEG mg/dL    Bilirubin NEGATIVE  NEG      Blood NEGATIVE  NEG      Urobilinogen 0.2 0.2 - 1.0 EU/dL    Nitrites NEGATIVE  NEG      Leukocyte Esterase NEGATIVE  NEG      WBC 0-4 0 - 4 /hpf    RBC 0-5 0 - 5 /hpf    Epithelial cells FEW FEW /lpf    Bacteria 1+ (A) NEG /hpf    Hyaline cast 2-5 0 - 5 /lpf    Granular cast 0-2 (A) NEG /lpf

## 2017-03-14 NOTE — PROGRESS NOTES
Pharmacy Medication Reconciliation     The patient and patient's primary pharmacy were interviewed regarding current PTA medication list, use and drug allergies. The patient was questioned regarding use of any other inhalers, topical products, over the counter medications, herbal medications, vitamin products or ophthalmic/nasal/otic medication use. Recommendations/Findings: The following amendments were made to the patient's active medication list on file at [address]:     1) Additions:  - Metoprolol succinate ER 12.5 mg (1 PO daily)  - Potassium chloride ER 10 mEq (1 PO daily)  - Metformin 1000 mg (1 po BID)   - Glucosamine PO (1 PO daily) -- patient uncertain of strength    2) Deletions:   - Metoprolol Tartrate PO (12.5 mg daily) -- Possible error in selection of agent upon entry. - Potassium chloride 20 mEq (1 PO daily) -- wrong strength; actually taking 10 mEq daily.  - Metformin 500 mg (1 PO BID) -- Possible error upon entering; strength confirmed per [employer] - Albuterol HFA -- Patient reports never filling the prescription because it costs too much.   - Salmeterol [Serevent] diskus 50 mcg/dose (1 puff BID) -- Patient reports never filling the prescription because it costs too much.   - Hydrocodone-APAP 7.5/325  -- Patient reports never filling the prescription; confirmed per [employer]. - Magnesium oxide 400 mg (Take 2 tabs once daily)  Patient reports never filling the prescription; confirmed per [employer]. - Esomeprazole DR 40 mg (1 PO daily) -- Patient denies use of this medication PTA.  - Cholecalciferol 2000 unit (1 PO) -- Patient denies use of this medication PTA. 3) Changes:   - Updated instructions on quinidine  mg from \"324 mg daily\" to \"Quinidine gluconate SR [Dura-tabs] 324 mg, Take 2 tabs PO BID\" to reflect patient's PTA regimen; this regimen was confirmed with [employer]   - Updated omega-3 fatty acids 1200 mg daily to reflect patient's PTA regimen:  Fish Oil 1200 mg [360 mg total Omega-3], Takes 2 caps PO TID ([employer] brand). - Updated listed regimen for Ubiquinone 200 mg BID to \"Ubiquinone (CoQ10) 300 mg once daily\" to reflect patient's PTA regimen. 4) Medication non-compliance. This patient admits to not filling several prescriptions written for him due to expected cost of drug or other factors. This patient is very knowledgeable about his Medicare Part-D plan and eagerly verbalized understanding of reaching the donut-hole as well as the catastrophic phase as it relates to co-payment. -  Albuterol HFA, Serevent diskus: Although patient claims he received samples of these medications at [address] within the last six months, patient denies use of these samples due to anticipated cost of filling the prescription in the future. When asked to recall the Medicare Part-D copays for these medications, patient responded by stating the Serevent diskus would cost him $400 while in the [facility] and stated he [didn't] want any part of that.    - Magnesium oxide: Patient claims he never filled this prescription because he was afraid it would reduce his electrolytes and therefore place him at greater risk for shock.  Patient was educated on use of magnesium supplementation in the presence of low magnesium levels in the body to actually help prevent loss of potassium. Ironically, this patient was admitted to [facility] on 3/13 for hyperkalemia. - Hydrocodone 7.5/325: Patient denies filling this medication, stating the hardcopy is still at home.  Patient reports receiving the written rx following a recent procedure, but he was unsure how long ago this took place. Clarified PTA med list with [facility] [phone]. PTA medication list was corrected to the following:   Prior to Admission Medications   Prescriptions Last Dose Informant Patient Reported? Taking?    GLUCOSAM/GLUC PEREZ/VQ-JML-D-GLUC (GLUCOSAMINE COMPLEX PO) 3/13/2017 Self Yes Yes   Sig: Take 1 Tab by mouth daily. SITagliptin (JANUVIA) 100 mg tablet 3/13/2017 at Unknown time  Yes Yes   Sig: Take 100 mg by mouth daily. UBIQUINONE PO 3/13/2017  Yes Yes   Sig: Take 300 mg by mouth daily. amiodarone (CORDARONE) 200 mg tablet 3/13/2017 at Unknown time Self Yes Yes   Sig: Take 200 mg by mouth daily. ascorbic acid, vitamin C, 1,500 mg TbER 3/13/2017  Yes Yes   Sig: Take 1,500 mg by mouth two (2) times a day. aspirin 81 mg chewable tablet 3/13/2017  Yes Yes   Sig: Take 81 mg by mouth daily. calcium-cholecalciferol, d3, (CALCIUM 600 + D) 600-125 mg-unit tab 3/13/2017 at Unknown time  Yes Yes   Sig: Take 1 Tab by mouth daily. furosemide (LASIX) 20 mg tablet unknown  Yes Yes   Sig: Take 20 mg by mouth daily. glimepiride (AMARYL) 4 mg tablet 3/13/2017 at Unknown time  Yes Yes   Sig: Take 4 mg by mouth every morning. lisinopril (PRINIVIL, ZESTRIL) 2.5 mg tablet 3/13/2017 at Unknown time  Yes Yes   Sig: Take 2.5 mg by mouth daily. metFORMIN (GLUCOPHAGE) 1,000 mg tablet 3/8/2017  Yes Yes   Sig: Take 1,000 mg by mouth two (2) times daily (with meals). metoprolol succinate (TOPROL-XL) 25 mg XL tablet 3/13/2017  Yes Yes   Sig: Take 12.5 mg by mouth daily. mexiletine (MEXITIL) 150 mg capsule 3/13/2017 at Unknown time  Yes Yes   Sig: Take 150 mg by mouth two (2) times a day. multivitamin (ONE A DAY) tablet 3/13/2017 at Unknown time  Yes Yes   Sig: Take 1 Tab by mouth daily. omega 3-dha-epa-fish oil 360-1,200 mg cpDR 3/13/2017 Self Yes Yes   Sig: Take 2,400 mg by mouth three (3) times daily. potassium chloride SR (KLOR-CON 10) 10 mEq tablet unknown  Yes Yes   Sig: Take 10 mEq by mouth daily. quiNIDine gluconate SR (DURA-TABS) 324 mg SR tablet 3/13/2017 at Unknown time  Yes Yes   Sig: Take 648 mg by mouth two (2) times a day. vitamin E (AQUA GEMS) 400 unit capsule 3/13/2017 at Unknown time  Yes Yes   Sig: Take 400 Units by mouth daily.       Facility-Administered Medications: None     Thank you,  Joellen Deal  PharmD Candidate 2017

## 2017-03-14 NOTE — PROGRESS NOTES
Occupational Therapy Goals  Initiated 3/14/2017  1. Patient will perform grooming standing at sink with independence within 7 day(s). 2.  Patient will perform upper body dressing with modified independence within 7 day(s). 3.  Patient will perform lower body dressing with modified independence within 7 day(s). 4.  Patient will perform toilet transfers with modified independence within 7 day(s). 5.  Patient will perform all aspects of toileting with independence within 7 day(s). 6.  Patient will perform ADL setup with modified independence within 7 day(s). Occupational Therapy EVALUATION  Patient: Selma Valerio (7 y.o. male)  Date: 3/14/2017  Primary Diagnosis: Generalized weakness        Precautions: falls       ASSESSMENT :  Based on the objective data described below, the patient presents with GW, decreased activity tolerance, decreased safety awareness and decreased balance which is impairing his functional independence. He is below his independent baseline, now performing ADLs at a CGA level, and is supervision to [place] [id number] for functional mobility. Patient will benefit from skilled intervention to address the above impairments.   Patients rehabilitation potential is considered to be Good  Factors which may influence rehabilitation potential include:   []             None noted  []             Mental ability/status  []             Medical condition  [x]             Home/family situation and support systems  [x]             Safety awareness  []             Pain tolerance/management  []             Other:      PLAN :  Recommendations and Planned Interventions:  [x]               Self Care Training                  []        Therapeutic Activities  [x]               Functional Mobility Training    []        Cognitive Retraining  [x]               Therapeutic Exercises           [x]        Endurance Activities  [x]               Balance Training                   []        Neuromuscular Re-Education  []               Visual/Perceptual Training     [x]   Home Safety Training  [x]               Patient Education                 [x]        Family Training/Education  []               Other (comment):    Frequency/Duration: Patient will be followed by occupational therapy 3 times a week to address goals. Discharge Recommendations: Home Health OT and PT, continue paid caregiver 9-3 and recommend supervision after paid caregiver leaves. Further Equipment Recommendations for Discharge: none       SUBJECTIVE:   Patient stated You usually take care of myself, but I don't do the cooking and cleaning.     OBJECTIVE DATA SUMMARY:     Past Medical History:   Diagnosis Date    Asthma     COPD    Diabetes ([facility] [address] [facility]     Heart failure ([facility] [address]      Past Surgical History:   Procedure Laterality Date    HX PACEMAKER       Prior Level of Function/Home Situation: Independent to ambulate without an AD, independent to mod I for ADLs, and has a paid caregiver 9-3 to perform all cooking and cleaning. Patient stated that he would like to find an additional caregiver to come in after the one he presently has leaves for the day. Expanded or extensive additional review of patient history:     Home Situation  Home Environment: Private residence  # Steps to Enter: 5  Rails to Enter: Yes  Hand Rails : Bilateral  One/Two Story Residence: One story  Living Alone: Yes  Support Systems: Other (comments) (Paid care giver 9-3, who cooks and cleans)  Patient Expects to be Discharged to[de-identified] Private residence  Current DME Used/Available at Home: Grab bars, Shower chair, Walker, rolling  Tub or Shower Type: Tub/Shower combination  [x]  Right hand dominant   []  Left hand dominant  Cognitive/Behavioral Status:  Neurologic State: Alert  Orientation Level: Oriented X4  Cognition: Follows commands; Impaired decision making;Poor safety awareness        Safety/Judgement: Decreased awareness of need for assistance;Decreased awareness of need for safety;Decreased insight into deficits    Vision/Perceptual:         Acuity: Within Defined Limits    Corrective Lenses: Glasses  Range of Motion:  AROM: Generally decreased, functional     Strength:  Strength: Generally decreased, functional  Coordination:  Coordination: Generally decreased, functional          Tone & Sensation:  Tone: Normal  Balance:  Sitting: Intact  Standing: Impaired  Standing - Static: Good  Standing - Dynamic : Fair  Functional Mobility and Transfers for ADLs:  Bed Mobility:  Rolling: Supervision  Supine to Sit: Supervision     Scooting: Supervision  Transfers:  Sit to Stand: Contact guard assistance     Bed to Chair: Contact guard assistance (ambulating with a RW)          Toilet Transfer : Contact guard assistance              ADL Assessment:  Feeding: Independent    Oral Facial Hygiene/Grooming: Contact guard assistance    Bathing: Contact guard assistance    Upper Body Dressing: Contact guard assistance    Lower Body Dressing: Contact guard assistance    Toileting: Contact guard assistance                Functional Measure:  Barthel Index:    Bathin  Bladder: 5  Bowels: 10  Groomin  Dressin  Feeding: 10  Mobility: 0  Stairs: 0  Toilet Use: 5  Transfer (Bed to Chair and Back): 10  Total: 45       Barthel and G-code impairment scale:  Percentage of impairment CH  0% CI  1-19% CJ  20-39% CK  40-59% CL  60-79% CM  80-99% CN  100%   Barthel Score 0-100 100 99-80 79-60 59-40 20-39 1-19   0   Barthel Score 0-20 20 17-19 13-16 9-12 5-8 1-4 0      The Barthel ADL Index: Guidelines  1. The index should be used as a record of what a patient does, not as a record of what a patient could do. 2. The main aim is to establish degree of independence from any help, physical or verbal, however minor and for whatever reason. 3. The need for supervision renders the patient not independent. 4. A patient's performance should be established using the best available evidence.  Asking the patient, friends/relatives and nurses are the usual sources, but direct observation and common sense are also important. However direct testing is not needed. 5. Usually the patient's performance over the preceding 24-48 hours is important, but occasionally longer periods will be relevant. 6. Middle categories imply that the patient supplies over 50 per cent of the effort. 7. Use of aids to be independent is allowed. Hope Hancock., Barthel, D.W. (0664). Functional evaluation: the Barthel Index. [address] [facility] (14)2. BOLIVAR MatthewF, Jones Art., [place]., [place], [address] (1999). Measuring the change indisability after inpatient rehabilitation; comparison of the responsiveness of the Barthel Index and Functional [place] Measure. Journal of Neurology, Neurosurgery, and Psychiatry, 66(4), 277-010. JAMEE Hernandez, DANIKA Chen, & Ivis Jasso, M.A. (2004.) Assessment of post-stroke quality of life in cost-effectiveness studies: The usefulness of the Barthel Index and the EuroQoL-5D. Quality of Life Research, 13, 427-43       In compliance with CMSs Claims Based Outcome Reporting, the following G-code set was chosen for this patient based on their primary functional limitation being treated: The outcome measure chosen to determine the severity of the functional limitation was the Barthel Index with a score of 45/100 which was correlated with the impairment scale. ?  Self Care:     - CURRENT STATUS: CK - 40%-59% impaired, limited or restricted    - GOAL STATUS:  CJ - 20%-39% impaired, limited or restricted    - D/C STATUS:  ---------------To be determined---------------       Occupational Therapy Evaluation Charge Determination   History Examination Decision-Making   LOW Complexity : Brief history review  LOW Complexity : 1-3 performance deficits relating to physical, cognitive , or psychosocial skils that result in activity limitations and / or participation restrictions  LOW Complexity : No comorbidities that affect functional and no verbal or physical assistance needed to complete eval tasks       Based on the above components, the patient evaluation is determined to be of the following complexity level: LOW       ADL Intervention and task modifications:  Initiated dressing ADL, transfer, toileting and safety training. Pain:  Pain Scale 1: Numeric (0 - 10)  Pain Intensity 1: 0     Activity Tolerance:   Fair overall. No complaint of dizziness or feeling light headed. No SOB. After treatment:   [x] Patient left in no apparent distress sitting up in chair  [] Patient left in no apparent distress in bed  [x] Call bell left within reach  [x] Nursing notified  [] Caregiver present  [x] Bed alarm activated    COMMUNICATION/EDUCATION:   The patients plan of care was discussed with: Physical Therapist and Registered Nurse. [x] Home safety education was provided and the patient/caregiver indicated understanding. [x] Patient/family have participated as able in goal setting and plan of care. [x] Patient/family agree to work toward stated goals and plan of care. [] Patient understands intent and goals of therapy, but is neutral about his/her participation. [] Patient is unable to participate in goal setting and plan of care. This patients plan of care is appropriate for delegation to [facility].     Thank you for this referral.  Roberto Coleman, OTR/L  Time Calculation: 30 mins

## 2017-03-14 NOTE — PROGRESS NOTES
[facility]  Preliminary Report:  Carotid Duplex Scan    Right:  Minimal plaque noted in the right carotid system. Right ICA velocities suggest less than 50% diameter reduction. Right vertebral artery flow is antegrade. Left:  Minimal plaque noted in the left carotid system. Left ICA velocities suggest less than 50% diameter reduction. Left vertebral artery flow is antegrade. Final report to follow.

## 2017-03-14 NOTE — PROGRESS NOTES
Problem: Mobility Impaired (Adult and Pediatric)  Goal: *Acute Goals and Plan of Care (Insert Text)  Physical Therapy Goals  Initiated 3/14/2017  1. Patient will move from supine to sit and sit to supine , scoot up and down and roll side to side in bed with supervision/set-up within 7 day(s). 2. Patient will transfer from bed to chair and chair to bed with supervision/set-up using the least restrictive device within 7 day(s). 3. Patient will perform sit to stand with supervision/set-up within 7 day(s). 4. Patient will ambulate with supervision/set-up for 75 feet with the least restrictive device within 7 day(s). PHYSICAL THERAPY EVALUATION  Patient: Linda Cat (79 y.o. male)  Date: 3/14/2017  Primary Diagnosis: Generalized weakness        Precautions: Fall         ASSESSMENT :  Based on the objective data described below, the patient presents with impaired functional mobility secondary to generalized weakness, dynamic balance deficits, decreased safety awareness, hypotension, and decreased activity tolerance. Pt received seated in bedside chair, agreeable to participation with therapy. Upon assessment of vital signs, BP found to be decreased to 75/46 at seated rest. Readjusted BP cuff and reassessed with BP remaining decreased to 75/36. RN notified and requested pt return to supine position in bed. Pt required modAx1 during sit>>stand transfer from bedside chair as well as CG-Nestor during ambulation w/ use of RW. Pt with unsteady gait overall, exhibiting minimal step length and step height bilaterally (shuffled pattern) as well was increased trunk flexion. Pt impulsively descended into seated position EOB without being safely infront of EOB surface. Once supine in bed, BP 88/40. Will plan to further assess gait stability and safety over longer distance during next therapy session as BP allows however pt is a falls risk at this time and should continue to have 1 person assist w/ use of RW.  Pending further progress with therapy and pt's ability to acquire increased assistance at home, recommend [facility] PT w/ increased caregiver support vs. SNF. Prior to admission, pt reports living alone however has caregiver from 9-3/day to assist with cleaning and cooking. States he is attempting to acquire increased caregiver support. Pt reports \"sometimes\" using RW and \"sometimes\" using SPC. History of multiple falls over previous 6 months. Patient will benefit from skilled intervention to address the above impairments. Patients rehabilitation potential is considered to be Good  Factors which may influence rehabilitation potential include:   [ ]         None noted  [X]         Mental ability/status  [ ]         Medical condition  [ ]         Home/family situation and support systems  [ ]         Safety awareness  [ ]         Pain tolerance/management  [ ]         Other:        PLAN :  Recommendations and Planned Interventions:  [ ]           Bed Mobility Training             [ ]    Neuromuscular Re-Education  [ ]           Transfer Training                   [ ]    Orthotic/Prosthetic Training  [ ]           Gait Training                         [ ]    Modalities  [ ]           Therapeutic Exercises           [ ]    Edema Management/Control  [ ]           Therapeutic Activities            [ ]    Patient and Family Training/Education  [ ]           Other (comment):     Frequency/Duration: Patient will be followed by physical therapy  4 times a week to address goals. Discharge Recommendations:  PT w/ increase caregiver support vs SNF  Further Equipment Recommendations for Discharge: Owns SPC and RW       SUBJECTIVE:   Patient stated Sometimes. As I feel like it.       OBJECTIVE DATA SUMMARY:   HISTORY:    Past Medical History:   Diagnosis Date    Asthma       COPD    Diabetes ([facility] [address]      Heart failure ([facility] [address] [facility]       Past Surgical History:   Procedure Laterality Date    HX PACEMAKER         Prior Level of Function/Home Situation: Lives alone, caregiver from 9-3, occasional use of SPC or RW during ambulation, history of several falls at home. Personal factors and/or comorbidities impacting plan of care:      Home Situation  Home Environment: Private residence  # Steps to Enter: 5  Rails to Enter: Yes  Hand Rails : Bilateral  One/Two Story Residence: One story  Living Alone: Yes  Support Systems: Other (comments) (Paid care giver 9-3, who cooks and cleans)  Patient Expects to be Discharged to[de-identified] Private residence  Current DME Used/Available at Home: Grab bars, Shower chair, Walker, rolling  Tub or Shower Type: Tub/Shower combination     EXAMINATION/PRESENTATION/DECISION MAKING:   Critical Behavior:  Neurologic State: Alert  Orientation Level: Oriented X4  Cognition: Follows commands, Impaired decision making, Poor safety awareness  Safety/Judgement: Decreased awareness of need for assistance, Decreased awareness of need for safety, Decreased insight into deficits  Hearing: Auditory  Auditory Impairment: None  Skin:  Intact  Edema: none noted  Range Of Motion:  AROM: Generally decreased, functional                       Strength:    Strength: Generally decreased, functional                    Tone & Sensation:   Tone: Normal              Sensation: Intact               Coordination:  Coordination: Generally decreased, functional  Vision:   Acuity: Within Defined Limits  Corrective Lenses: Glasses  Functional Mobility:  Bed Mobility:  Rolling: Supervision  Supine to Sit: Supervision  Sit to Supine: Supervision  Scooting: Supervision  Transfers:  Sit to Stand:  Moderate assistance (modA from bedside chair )  Stand to Sit: Contact guard assistance        Bed to Chair: Contact guard assistance              Balance:   Sitting: Intact  Standing: Impaired  Standing - Static: Good  Standing - Dynamic : Fair  Ambulation/Gait Training:  Distance (ft): 2 Feet (ft)  Assistive Device: Walker, rolling;Gait belt  Ambulation - Level of Assistance: Contact guard assistance        Gait Abnormalities: Decreased step clearance;Shuffling gait        Base of Support: Widened     Speed/Britney: Shuffled  Step Length: Left shortened;Right shortened                                                Functional Measure:  Barthel Index:      Bathin  Bladder: 5  Bowels: 10  Groomin  Dressin  Feeding: 10  Mobility: 0  Stairs: 0  Toilet Use: 5  Transfer (Bed to Chair and Back): 10  Total: 45         Barthel and G-code impairment scale:  Percentage of impairment CH  0% CI  1-19% CJ  20-39% CK  40-59% CL  60-79% CM  80-99% CN  100%   Barthel Score 0-100 100 99-80 79-60 59-40 20-39 1-19    0   Barthel Score 0-20 20 17-19 13-16 9-12 5-8 1-4 0      The Barthel ADL Index: Guidelines  1. The index should be used as a record of what a patient does, not as a record of what a patient could do. 2. The main aim is to establish degree of independence from any help, physical or verbal, however minor and for whatever reason. 3. The need for supervision renders the patient not independent. 4. A patient's performance should be established using the best available evidence. Asking the patient, friends/relatives and nurses are the usual sources, but direct observation and common sense are also important. However direct testing is not needed. 5. Usually the patient's performance over the preceding 24-48 hours is important, but occasionally longer periods will be relevant. 6. Middle categories imply that the patient supplies over 50 per cent of the effort. 7. Use of aids to be independent is allowed. Paulina Molina., Barthel, D.W. (2592). Functional evaluation: the Barthel Index. [facility] (14)2. Linda Holder wil MARCOS Vergara, Priscilla Flores., Raul Faith., Omar Subramanian, [address] [place] [address] (). Measuring the change indisability after inpatient rehabilitation; comparison of the responsiveness of the Barthel Index and Functional [place] Measure.  Journal of Neurology, Neurosurgery, and Psychiatry, 664), 090-092. JAMEE Knight, DANIKA Chen, & Tresa Goncalves M.A. (2004.) Assessment of post-stroke quality of life in cost-effectiveness studies: The usefulness of the Barthel Index and the EuroQoL-5D. Quality of Life Research, 13, 100-40         G codes: In compliance with CMSs Claims Based Outcome Reporting, the following G-code set was chosen for this patient based on their primary functional limitation being treated: The outcome measure chosen to determine the severity of the functional limitation was the Barthel Index with a score of 45/100 which was correlated with the impairment scale. · Mobility - Walking and Moving Around:               - CURRENT STATUS:    CK - 40%-59% impaired, limited or restricted               - GOAL STATUS:           CI - 1%-19% impaired, limited or restricted               - D/C STATUS:                       ---------------To be determined---------------      Physical Therapy Evaluation Charge Determination   History Examination Presentation Decision-Making   MEDIUM  Complexity : 1-2 comorbidities / personal factors will impact the outcome/ POC  MEDIUM Complexity : 3 Standardized tests and measures addressing body structure, function, activity limitation and / or participation in recreation  MEDIUM Complexity : Evolving with changing characteristics  MEDIUM Complexity : FOTO score of 26-74      Based on the above components, the patient evaluation is determined to be of the following complexity level: MEDIUM     Pain:  Pain Scale 1: Numeric (0 - 10)  Pain Intensity 1: 0              Activity Tolerance:   Hypotensive to 75/36 in seated position   Please refer to the flowsheet for vital signs taken during this treatment.   After treatment:   [ ]         Patient left in no apparent distress sitting up in chair  [X]         Patient left in no apparent distress in bed  [X]         Call bell left within reach  [X] Nursing notified  [ ]         Caregiver present  [X]         Bed alarm activated      COMMUNICATION/EDUCATION:   The patients plan of care was discussed with: Registered Nurse.  [X]         Fall prevention education was provided and the patient/caregiver indicated understanding. [X]         Patient/family have participated as able in goal setting and plan of care. [X]         Patient/family agree to work toward stated goals and plan of care. [ ]         Patient understands intent and goals of therapy, but is neutral about his/her participation. [ ]         Patient is unable to participate in goal setting and plan of care.      Thank you for this referral.  Misty Casiano, PT, DPT   Time Calculation: 12 mins

## 2017-03-14 NOTE — ROUTINE PROCESS
.. TRANSFER - IN REPORT:    Verbal report received from Collin Murphy RN(name) on Adriana Reinoso  being received from The ED  for routine progression of care      Report consisted of patients Situation, Background, Assessment and   Recommendations(SBAR). Information from the following report(s) SBAR, ED Summary, [facility] and Cardiac Rhythm paced was reviewed with the receiving nurse. Opportunity for questions and clarification was provided. Assessment completed upon patients arrival to unit and care assumed.  Skin assessment dual done with Dolly Galloway pt with large bruise on right groin and thigh from previous procedure

## 2017-03-14 NOTE — ROUTINE PROCESS
..  * No surgery found *  Bedside and Verbal shift change report given to [facility] (oncoming nurse) by Oralia Kapoor RN (offgoing nurse). Report included the following information SBAR, Kardex, [facility], Recent Results and Cardiac Rhythm Paced. Zone Phone:   [phone]      Significant changes during shift:  Admission        Patient Information    Christina Fuentes  66 y.o.  3/13/2017  3:05 PM by Yoli Kumar MD. Christina Fuentes was admitted from Home    Problem List    Patient Active Problem List    Diagnosis Date Noted    Generalized weakness 03/13/2017     Past Medical History:   Diagnosis Date    Asthma     COPD    [facility] ([facility] [address]     Heart failure ([facility] [address]          Core Measures:      Post Op Surgical (If Applicable): Activity Status:    OOB to Chair No  Ambulated this shift No   Bed Rest Yes    Supplemental O2: (If Applicable)    NC No  NRB No  Venti-mask No  On 0 Liters/min      LINES AND DRAINS:  piv 20g rac       DVT prophylaxis:    DVT prophylaxis Med- Yes  DVT prophylaxis SCD or LESLY- No     Wounds: (If Applicable)    Wounds- Not applicable    Location 0    Patient Safety:    Falls Score Total Score: 3  Safety Level_______  Bed Alarm On? Yes  Sitter?  No    Plan for upcoming shift:  See consults below        Discharge Plan: No too see CM     Active Consults:  IP CONSULT TO NEUROLOGY  IP CONSULT TO PSYCHIATRY  IP CONSULT TO CARDIOLOGY

## 2017-03-14 NOTE — PROGRESS NOTES
Initial Nutrition Assessment:    INTERVENTIONS/RECOMMENDATIONS:   · Meals/Snacks: Modify diet/texture/consistency/nutrients: Continue cardiac diet; add consistent carbohydrate diet to aid in BG control   · Supplements: Commercial supplement: Glucerna shakes BID    ASSESSMENT:   Patient medically noted for generalized weakness, hallucinations, and recent diarrhea. PMH for cardiomyopathy, DM, COPD, and heart failure. Patient reports decreased appetite and upset stomach recently but he feels fine this morning. Asking about breakfast which was brought in during my visit. Unsure of recent weight loss but reports a history of fluctuating weights. Patient reports drinking chocolate glucerna shakes twice a day at home. Menu provided and room service explained. Will add CCD to optimize BG control; start glucerna shakes BID. Diet Order: Cardiac  % Eaten:  No data found. Pertinent Medications: [x]Reviewed []Other: amiodarone, vitamin D, lasix, humalog, tradjenta, mag-ox, lopressor, protonix   Pertinent Labs: [x]Reviewed []Other: [phone]  Food Allergies: [x]None []Other   Last BM: 3/13   [x]Active     []Hyperactive  []Hypoactive       [] Absent BS  Skin:    [x] Intact   [] Incision  [] Breakdown  [] Other:    Anthropometrics:   Height: 6' 2\" (188 cm) Weight: 74.8 kg (165 lb)   IBW (%IBW):   ( ) UBW (%UBW):   (  %)   Last Weight Metrics:  Weight Loss Metrics 3/13/2017   Today's Wt 165 lb   BMI 21.18 kg/m2       BMI: Body mass index is 21.18 kg/(m^2). This BMI is indicative of:   []Underweight    [x]Normal    []Overweight    [] Obesity   [] Extreme Obesity (BMI>40)     Estimated Nutrition Needs (Based on):   2041 Kcals/day (BMR (1570) x 1. 3AF) , 75 g (1.0 g/kg bw) Protein  Carbohydrate:  At Least 130 g/day  Fluids: 2000 mL/day (1ml/kcal)    Pt expected to meet estimated nutrient needs: [x]Yes []No    NUTRITION DIAGNOSES:   Problem:  Altered nutrition-related lab values      Etiology: related to DM Signs/Symptoms: as evidenced by elevated -240      NUTRITION INTERVENTIONS:  Meals/Snacks: Modify diet/texture/consistency/nutrients   Supplements: Commercial supplement              GOAL:   BG trend <180 and PO intake >50% of meals/supplements next 3-5 days     LEARNING NEEDS (Diet, Food/Nutrient-Drug Interaction):    [x] None Identified   [] Identified and Education Provided/Documented   [] Identified and Pt declined/was not appropriate     Cultureal, Yarsani, OR Ethnic Dietary Needs:    [x] None Identified   [] Identified and Addressed     [x] Interdisciplinary Care Plan Reviewed/Documented    [x] Discharge Planning:  Consistent carbohydrate diet/low sodium. Glucerna 1-2x/day      MONITORING /EVALUATION:   Food/Nutrient Intake Outcomes:  Total energy intake  Physical Signs/Symptoms Outcomes: Weight/weight change, Electrolyte and renal profile, GI profile, Glucose profile    NUTRITION RISK:    [] High              [x] Moderate           []  Low  []  Minimal/Uncompromised    PT SEEN FOR:    []  MD Consult: []Calorie Count      []Diabetic Diet Education        []Diet Education     []Electrolyte Management     []General Nutrition Management and Supplements     []Management of Tube Feeding     []TPN Recommendations    [x]  RN Referral:  [x]MST score >=2     []Enteral/Parenteral Nutrition PTA     []Pregnant: Gestational DM or Multigestation     []Pressure Ulcer/Wound Care needs        []  Low BMI  []  DTR Referral       Les Mark  Pager [phone]                 Weekend Pager [phone]

## 2017-03-14 NOTE — CONSULTS
Psychiatry  Consult    Subjective:     Date of Evaluation:  3/14/2017    Reason for Referral:  Queta Healy was referred to the examiners from Medical floor for Hallucination . History of Presenting Problem: 66years old man  Who presented  With feeling  Weak  And not  Able  To  Walk well  And he  Had visual  Hallucination , he  Stated he called the police  Who came  With  Ambulance  And  They  Checked his vitals  And  Left and then  His  Friend  Came  And  Was  Checking on him  And he  Was  Still  See  Things and she  Called his  Nurse  And  Brought him to the hospital , his  Friend  Concern about him  Going back home  And  Stay by himself  As  He is not  Able  To take  Care of himself , patient  Stated  He  Sometimes  Misperceive things around him and   Think he is  Hallucinating  But he is not ,  He  Stated  He  Started  To   Feel that  Way  Since  He  Started one of the new  Medications ,  He  Denied  Feeling  Sad or  Depressed  And denied having memory  Problems  And his  Friend   Confirmed  He has no  Memory  Problems  But he  Sometimes  Get  Confused  And  Does not   Know  Where he is   And imagine things  Around  And  Then  He  Clear up ,  He  Denied  Suicidal  thoughts and  Denied  Hearing  Voices     Patient Active Problem List    Diagnosis Date Noted    Generalized weakness 03/13/2017     Past Medical History:   Diagnosis Date    Asthma     COPD    Diabetes ([facility] [address]     Heart failure ([facility] [address]       History reviewed. No pertinent family history. Social History   Substance Use Topics    Smoking status: Former Smoker    Smokeless tobacco: Not on file    Alcohol use No     Past Surgical History:   Procedure Laterality Date    HX PACEMAKER        Prior to Admission medications    Medication Sig Start Date End Date Taking? Authorizing Provider   metoprolol succinate (TOPROL-XL) 25 mg XL tablet Take 12.5 mg by mouth daily.    Yes Historical Provider   metFORMIN (GLUCOPHAGE) 1,000 mg tablet Take 1,000 mg by mouth two (2) times daily (with meals). Yes Historical Provider   potassium chloride SR (KLOR-CON 10) 10 mEq tablet Take 10 mEq by mouth daily. Yes Historical Provider   amiodarone (CORDARONE) 200 mg tablet Take 200 mg by mouth daily. Yes Ashlee Houser MD   ascorbic acid, vitamin C, 1,500 mg TbER Take 1,500 mg by mouth two (2) times a day. Yes Ashlee Houser MD   aspirin 81 mg chewable tablet Take 81 mg by mouth daily. Yes Ashlee Houser MD   calcium-cholecalciferol, d3, (CALCIUM 600 + D) 600-125 mg-unit tab Take 1 Tab by mouth daily. Yes Ashlee Houser MD   furosemide (LASIX) 20 mg tablet Take 20 mg by mouth daily. Yes Ashlee Houser MD   glimepiride (AMARYL) 4 mg tablet Take 4 mg by mouth every morning. Yes Ashlee Houser MD   lisinopril (PRINIVIL, ZESTRIL) 2.5 mg tablet Take 2.5 mg by mouth daily. Yes Ashlee Houser MD   mexiletine (MEXITIL) 150 mg capsule Take 150 mg by mouth two (2) times a day. Yes Ashlee Houser MD   multivitamin (ONE A DAY) tablet Take 1 Tab by mouth daily. Yes Ashlee Houser MD   quiNIDine gluconate SR (DURA-TABS) 324 mg SR tablet Take 648 mg by mouth two (2) times a day. Yes Ashlee Houser MD   SITagliptin (JANUVIA) 100 mg tablet Take 100 mg by mouth daily. Yes Ashlee Houser MD   UBIQUINONE PO Take 300 mg by mouth daily. Yes Ashlee Houser MD   vitamin E (AQUA GEMS) 400 unit capsule Take 400 Units by mouth daily.    Yes Ashlee Houser MD     No Known Allergies       Objective:     Patient Vitals for the past 8 hrs:   BP Temp Pulse Resp SpO2   03/14/17 1113 98/58 97.6 °F (36.4 °C) - 18 92 %   03/14/17 0850 - - - - 94 %   03/14/17 0815 106/58 98 °F (36.7 °C) 68 16 95 %       Mental Status exam: He is engaging well in talking  And has appropriate  Affect and has  Irritable mood ,  Denied hearing  Voices  And  Denied visual hallucination  Now ,  Denied Si or I  And has  Intact cognitive  Function and  Fair insight and  Judgment            Impression: Acute   Confusional   State  Versus brief psychotic  Reaction      Active Problems:    Generalized weakness (3/13/2017)          Plan: Continue his  Current medication      Recommendations for Treatment/Conditions:  Outpatient follow up recommended after release    Referral To:    may need  Assistant living  Facility  , he  Needs to be  Reevaluated  And follow up  With psychiatrist  As outpatient     Competency Statement: At the current time, the patient is competent to make informed consent regarding their current medical care and discharge planning and/or financial decisions.

## 2017-03-15 LAB
ANA SER QL: NEGATIVE
ANION GAP BLD CALC-SCNC: 9 MMOL/L (ref 5–15)
BUN SERPL-MCNC: 30 MG/DL (ref 6–20)
BUN/CREAT SERPL: 24 (ref 12–20)
CALCIUM SERPL-MCNC: 8.4 MG/DL (ref 8.5–10.1)
CHLORIDE SERPL-SCNC: 102 MMOL/L (ref 97–108)
CO2 SERPL-SCNC: 32 MMOL/L (ref 21–32)
CREAT SERPL-MCNC: 1.24 MG/DL (ref 0.7–1.3)
GLUCOSE BLD STRIP.AUTO-MCNC: 155 MG/DL (ref 65–100)
GLUCOSE BLD STRIP.AUTO-MCNC: 160 MG/DL (ref 65–100)
GLUCOSE BLD STRIP.AUTO-MCNC: 231 MG/DL (ref 65–100)
GLUCOSE BLD STRIP.AUTO-MCNC: 236 MG/DL (ref 65–100)
GLUCOSE SERPL-MCNC: 158 MG/DL (ref 65–100)
MAGNESIUM SERPL-MCNC: 1.8 MG/DL (ref 1.6–2.4)
POTASSIUM SERPL-SCNC: 3.8 MMOL/L (ref 3.5–5.1)
SEE BELOW:, 164879: NORMAL
SERVICE CMNT-IMP: ABNORMAL
SODIUM SERPL-SCNC: 143 MMOL/L (ref 136–145)

## 2017-03-15 PROCEDURE — 83735 ASSAY OF MAGNESIUM: CPT | Performed by: INTERNAL MEDICINE

## 2017-03-15 PROCEDURE — 97535 SELF CARE MNGMENT TRAINING: CPT | Performed by: OCCUPATIONAL THERAPIST

## 2017-03-15 PROCEDURE — 94640 AIRWAY INHALATION TREATMENT: CPT

## 2017-03-15 PROCEDURE — 74011250636 HC RX REV CODE- 250/636: Performed by: INTERNAL MEDICINE

## 2017-03-15 PROCEDURE — 82962 GLUCOSE BLOOD TEST: CPT

## 2017-03-15 PROCEDURE — 80048 BASIC METABOLIC PNL TOTAL CA: CPT | Performed by: INTERNAL MEDICINE

## 2017-03-15 PROCEDURE — 97116 GAIT TRAINING THERAPY: CPT

## 2017-03-15 PROCEDURE — 97530 THERAPEUTIC ACTIVITIES: CPT | Performed by: OCCUPATIONAL THERAPIST

## 2017-03-15 PROCEDURE — 97530 THERAPEUTIC ACTIVITIES: CPT

## 2017-03-15 PROCEDURE — 65660000000 HC RM CCU STEPDOWN

## 2017-03-15 PROCEDURE — 74011250637 HC RX REV CODE- 250/637: Performed by: INTERNAL MEDICINE

## 2017-03-15 PROCEDURE — 74011000250 HC RX REV CODE- 250: Performed by: INTERNAL MEDICINE

## 2017-03-15 PROCEDURE — 36415 COLL VENOUS BLD VENIPUNCTURE: CPT | Performed by: INTERNAL MEDICINE

## 2017-03-15 RX ORDER — IPRATROPIUM BROMIDE AND ALBUTEROL SULFATE 2.5; .5 MG/3ML; MG/3ML
3 SOLUTION RESPIRATORY (INHALATION)
Status: DISCONTINUED | OUTPATIENT
Start: 2017-03-16 | End: 2017-03-16

## 2017-03-15 RX ORDER — FUROSEMIDE 20 MG/1
20 TABLET ORAL DAILY
Status: DISCONTINUED | OUTPATIENT
Start: 2017-03-15 | End: 2017-03-17 | Stop reason: HOSPADM

## 2017-03-15 RX ORDER — IPRATROPIUM BROMIDE AND ALBUTEROL SULFATE 2.5; .5 MG/3ML; MG/3ML
3 SOLUTION RESPIRATORY (INHALATION)
Status: DISCONTINUED | OUTPATIENT
Start: 2017-03-15 | End: 2017-03-17 | Stop reason: HOSPADM

## 2017-03-15 RX ADMIN — INSULIN LISPRO 2 UNITS: 100 INJECTION, SOLUTION INTRAVENOUS; SUBCUTANEOUS at 17:36

## 2017-03-15 RX ADMIN — ASPIRIN 81 MG CHEWABLE TABLET 81 MG: 81 TABLET CHEWABLE at 10:03

## 2017-03-15 RX ADMIN — Medication 10 ML: at 05:39

## 2017-03-15 RX ADMIN — INSULIN LISPRO 2 UNITS: 100 INJECTION, SOLUTION INTRAVENOUS; SUBCUTANEOUS at 21:42

## 2017-03-15 RX ADMIN — INSULIN LISPRO 2 UNITS: 100 INJECTION, SOLUTION INTRAVENOUS; SUBCUTANEOUS at 07:56

## 2017-03-15 RX ADMIN — ENOXAPARIN SODIUM 40 MG: 40 INJECTION SUBCUTANEOUS at 21:43

## 2017-03-15 RX ADMIN — MEXILETINE HYDROCHLORIDE 150 MG: 150 CAPSULE ORAL at 18:46

## 2017-03-15 RX ADMIN — IPRATROPIUM BROMIDE AND ALBUTEROL SULFATE 3 ML: .5; 3 SOLUTION RESPIRATORY (INHALATION) at 20:08

## 2017-03-15 RX ADMIN — IPRATROPIUM BROMIDE AND ALBUTEROL SULFATE 3 ML: .5; 3 SOLUTION RESPIRATORY (INHALATION) at 13:17

## 2017-03-15 RX ADMIN — Medication 10 ML: at 12:53

## 2017-03-15 RX ADMIN — Medication 10 ML: at 21:45

## 2017-03-15 RX ADMIN — FUROSEMIDE 20 MG: 20 TABLET ORAL at 12:51

## 2017-03-15 RX ADMIN — LINAGLIPTIN 5 MG: 5 TABLET, FILM COATED ORAL at 10:03

## 2017-03-15 RX ADMIN — IPRATROPIUM BROMIDE AND ALBUTEROL SULFATE 3 ML: .5; 3 SOLUTION RESPIRATORY (INHALATION) at 07:53

## 2017-03-15 RX ADMIN — INSULIN LISPRO 3 UNITS: 100 INJECTION, SOLUTION INTRAVENOUS; SUBCUTANEOUS at 12:49

## 2017-03-15 RX ADMIN — METOPROLOL SUCCINATE 12.5 MG: 25 TABLET, EXTENDED RELEASE ORAL at 21:40

## 2017-03-15 NOTE — PROGRESS NOTES
CARDIOLOGY Progress Note    Patient ID:  Patient: Jordyn Prasad  MRN: [id number]  Age: 66 y.o.  : 1945    Date of  Admission: 3/13/2017  3:05 PM   PCP:  Madeleine Paul NP   Usual cardiologist:  [facility]    Assessment: 1. Cardiomyopathy, unspecified, reported EF 35-40%. 2. Acute on chronic systolic congestive heart failure with elevated proBNP, bilateral pleural effusions and parenchymal abnormality possibly edema. 3. Suspect history of paroxysmal VT given med list of amiodarone, mexilitine, quinidine. Sounds like he's had VT ablation in the past.  4. Mildly elevated troponin with flat trend. Not ACS/MI. 5. Acute kidney failure with hyperkalemia (improved). 6. Hypotension, orthostatic. BP won't tolerate full CHF regimen. 7. COPD. 8. Diabetes mellitus type 2 without mention of complication. 9. Altered mental status--metabolic encephalopathy probably atop dementia, improved. Noncontrast head CT without bleed, infarct, or mass. Atrophy noted. 10. Chronic ASA therapy. 11. BIV-ICD implanted at [facility] recently (still has steristrips). 12. Acute diarrheal illness, stable. 13. Chronic weakness, but acute component improving. Plan:     1. Agree with continuing amiodarone, mexilitine. He admits to some noncompliance. 2. Would keep off quinidine until he can see his regular cardiologist.  3. Continue ASA. 4. Continue beta-blocker, changed to metoprolol ER 12.5 daily in evening. 5. Stop ACEI permanently, BP won't tolerate and admitted with LANDEN with hyperkalemia. 6. Continue diuretic. 7. BIV-ICD will be interrogated by me today, see scanned report for details. Normal function. Will sign off tomorrow, but please call with any issues or concerns. [x]       High complexity decision making was performed in this patient at high risk for decompensation with multiple organ involvement.     Jordyn Prasad is a 66 y.o. male with a history of the above, I was asked to see him due to heart failure. He has had diarrhea a couple days PTA, but chronic weakness. Walks with assistance. Strength is getting a little better. TODAY:  He currently denies chest pain, dizziness, SOB. Thinking more clearly.       No Known Allergies       Current Facility-Administered Medications   Medication Dose Route Frequency    furosemide (LASIX) tablet 20 mg  20 mg Oral DAILY    metoprolol succinate (TOPROL-XL) XL tablet 12.5 mg  12.5 mg Oral QHS    sodium chloride (NS) flush 5-10 mL  5-10 mL IntraVENous Q8H    sodium chloride (NS) flush 5-10 mL  5-10 mL IntraVENous PRN    acetaminophen (TYLENOL) tablet 650 mg  650 mg Oral Q4H PRN    naloxone (NARCAN) injection 0.4 mg  0.4 mg IntraVENous PRN    ondansetron (ZOFRAN) injection 4 mg  4 mg IntraVENous Q4H PRN    bisacodyl (DULCOLAX) tablet 5 mg  5 mg Oral DAILY PRN    enoxaparin (LOVENOX) injection 40 mg  40 mg SubCUTAneous Q24H    aspirin chewable tablet 81 mg  81 mg Oral DAILY    amiodarone (CORDARONE) tablet 200 mg  200 mg Oral DAILY    mexiletine (MEXITIL) capsule 150 mg  150 mg Oral BID    linagliptin (TRADJENTA) tablet 5 mg  5 mg Oral DAILY    insulin lispro (HUMALOG) injection   SubCUTAneous AC&HS    glucose chewable tablet 16 g  4 Tab Oral PRN    dextrose (D50W) injection syrg 12.5-25 g  12.5-25 g IntraVENous PRN    glucagon (GLUCAGEN) injection 1 mg  1 mg IntraMUSCular PRN    albuterol-ipratropium (DUO-NEB) 2.5 MG-0.5 MG/3 ML  3 mL Nebulization Q6H RT       Review of Symptoms:    Respiratory: + chronic HARRIS, negative for cough, sputum production, wheezing, pleuritic pain   Cardiology: negative for chest pain, palpitations, orthopnea, PND, edema  Gastrointestinal: negative for abdominal pain, N/V, dysphagia  Genitourinary: negative for frequency, urgency, dysuria, hematuria     Objective:      Physical Exam:  Temp (24hrs), Av.9 °F (36.6 °C), Min:97.6 °F (36.4 °C), Max:98.4 °F (36.9 °C)    Patient Vitals for the past 8 hrs:   Pulse   03/15/17 1504 92   03/15/17 1455 82   03/15/17 1037 78   03/15/17 0845 72    Patient Vitals for the past 8 hrs:   Resp   03/15/17 1037 16   03/15/17 0845 18    Patient Vitals for the past 8 hrs:   BP   03/15/17 1504 141/69   03/15/17 1455 106/65   03/15/17 1037 90/42   03/15/17 1013 (!) 88/42   03/15/17 0845 96/41          Intake/Output Summary (Last 24 hours) at 03/15/17 1637  Last data filed at 03/15/17 0542   Gross per 24 hour   Intake                0 ml   Output             1400 ml   Net            -1400 ml       Nondiaphoretic, not in acute distress. No scleral icterus, mucous membranes moist, conjuctivae pink, no xanthelasma. L chest BIV-ICD site with steristrips, no hematoma. Unlabored, clear to auscultation bilaterally except coarse crackles at posterior base, overall symmetric air movement but decreased flow. Regular rate and rhythm, no murmur, pericardial rub, knock, or gallop. No peripheral edema. Palpable radial pulses bilaterally. Abdomen, soft, nontender, nondistended. Extremities without cyanosis or clubbing. Muscle tone OK but bulk reduced. Skin warm and dry. No rashes or ulcers. Neuro grossly nonfocal.  No tremor. Awake, appropriate today. CARDIOGRAPHICS and STUDIES, I reviewed:    Telemetry:  SR with sequential V pacing. No VT.    ECG:  SR with sequential BIV pacing.        Labs:  Recent Labs      03/14/17   0407  03/13/17   1758  03/13/17   1604   CPK   --    --   87   TROIQ  0.09*  0.08*  0.10*     No results found for: CHOL, CHOLX, CHLST, CHOLV, HDL, LDL, DLDL, LDLC, DLDLP, TGL, TGLX, TRIGL, TRIGP, CHHD, CHHDX  Recent Labs      03/13/17   1758   INR  1.2*   PTP  12.3*   APTT  27.3      Recent Labs      03/15/17   0554  03/14/17   0407  03/13/17   1604   NA  143  145  143   K  3.8  4.3  5.7*   CL  102  105  104   CO2  32  32  34*   BUN  30*  33*  38*   CREA  1.24  1.36*  1.40*   GLU  158*  149*  240*   CA  8.4*  8.8  8.6   ALB   --    --   2.9*   WBC --   7.7  8.4   HGB   --   12.2  11.9*   HCT   --   39.5  39.1   PLT   --   188  200     Recent Labs      03/14/17   1755  03/13/17   1604   SGOT   --   28   AP   --   78   TP   --   5.8*   ALB   --   2.9*   GLOB   --   2.9   GGT  32   --      No components found for: GLPOC  No results for input(s): PH, PCO2, PO2 in the last 72 hours.         Francheska Randhawa MD  3/15/2017

## 2017-03-15 NOTE — PROGRESS NOTES
Hospitalist Progress Note    NAME: Naz Lim   :  1945   MRN:  [id number]       Interim Hospital Summary: 66 y.o. male whom presented on 3/13/2017 with      Assessment / Plan:  Acute systolic heart failure, EF 35-40%/cardiomyopathy s/p ablation/defib placement  Elevated Troponin  HTN  -pt follows with [facility]  -trop peaked  -there is some questions concerning noncompliance with home meds and medications are being titrated up. All meds resumed while inpt, pt had been hypotensive on home meds. -holding antihypertensive due to low bp  -home meds include: lasix, asa, quinidine, bb, mexiletine, amiodarone, lisinopril     Hyperkalemia  -K 5.7 on admission, resolved     Generalized weakness  Fall precautions  -per pt and family, this is sudden onset. [facility] from [facility] with recs of intpt rehab, however he declined. This is likely due to decondition in the setting of worsening of chronic medical issues. -head CT neg  -up in chair/ PT/OT,  Pt is not a safe discharge to home.  -will benefit from rehab, however peristently refused     Acute kidney injury/? CKD  -unclear baseline  -will hold nephrotoxic agents for now including amaryl and lisinopril     T2DM  -recent A1C 7.9 from paper chart review from pt  -holding amaryl due to ynes  -continue SSI     COPD  -stable. Not on O2  -duonebs, prn nebs     Visual Hallucinations  -4 months hx. No hx of psychiatric issues. Debilitating per family. ? dementia  -denies SI/HI,no hallucination seen admission  -psych consulted        Code Status: Full  Surrogate Decision Maker: brother [phone]     DVT Prophylaxis: lovenox  GI Prophylaxis: not indicated     Baseline: independent       Subjective:     Chief Complaint / Reason for Physician Visit  Seen at bedside. No acute complaints. Discussed with RN events overnight.      Review of Systems:  Symptom Y/N Comments  Symptom Y/N Comments   Fever/Chills n   Chest Pain n    Poor Appetite n Edema y mild   Cough n   Abdominal Pain n    Sputum    Joint Pain     SOB/HARRIS n   Pruritis/Rash     Nausea/vomit    Tolerating PT/OT     Diarrhea    Tolerating Diet     Constipation    Other       Could NOT obtain due to:      Objective:     VITALS:   Last 24hrs VS reviewed since prior progress note. Most recent are:  Patient Vitals for the past 24 hrs:   Temp Pulse Resp BP SpO2   03/15/17 1013 - - - (!) 88/42 -   03/15/17 0845 97.6 °F (36.4 °C) 72 18 96/41 90 %   03/15/17 0753 - - - - 90 %   03/15/17 0542 97.9 °F (36.6 °C) 70 18 124/77 95 %   03/14/17 2333 97.7 °F (36.5 °C) 72 16 114/65 96 %   03/14/17 1851 98.4 °F (36.9 °C) 79 16 113/64 -   03/14/17 1601 98 °F (36.7 °C) 70 20 107/64 94 %   03/14/17 1113 97.6 °F (36.4 °C) - 18 98/58 92 %       Intake/Output Summary (Last 24 hours) at 03/15/17 1027  Last data filed at 03/15/17 0542   Gross per 24 hour   Intake                0 ml   Output             1400 ml   Net            -1400 ml        PHYSICAL EXAM:  General: WD, WN. Alert, cooperative, no acute distress    EENT:  EOMI. Anicteric sclerae. MMM  Resp:  Crackles at [employer]. No wheezing. CV:  Regular  rhythm,  No edema  GI:  Soft, Non distended, Non tender.  +Bowel sounds  Neurologic:  Alert and oriented X 3, normal speech,   Psych:   Good insight. Not anxious nor agitated  Skin:  No rashes. No jaundice    Reviewed most current lab test results and cultures  YES  Reviewed most current radiology test results   YES  Review and summation of old records today    NO  Reviewed patient's current orders and MAR    YES  PMH/SH reviewed - no change compared to H&P  ________________________________________________________________________  Care Plan discussed with:    Comments   Patient x    Family      RN x    Care Manager     Consultant                        Multidiciplinary team rounds were held today with , nursing, pharmacist and clinical coordinator.   Patient's plan of care was discussed; medications were reviewed and discharge planning was addressed. ________________________________________________________________________  Total NON critical care TIME:  35   Minutes    Total CRITICAL CARE TIME Spent:   Minutes non procedure based      Comments   >50% of visit spent in counseling and coordination of care     ________________________________________________________________________  Lalitha Vaughn MD     Procedures: see electronic medical records for all procedures/Xrays and details which were not copied into this note but were reviewed prior to creation of Plan. LABS:  I reviewed today's most current labs and imaging studies.   Pertinent labs include:  Recent Labs      03/14/17   0407  03/13/17   1604   WBC  7.7  8.4   HGB  12.2  11.9*   HCT  39.5  39.1   PLT  188  200     Recent Labs      03/15/17   0554  03/14/17   0407  03/13/17   1758  03/13/17   1604   NA  143  145   --   143   K  3.8  4.3   --   5.7*   CL  102  105   --   104   CO2  32  32   --   34*   GLU  158*  149*   --   240*   BUN  30*  33*   --   38*   CREA  1.24  1.36*   --   1.40*   CA  8.4*  8.8   --   8.6   MG  1.8   --    --   2.1   ALB   --    --    --   2.9*   TBILI   --    --    --   0.4   SGOT   --    --    --   28   ALT   --    --    --   37   INR   --    --   1.2*   --        Signed: Lalitha Vaughn MD

## 2017-03-15 NOTE — PROGRESS NOTES
Occupational Therapy Goals  Initiated 3/14/2017  1. Patient will perform grooming standing at sink with independence within 7 day(s). 2. Patient will perform upper body dressing with modified independence within 7 day(s). 3. Patient will perform lower body dressing with modified independence within 7 day(s). 4. Patient will perform toilet transfers with modified independence within 7 day(s). 5. Patient will perform all aspects of toileting with independence within 7 day(s). 6. Patient will perform ADL setup with modified independence within 7 day(s).        Occupational Therapy TREATMENT  Patient: Leona Heart (19 y.o. male)  Date: 3/15/2017  Diagnosis: Generalized weakness <principal problem not specified>       Precautions:      ASSESSMENT:  Patient motivated and cooperative t/o session. Frequent cueing needed for attention, sequencing, safety and to compensate for decreased STM, often needing to be reminded of what he was about to do. Overall he is at a supervision level for bed mobility, is CGA for toileting and standing grooming, and is CGA to min A for dressing ADLs. Activity tolerance remains limited, experiencing SOB in room mobility. SpO2 and HR stable t/o all activity. Progression toward goals:  []       Improving appropriately and progressing toward goals  [x]       Improving slowly and progressing toward goals  []       Not making progress toward goals and plan of care will be adjusted     PLAN:  Patient continues to benefit from skilled intervention to address the above impairments. Continue treatment per established plan of care. Discharge Recommendations:  Paul Ferreira  Further Equipment Recommendations for Discharge:  TBD         OBJECTIVE DATA SUMMARY:   Cognitive/Behavioral Status:  Neurologic State: Alert  Orientation Level: Oriented to person;Oriented to place  Cognition: Decreased attention/concentration; Follows commands; Impaired decision making;Memory loss;Poor safety awareness        Safety/Judgement: Decreased awareness of need for assistance;Decreased awareness of need for safety;Decreased insight into deficits  Functional Mobility and Transfers for ADLs:  Bed Mobility:  Rolling: Supervision  Supine to Sit: Supervision     Scooting: Supervision  Transfers:  Sit to Stand: Minimum assistance     Bed to Chair: Minimum assistance (ambulating with a RW)          Toilet Transfer : Minimum assistance           Bathroom Mobility: Minimum assistance (ambulating with a RW)      Balance:  Sitting: Intact  Standing: Impaired  Standing - Static: Fair  Standing - Dynamic : Fair  ADL Intervention:  Grooming  Washing Hands: Contact guard assistance (standing at sink)  Cues: Verbal cues provided;Visual cues provided    Upper Body [address]: Minimum  assistance (performed seated)  Cues: Tactile cues provided;Verbal cues provided;Visual cues provided    Lower Body Dressing Assistance  Underpants: Contact guard assistance (initiated in sitting)  Socks: Supervision/set-up (performed long sitting in bed. )  Cues: Tactile cues provided;Verbal cues provided;Visual cues provided    Toileting  Bladder Hygiene: Supervision/set-up (seated)  Bowel Hygiene: Supervision/set-up (seated)  Clothing Management: Contact guard assistance  Cues: Verbal cues provided    Cognitive Retraining  Organizing/Sequencing: Breaking task down; Two step sequence  Attention to Task: Distractibility; Single task  Safety/Judgement: Decreased awareness of need for assistance;Decreased awareness of need for safety;Decreased insight into deficits    After treatment:   [x] Patient left in no apparent distress sitting up in chair  [] Patient left in no apparent distress in bed  [x] Call bell left within reach  [x] Nursing notified  [] Caregiver present  [x] Bed alarm activated    COMMUNICATION/COLLABORATION:   The patients plan of care was discussed with: Physical Therapist and Registered Nurse    Adithya Grace JEOVANY Coleman, OTR/L  Time Calculation: 22 mins

## 2017-03-15 NOTE — ROUTINE PROCESS
No surgery found *  Bedside and Verbal shift change report given to [address] [place] [address] (oncoming nurse) by Joby Lerma RN (offgoing nurse). Report included the following information SBAR, Kardex, MAR, Recent Results and Cardiac Rhythm Paced.      Zone Phone: [phone]          Significant changes during shift:   1) patient states he might consider a rehab facility, is concerned about how long he will be in hospital      Patient Information      Jose Friedman  71 y.o.  3/13/2017 3:05 PM by Julio C Franks MD. Jose Friedman was admitted from Home      Problem List              Patient Active Problem List      Diagnosis Date Noted    Generalized weakness 03/13/2017               Past Medical History:   Diagnosis Date    Asthma         COPD    Diabetes ([address]       Heart failure ([address]                  Core Measures:          Activity Status:      OOB to Chair yes  Ambulated this shift No   Bed Rest no      Supplemental O2: (If Applicable)      NC No  NRB No  Venti-mask No  On room air Liters/min          LINES AND DRAINS: piv 20g rac          DVT prophylaxis:      DVT prophylaxis Med- Yes, Lovenox  DVT prophylaxis SCD or LESLY- No       Wounds: (If Applicable)      Wounds- Not applicable      Location 0      Patient Safety:      Falls Score Total Score: 3  Safety Level_______  Bed Alarm On? Yes  Sitter?  No      Plan for upcoming shift: EEG              Discharge Plan: No no CM note.         Active Consults:

## 2017-03-15 NOTE — INTERDISCIPLINARY ROUNDS
[facility] Interdisciplinary rounds were held today to discuss patient plan of care and outcomes. The interdisciplinary team collaborated to facilitate discharge planning needs. The following members were present; Nurse Manager, Kelly Nicholson [phone], Pharmacist, Primary Nurse, , Physical Therapy,   Physician, and Case Management. PLAN:  DC planning in progress, PT rec home with HH.  CM working with patient to determine best DC plan

## 2017-03-15 NOTE — PROGRESS NOTES
Wound care completed per orders to both feet, R heel. Also checked dressing on sacrum - dry and intact and connected to wound vac. Applied ointment to scrotum. Patient tolerated well.

## 2017-03-15 NOTE — PROGRESS NOTES
Pt is a 67 y/o [place] man who is single [place]. He has a care aide who comes during the day to help with some things. Pt's brother, Christina Nolasco, also lives nearby. Pt states that he normally drives himself to appts and that his brother or a friend can transport him at discharge. Pt has medicare as well as [employer], although pt states he has never used [employer]. OT and PT have evaluated pt and recommended either HH with home assistance 24 hours a day or a SNF placement. MSW intern began discussing these options with pt and he stated \"I haven't seen any physical therapy yet. I want to get up and start walking and no one has gotten me up. \" Pt has OT and PT notes on his chart from 3/14/17. MSW intern let pt know this and pt stated that he could not remember the appts. MSW intern discussed pt's options. Pt stated that he wanted to stay at home with [facility] OT and PT, but expressed concern about paying for the cost of an aide. Pt stated that his mother stayed at a SNF once, [facility], and that he thought that one was all right. Pt's long term goal is to remain in his home for as long as possible. Pt expressed confusion about why he has been experiencing weakness and if and how long rehab will help him to walk again. MSW intern made a plan with pt to get more information about his questions and to come back later to help him make a decision about where he will go at discharge. MSW intern followed up with Dr. Yash Clay who expressed concern about medication compliance and memory trouble and pt's safety at home, because there is little support around. She noted that pt stated he has a dog who he is concerned about getting care for. 2:15 pm    MSW intern talked to pt's OT, who confirmed that pt should not go directly home without 24/7 supervision. MSW intern returned to pt's room per his request. Pt's friend who lives nearby was at bedside.  Pt's friend encouraged pt that friends would care for his dog and she would watch his house if he went to a SNF. MSW intern discussed SNF options with pt a second time and relayed Dr. Lakia Quevedo and the OT's recommendations. Pt agreed to have a referral sent to [facility]. MSW intern placed a referral for [facility] through [employer]. MSW intern will continue to follow and assist with discharge planning needs. 3:03 pm    Pt has been accepted for placement at [facility]. Care Management Interventions  PCP Verified by CM: Yes  Mode of Transport at Discharge:  Other (see comment) (Pt's brother or a friend will transport him at discharge. )  Transition of Care Consult (CM Consult): Discharge Planning  Physical Therapy Consult: Yes  Occupational Therapy Consult: Yes  Current Support Network: Lives Alone ([place], he has a few neightbors and an aide that come sometimes during the day to hel with things. )  Confirm Follow Up Transport: Self (Pt states that he normally drives himself to appts. )  Plan discussed with Pt/Family/Caregiver: Yes  Freedom of Choice Offered: Yes    Dora Mayes, MSW Intern    Jennifer Mishra, MSW  [employer]  Ext [phone]

## 2017-03-15 NOTE — CARDIO/PULMONARY
CP REHAB NOTE    Chart Review: Patient admitted for fatigue and weakness over past three weeks. Medical History: diabetes, HF, COPD, asthma  EF 35-40%, per cardiology note  Former Smoker     Chart reviewed and pt visited for CHF teaching. The CHF teaching packet was provided. The pt also received information regarding the low sodium diet, TLC diet, Benefits of Exercise, and How to Read a Food Label. Instruction given on s/s of CHF, checking weight every am and calling MD if weight is up 2-3 lbs in a day or 5 lbs in a week (or as directed by the physician), fluid/Na restrictions, s/s of worsening CHF and when to call MD.  Discussed the CHF \"zones\" and subsequent actions with pt. Reviewed activity as tolerated with frequent rest periods as needed, taking medications as prescribed, and the importance of follow up visits with physician. Unsure how much of teaching patient was understanding. He appeared distracted. Would benefit from reenforcement.

## 2017-03-15 NOTE — PROGRESS NOTES
Heart Failure Care Coordinator visited the patient in room. Provided introduction to self, and explanation of the Care Coordinator role. Patient was provided a copy of the [facility] letter.  Patient made aware of contact information should any questions arise before or after discharge.

## 2017-03-15 NOTE — PROGRESS NOTES
Chief Complaint: weakness  Patient laying in bed in no distress. Discussed his condition and he clarified that his debility has been long standing. He is followed by Dr. Alexandro Randle for his heart related issues and he is non compliant with his pulmonary follow up. He did not feel that it was helping. He had been prescribed inhalers in the past but found them too costly and of little benefit. He asked me for advice about rehabilitation and I strongly advised that he should consider this. He feels that he has little hope in living independently. Assesment and Plan    1. Debility  Severe most likely secondary to his underlying medical issues (COPD and CHF)  Would benefit from inpatient rehabilitation  He is hesitant about commiting to inpatient therapy. He was deemed competent to make his own decision. He is aware of his limitations and the fact that his living conditions will need to change.      2.Heart failure  EF 35-40%     3. Diabetes  Continue tradjenta     4. Diabetic neuropathy  continue strict diabetes control.     5. Altered mental state  Lewy Body dementia vs delirium secondary to underlying disease   EEG pending    Allergies  Review of patient's allergies indicates no known allergies.      Medications  Current Facility-Administered Medications   Medication Dose Route Frequency    furosemide (LASIX) tablet 20 mg  20 mg Oral DAILY    metoprolol succinate (TOPROL-XL) XL tablet 12.5 mg  12.5 mg Oral QHS    sodium chloride (NS) flush 5-10 mL  5-10 mL IntraVENous Q8H    sodium chloride (NS) flush 5-10 mL  5-10 mL IntraVENous PRN    acetaminophen (TYLENOL) tablet 650 mg  650 mg Oral Q4H PRN    naloxone (NARCAN) injection 0.4 mg  0.4 mg IntraVENous PRN    ondansetron (ZOFRAN) injection 4 mg  4 mg IntraVENous Q4H PRN    bisacodyl (DULCOLAX) tablet 5 mg  5 mg Oral DAILY PRN    enoxaparin (LOVENOX) injection 40 mg  40 mg SubCUTAneous Q24H    aspirin chewable tablet 81 mg  81 mg Oral DAILY    amiodarone (CORDARONE) tablet 200 mg  200 mg Oral DAILY    mexiletine (MEXITIL) capsule 150 mg  150 mg Oral BID    linagliptin (TRADJENTA) tablet 5 mg  5 mg Oral DAILY    insulin lispro (HUMALOG) injection   SubCUTAneous AC&HS    glucose chewable tablet 16 g  4 Tab Oral PRN    dextrose (D50W) injection syrg 12.5-25 g  12.5-25 g IntraVENous PRN    glucagon (GLUCAGEN) injection 1 mg  1 mg IntraMUSCular PRN    albuterol-ipratropium (DUO-NEB) 2.5 MG-0.5 MG/3 ML  3 mL Nebulization Q6H RT      Medical History  Past Medical History:   Diagnosis Date    Asthma     COPD    [facility] [facility]     Heart failure ([facility] [facility]       Review of Systems   Constitutional: Positive for malaise/fatigue. Negative for chills and fever. HENT: Negative for ear pain. Eyes: Negative for pain and discharge. Respiratory: Negative for cough and hemoptysis. Cardiovascular: Negative for chest pain and claudication. Gastrointestinal: Negative for constipation and diarrhea. Genitourinary: Negative for flank pain and hematuria. Musculoskeletal: Positive for back pain and falls. Negative for myalgias. Skin: Negative for itching and rash. Neurological: Positive for sensory change and weakness. Negative for focal weakness, seizures and headaches. Endo/Heme/Allergies: Negative for environmental allergies. Does not bruise/bleed easily. Psychiatric/Behavioral: Negative for depression and hallucinations. Exam:    Visit Vitals    BP 90/42    Pulse 78    Temp 97.8 °F (36.6 °C)    Resp 16    Ht 6' 2\" (1.88 m)    Wt 165 lb (74.8 kg)    SpO2 90%    BMI 21.18 kg/m2      Physical Exam   Constitutional: He is oriented to person, place, and time. HENT:   Head: Normocephalic and atraumatic. Eyes: Conjunctivae and EOM are normal. Pupils are equal, round, and reactive to light. Neck: Neck supple. No JVD present. Carotid bruit is not present. No thyromegaly present. Cardiovascular: Normal rate, regular rhythm and normal heart sounds. Pulmonary/Chest: Effort normal and breath sounds normal. No respiratory distress. He has no wheezes. He has no rales. Abdominal: Soft. Bowel sounds are normal. He exhibits no distension. There is no tenderness. Neurological: He is oriented to person, place, and time. He has normal strength. He has a normal Finger-Nose-Finger Test.   Skin: No rash noted. He is dyspneic. No erythema. Psychiatric: His speech is normal.      Neurologic Exam      Mental Status   Oriented to person, place, and time. Speech: speech is normal   Level of consciousness: alert  Knowledge: good.      Cranial Nerves      CN II   Visual fields full to confrontation.      CN III, IV, VI   Pupils are equal, round, and reactive to light. Extraocular motions are normal.      CN V   Facial sensation intact.      CN VII   Facial expression full, symmetric.      CN VIII   Hearing: intact     CN IX, X   Palate: symmetric  Right gag reflex: normal  Left gag reflex: normal     CN XI   Right trapezius strength: normal  Left trapezius strength: normal     CN XII   Tongue: not atrophic  Fasciculations: absent  Tongue deviation: none     Motor Exam   Wasitng of distal small muscles of hand and feet   5/5 strength upper extremities symmetric  Lower extremities 4/5 weakness in a symmetric fashion       Sensory Exam   Sensory loss in a length dependant fashion affecting all modalities       Imaging    CT Results (most recent):    Results from Hospital Encounter encounter on 03/13/17   CT HEAD WO CONT        Impression IMPRESSION: Mild atrophy. No acute findings.               Lab Review    Lab Results   Component Value Date/Time    WBC 7.7 03/14/2017 04:07 AM    HCT 39.5 03/14/2017 04:07 AM    HGB 12.2 03/14/2017 04:07 AM    PLATELET 831 07/45/9146 04:07 AM       Lab Results   Component Value Date/Time    Sodium 143 03/15/2017 05:54 AM    Potassium 3.8 03/15/2017 05:54 AM    Chloride 102 03/15/2017 05:54 AM    CO2 32 03/15/2017 05:54 AM    Glucose 158 03/15/2017 05:54 AM    BUN 30 03/15/2017 05:54 AM    Creatinine 1.24 03/15/2017 05:54 AM    Calcium 8.4 03/15/2017 05:54 AM       Lab Results   Component Value Date/Time    Vitamin B12 1913 03/14/2017 05:55 PM    Folate 30.4 03/14/2017 05:55 PM

## 2017-03-15 NOTE — PROGRESS NOTES
Problem: Mobility Impaired (Adult and Pediatric)  Goal: *Acute Goals and Plan of Care (Insert Text)  Physical Therapy Goals  Initiated 3/14/2017  1. Patient will move from supine to sit and sit to supine , scoot up and down and roll side to side in bed with supervision/set-up within 7 day(s). 2. Patient will transfer from bed to chair and chair to bed with supervision/set-up using the least restrictive device within 7 day(s). 3. Patient will perform sit to stand with supervision/set-up within 7 day(s). 4. Patient will ambulate with supervision/set-up for 75 feet with the least restrictive device within 7 day(s). PHYSICAL THERAPY TREATMENT  Patient: Selma Valerio (77 y.o. male)  Date: 3/15/2017  Diagnosis: Generalized weakness <principal problem not specified>       Precautions:Fall        ASSESSMENT:  Patient in bed on arrival and agreeable to therapy. Patient demonstrates confusion and easily distracted increasing his fall risk. BP stable this date and he was able to tolerate 2 bouts of ambulation. Patient declined to attempt ambulation in hallway and requested to stay in room. He requires intermittent cues for pacing activity and walker management for safety/fall prevention. He fatigues quickly. Will benefit from continued skilled PT and recommending SNF rehab placement to increase his safety before returning home. Progression toward goals:  [ ]    Improving appropriately and progressing toward goals  [X]    Improving slowly and progressing toward goals  [ ]    Not making progress toward goals and plan of care will be adjusted       PLAN:  Patient continues to benefit from skilled intervention to address the above impairments. Continue treatment per established plan of care. Discharge Recommendations:  Paul Ferreira  Further Equipment Recommendations for Discharge:  Defer to SNF       SUBJECTIVE:   Patient stated I think I will be fine if I just keep moving for a couple of days.  OBJECTIVE DATA SUMMARY:   Critical Behavior:  Neurologic State: Alert  Orientation Level: Oriented to person, Oriented to place  Cognition: Decreased attention/concentration, Follows commands, Impaired decision making, Memory loss, Poor safety awareness  Safety/Judgement: Decreased awareness of need for assistance, Decreased awareness of need for safety, Decreased insight into deficits  Functional Mobility Training:  Bed Mobility:  Rolling: Supervision  Supine to Sit: Supervision  Scooting: Supervision     Transfers:  Sit to Stand: Minimum assistance  Bed to Chair: Minimum assistance (ambulating with a RW)     Balance:  Sitting: Intact  Standing: Impaired  Standing - Static: Fair  Standing - Dynamic : Fair  Ambulation/Gait Training:  Distance (ft): 25 Feet (ft) (25 x 2 reps) sitting rest break between reps  Assistive Device: Walker, rolling;Gait belt  Ambulation - Level of Assistance: Contact guard assistance;Minimal assistance (Min A with multiple LOB)  Gait Abnormalities: Decreased step clearance;Shuffling gait; Path deviations  Speed/Kaden: Shuffled  Step Length: Right shortened;Left shortened                 Unsteady, fluctuating kaden. Multiple LOB. Cues for walker safety and pacing   Pain:   No c/o pain     Activity Tolerance:   VS stable  Please refer to the flowsheet for vital signs taken during this treatment.   After treatment:   [X]    Patient left in no apparent distress sitting up in chair  [ ]    Patient left in no apparent distress in bed  [X]    Call bell left within reach  [X]    Nursing notified  [ ]    Caregiver present  [X]    Bed alarm activated      COMMUNICATION/COLLABORATION:   The patients plan of care was discussed with: Registered Nurse     Claudell Fells, PT, DPT   Time Calculation: 27 mins

## 2017-03-15 NOTE — PROGRESS NOTES
Spiritual Care Partner Volunteer visited patient in Neuro on March 15, 2017.   Documented by:  POLO Villegas, [facility], [address]     Paging Service  [phone])

## 2017-03-16 LAB
GLUCOSE BLD STRIP.AUTO-MCNC: 155 MG/DL (ref 65–100)
GLUCOSE BLD STRIP.AUTO-MCNC: 262 MG/DL (ref 65–100)
GLUCOSE BLD STRIP.AUTO-MCNC: 298 MG/DL (ref 65–100)
GLUCOSE BLD STRIP.AUTO-MCNC: 90 MG/DL (ref 65–100)
SERVICE CMNT-IMP: ABNORMAL
SERVICE CMNT-IMP: NORMAL
VIT B1 BLD-SCNC: 216.4 NMOL/L (ref 66.5–200)

## 2017-03-16 PROCEDURE — 94640 AIRWAY INHALATION TREATMENT: CPT

## 2017-03-16 PROCEDURE — 97530 THERAPEUTIC ACTIVITIES: CPT

## 2017-03-16 PROCEDURE — 74011250636 HC RX REV CODE- 250/636: Performed by: INTERNAL MEDICINE

## 2017-03-16 PROCEDURE — 82962 GLUCOSE BLOOD TEST: CPT

## 2017-03-16 PROCEDURE — 65660000000 HC RM CCU STEPDOWN

## 2017-03-16 PROCEDURE — 74011250637 HC RX REV CODE- 250/637: Performed by: INTERNAL MEDICINE

## 2017-03-16 PROCEDURE — 74011000250 HC RX REV CODE- 250: Performed by: INTERNAL MEDICINE

## 2017-03-16 PROCEDURE — 97116 GAIT TRAINING THERAPY: CPT

## 2017-03-16 RX ORDER — IPRATROPIUM BROMIDE AND ALBUTEROL SULFATE 2.5; .5 MG/3ML; MG/3ML
3 SOLUTION RESPIRATORY (INHALATION)
Status: DISCONTINUED | OUTPATIENT
Start: 2017-03-16 | End: 2017-03-16 | Stop reason: SDUPTHER

## 2017-03-16 RX ADMIN — INSULIN LISPRO 2 UNITS: 100 INJECTION, SOLUTION INTRAVENOUS; SUBCUTANEOUS at 09:06

## 2017-03-16 RX ADMIN — ENOXAPARIN SODIUM 40 MG: 40 INJECTION SUBCUTANEOUS at 22:05

## 2017-03-16 RX ADMIN — METOPROLOL SUCCINATE 12.5 MG: 25 TABLET, EXTENDED RELEASE ORAL at 22:04

## 2017-03-16 RX ADMIN — ASPIRIN 81 MG CHEWABLE TABLET 81 MG: 81 TABLET CHEWABLE at 09:07

## 2017-03-16 RX ADMIN — INSULIN LISPRO 5 UNITS: 100 INJECTION, SOLUTION INTRAVENOUS; SUBCUTANEOUS at 13:27

## 2017-03-16 RX ADMIN — INSULIN LISPRO 3 UNITS: 100 INJECTION, SOLUTION INTRAVENOUS; SUBCUTANEOUS at 22:06

## 2017-03-16 RX ADMIN — ACETAMINOPHEN 650 MG: 325 TABLET, FILM COATED ORAL at 20:45

## 2017-03-16 RX ADMIN — MEXILETINE HYDROCHLORIDE 150 MG: 150 CAPSULE ORAL at 17:20

## 2017-03-16 RX ADMIN — AMIODARONE HYDROCHLORIDE 200 MG: 200 TABLET ORAL at 09:07

## 2017-03-16 RX ADMIN — ACETAMINOPHEN 650 MG: 325 TABLET, FILM COATED ORAL at 11:39

## 2017-03-16 RX ADMIN — IPRATROPIUM BROMIDE AND ALBUTEROL SULFATE 3 ML: .5; 3 SOLUTION RESPIRATORY (INHALATION) at 07:28

## 2017-03-16 RX ADMIN — Medication 5 ML: at 22:05

## 2017-03-16 RX ADMIN — Medication 10 ML: at 17:19

## 2017-03-16 RX ADMIN — MEXILETINE HYDROCHLORIDE 150 MG: 150 CAPSULE ORAL at 09:07

## 2017-03-16 RX ADMIN — FUROSEMIDE 20 MG: 20 TABLET ORAL at 09:07

## 2017-03-16 RX ADMIN — Medication 10 ML: at 03:19

## 2017-03-16 RX ADMIN — LINAGLIPTIN 5 MG: 5 TABLET, FILM COATED ORAL at 09:08

## 2017-03-16 NOTE — PROGRESS NOTES
CARDIOLOGY Progress Note    Patient ID:  Patient: Adriana Reinoso  MRN: [id number]  Age: 66 y.o.  : 1945    Date of  Admission: 3/13/2017  3:05 PM   PCP:  Timothy Robert NP   Usual cardiologist:  [facility]    Assessment: 1. Cardiomyopathy, unspecified, reported EF 35-40%. 2. Acute on chronic systolic congestive heart failure with elevated proBNP, bilateral pleural effusions and parenchymal abnormality possibly edema. 3. Suspect history of paroxysmal VT given med list of amiodarone, mexilitine, quinidine. Sounds like he's had VT ablation in the past.  4. Mildly elevated troponin with flat trend. Not ACS/MI. 5. Acute kidney failure with hyperkalemia (improved). 6. Hypotension, orthostatic. BP won't tolerate full CHF regimen. 7. COPD. 8. Diabetes mellitus type 2 without mention of complication. 9. Altered mental status--metabolic encephalopathy probably atop dementia, improved. Noncontrast head CT without bleed, infarct, or mass. Atrophy noted. 10. Chronic ASA therapy. 11. BIV-ICD implanted at [facility] recently (still has steristrips). 12. Acute diarrheal illness, stable. 13. Chronic weakness, but acute component improving. Plan:     1. Agree with continuing amiodarone and mexilitine, both at OP doses. 2. I would be OK with restarting quinidine on discharge. We talked further about it, he's been taking it for years. 3. Continue ASA. 4. Continue beta-blocker, metoprolol ER 12.5 daily in evening. 5. Stop ACEI permanently, BP won't tolerate and he was admitted with LANDEN with hyperkalemia. 6. Continue diuretic. 7. BIV-ICD interrogated yesterday, see scanned report for details. Normal function. Will sign off, but please call with any issues or concerns. I answered all his questions, including discussing each of his antiarrhythmic in terms of potential side effects. [x]       High complexity decision making was performed in this patient.     Idalia Wyatt Dash Ayon is a 66 y.o. male with a history of the above, I was asked to see him due to heart failure. He has had diarrhea a couple days PTA, but chronic weakness. Walks with assistance. Strength is getting a little better. TODAY:  He currently denies chest pain, dizziness, SOB. Asks a lot of questions today.       No Known Allergies       Current Facility-Administered Medications   Medication Dose Route Frequency    furosemide (LASIX) tablet 20 mg  20 mg Oral DAILY    albuterol-ipratropium (DUO-NEB) 2.5 MG-0.5 MG/3 ML  3 mL Nebulization Q6H PRN    metoprolol succinate (TOPROL-XL) XL tablet 12.5 mg  12.5 mg Oral QHS    sodium chloride (NS) flush 5-10 mL  5-10 mL IntraVENous Q8H    sodium chloride (NS) flush 5-10 mL  5-10 mL IntraVENous PRN    acetaminophen (TYLENOL) tablet 650 mg  650 mg Oral Q4H PRN    naloxone (NARCAN) injection 0.4 mg  0.4 mg IntraVENous PRN    ondansetron (ZOFRAN) injection 4 mg  4 mg IntraVENous Q4H PRN    bisacodyl (DULCOLAX) tablet 5 mg  5 mg Oral DAILY PRN    enoxaparin (LOVENOX) injection 40 mg  40 mg SubCUTAneous Q24H    aspirin chewable tablet 81 mg  81 mg Oral DAILY    amiodarone (CORDARONE) tablet 200 mg  200 mg Oral DAILY    mexiletine (MEXITIL) capsule 150 mg  150 mg Oral BID    linagliptin (TRADJENTA) tablet 5 mg  5 mg Oral DAILY    insulin lispro (HUMALOG) injection   SubCUTAneous AC&HS    glucose chewable tablet 16 g  4 Tab Oral PRN    dextrose (D50W) injection syrg 12.5-25 g  12.5-25 g IntraVENous PRN    glucagon (GLUCAGEN) injection 1 mg  1 mg IntraMUSCular PRN       Review of Symptoms:    Respiratory: + chronic HARRIS, negative for cough, sputum production, wheezing, pleuritic pain   Cardiology: negative for chest pain, palpitations, orthopnea, PND, edema  Gastrointestinal: negative for abdominal pain, N/V, dysphagia  Genitourinary: negative for frequency, urgency, dysuria, hematuria     Objective:      Physical Exam:  Temp (24hrs), Av.9 °F (36.6 °C), Min:97.3 °F (36.3 °C), Max:98.2 °F (36.8 °C)    Patient Vitals for the past 8 hrs:   Pulse   03/16/17 1526 76   03/16/17 1106 71   03/16/17 0904 76    Patient Vitals for the past 8 hrs:   Resp   03/16/17 1526 18   03/16/17 1106 18   03/16/17 0904 20    Patient Vitals for the past 8 hrs:   BP   03/16/17 1526 111/80   03/16/17 1154 96/56   03/16/17 1106 (!) 89/57   03/16/17 0904 103/52          Intake/Output Summary (Last 24 hours) at 03/16/17 1627  Last data filed at 03/16/17 0303   Gross per 24 hour   Intake                0 ml   Output              175 ml   Net             -175 ml       Nondiaphoretic, not in acute distress. No scleral icterus, mucous membranes moist, conjuctivae pink, no xanthelasma. L chest BIV-ICD site with steristrips, no hematoma. Unlabored, clear to auscultation bilaterally except coarse crackles at posterior base, overall symmetric air movement but decreased flow. Regular rate and rhythm, no murmur, pericardial rub, knock, or gallop. No peripheral edema. Palpable radial pulses bilaterally. Abdomen, soft, nontender, nondistended. Extremities without cyanosis or clubbing. Muscle tone OK but bulk reduced. Skin warm and dry. No rashes or ulcers. Neuro grossly nonfocal.  No tremor. Awake, appropriate today. CARDIOGRAPHICS and STUDIES, I reviewed:    Telemetry:  SR with sequential V pacing. No VT.    ECG:  SR with sequential BIV pacing.        Labs:  Recent Labs      03/14/17 0407 03/13/17   1758   TROIQ  0.09*  0.08*     No results found for: CHOL, CHOLX, CHLST, CHOLV, HDL, LDL, DLDL, LDLC, DLDLP, TGL, TGLX, TRIGL, TRIGP, CHHD, CHHDX  Recent Labs      03/13/17   1758   INR  1.2*   PTP  12.3*   APTT  27.3      Recent Labs      03/15/17   0554  03/14/17   0407   NA  143  145   K  3.8  4.3   CL  102  105   CO2  32  32   BUN  30*  33*   CREA  1.24  1.36*   GLU  158*  149*   CA  8.4*  8.8   WBC   --   7.7   HGB   --   12.2   HCT   --   39.5   PLT   --   188     Recent Labs 03/14/17   1755   GGT  32     No components found for: GLPOC  No results for input(s): PH, PCO2, PO2 in the last 72 hours.         Amrik Pineda MD  3/16/2017

## 2017-03-16 NOTE — CARDIO/PULMONARY
CP REHAB NOTE--pt is on CHF bundle list     Chart Review: Patient admitted for fatigue and weakness over past three weeks. Medical History: diabetes, HF, COPD, asthma,pacemaker. EF 35-40%, per cardiology note  Former Smoker     Attempt to meet with pt for CHF follow up teaching-currently asleep sitting up in bed-with no family present in room. Will continue to follow when pt awake for teaching or family present.

## 2017-03-16 NOTE — ROUTINE PROCESS
No surgery found *  Bedside and Verbal shift change report given to [address] (oncoming nurse) by Tavo Greene RN (offgoing nurse). Report included the following information SBAR, Kardex, MAR, Recent Results and Cardiac Rhythm Paced.      Zone Phone: [phone]          Significant changes during shift:  None  Patient Information  Valrie Nyhan  71 y.o.  3/13/2017 3:05 PM by Nury Kincaid MD. Jordyn April was admitted from [facility]  Problem List      [address]   Patient Active Problem List      Diagnosis Date Noted    Generalized weakness 03/13/2017                  Past Medical History:   Diagnosis Date    Asthma         COPD    Diabetes ([facility] [facility]       Heart failure ([facility] [address]                  Core Measures:          Activity Status:      OOB to Chair yes  Ambulated this shift No   Bed Rest no      Supplemental O2: (If Applicable)      NC No  NRB No  Venti-mask No  On room air Liters/min          LINES AND DRAINS: piv 20g rac          DVT prophylaxis:      DVT prophylaxis Med- Yes, Lovenox  DVT prophylaxis SCD or LESLY- No       Wounds: (If Applicable)      Wounds- Not applicable      Location 0      Patient Safety:      Falls Score Total Score: 3  Safety Level_______  Bed Alarm On? Yes  Sitter? No      Plan for upcoming shift: EEG              Discharge Plan: Yes CM following.  D/C to SNF for rehab recommended .        Active Consults:

## 2017-03-16 NOTE — PROGRESS NOTES
No surgery found *  Bedside and Verbal shift change report given to [address] (oncoming nurse) by Laurie Manuel RN (offgoing nurse). Report included the following information SBAR, Kardex, MAR, Recent Results and Cardiac Rhythm Paced.      Zone Phone: [phone]          Significant changes during shift: an episode of hypotension   Patient Information  Kush Montero  66 y.o.  3/13/2017 3:05 PM by Sana Rasmussen MD. Memo Solis was admitted from [facility]  Problem List      [address]   Patient Active Problem List      Diagnosis Date Noted    Generalized weakness 03/13/2017                  Past Medical History:   Diagnosis Date    Asthma         COPD    Diabetes ([facility] [address]       Heart failure ([facility] [address]                  Core Measures:          Activity Status:      OOB to Chair yes  Ambulated this shift yes   Bed Rest no      Supplemental O2: (If Applicable)      NC No  NRB No  Venti-mask No  On room air Liters/min          LINES AND DRAINS: piv 20g rac          DVT prophylaxis:      DVT prophylaxis Med- Yes, Lovenox  DVT prophylaxis SCD or LESLY- No       Wounds: (If Applicable)      Wounds- Not applicable      Location 0      Patient Safety:      Falls Score Total Score: 3  Safety Level_______  Bed Alarm On? Yes  Sitter? No      Plan for upcoming shift: possible discharge tomorrow               Discharge Plan: Yes CM following. D/C to SNF for rehab recommended .

## 2017-03-16 NOTE — PROGRESS NOTES
Hospitalist Progress Note    NAME: Christina Fuentes   :  1945   MRN:  [id number]       Interim Hospital Summary: 66 y.o. male whom presented on 3/13/2017 with      Assessment / Plan:  Acute systolic heart failure, EF 35-40%/cardiomyopathy s/p ablation/defib placement  Elevated Troponin  HTN  -pt follows with [facility]  -trop peaked  -hx of noncompliance with home meds and medications are being titrated up. All meds resumed while inpt, pt had been hypotensive on home meds. -cont lasix, amiodarone, mexiletine, BB  -hold quinnidine until pt sees his regular cardiology, cardiology following    Hyperkalemia  -K 5.7 on admission, resolved  -ACEi discontinued on discharge     Generalized weakness  Fall precautions  -per pt and family, this is sudden onset. [facility] with recs of intpt rehab, however he declined. This is likely due to decondition in the setting of worsening of chronic medical issues. -head CT neg  -up in chair/ PT/OT, discharge to rehab     Acute kidney injury/? CKD  -unclear baseline  -will hold nephrotoxic agents for now including amaryl and lisinopril     T2DM  -recent A1C 7.9 from paper chart review from pt  -holding amaryl due to ynes  -continue SSI     COPD  -stable. Not on O2  -duonebs, prn nebs     Visual Hallucinations  -4 months hx. No hx of psychiatric issues. Debilitating per family. ? dementia  -denies SI/HI,no hallucination seen admission  -psych consulted        Code Status: Full  Surrogate Decision Maker: brother [phone]     DVT Prophylaxis: lovenox  GI Prophylaxis: not indicated     Baseline: independent       Subjective:     Chief Complaint / Reason for Physician Visit  Seen at bedside. Pt had some questions on why quinidine being held. Attempted to explain to pt, however he insisted that cardiology talks to him. Discussed with RN events overnight.      Review of Systems:  Symptom Y/N Comments  Symptom Y/N Comments   Fever/Chills n Chest Pain n    Poor Appetite n   Edema n    Cough n   Abdominal Pain n    Sputum    Joint Pain     SOB/HARRIS n   Pruritis/Rash     Nausea/vomit    Tolerating PT/OT     Diarrhea    Tolerating Diet     Constipation    Other       Could NOT obtain due to:      Objective:     VITALS:   Last 24hrs VS reviewed since prior progress note. Most recent are:  Patient Vitals for the past 24 hrs:   Temp Pulse Resp BP SpO2   03/16/17 1526 98.2 °F (36.8 °C) 76 18 111/80 96 %   03/16/17 1154 - - - 96/56 -   03/16/17 1106 98 °F (36.7 °C) 71 18 (!) 89/57 96 %   03/16/17 0904 97.8 °F (36.6 °C) 76 20 103/52 91 %   03/16/17 0728 - - - - 92 %   03/16/17 0301 97.7 °F (36.5 °C) 73 20 118/65 94 %   03/15/17 2320 98.1 °F (36.7 °C) 72 18 108/54 93 %   03/15/17 2135 - 80 - 107/50 -   03/15/17 2010 - - - - 92 %   03/15/17 1927 97.3 °F (36.3 °C) 79 22 104/47 97 %   03/15/17 1701 98 °F (36.7 °C) 77 18 106/65 93 %       Intake/Output Summary (Last 24 hours) at 03/16/17 1542  Last data filed at 03/16/17 0303   Gross per 24 hour   Intake                0 ml   Output              175 ml   Net             -175 ml        PHYSICAL EXAM:  General: WD, WN. Alert, cooperative, no acute distress    EENT:  EOMI. Anicteric sclerae. MMM  Resp:  Crackles at [employer]. No wheezing. CV:  Regular  rhythm,  No edema  GI:  Soft, Non distended, Non tender.  +Bowel sounds  Neurologic:  Alert and oriented X 3, normal speech,   Psych:   Good insight. Not anxious nor agitated  Skin:  No rashes.   No jaundice    Reviewed most current lab test results and cultures  YES  Reviewed most current radiology test results   YES  Review and summation of old records today    NO  Reviewed patient's current orders and MAR    YES  PMH/SH reviewed - no change compared to H&P  ________________________________________________________________________  Care Plan discussed with:    Comments   Patient x    Family      RN x    Care Manager     Consultant                        Multidiciplinary team rounds were held today with , nursing, pharmacist and clinical coordinator. Patient's plan of care was discussed; medications were reviewed and discharge planning was addressed. ________________________________________________________________________  Total NON critical care TIME:  35   Minutes    Total CRITICAL CARE TIME Spent:   Minutes non procedure based      Comments   >50% of visit spent in counseling and coordination of care     ________________________________________________________________________  Mai Kearney MD     Procedures: see electronic medical records for all procedures/Xrays and details which were not copied into this note but were reviewed prior to creation of Plan. LABS:  I reviewed today's most current labs and imaging studies.   Pertinent labs include:  Recent Labs      03/14/17   0407  03/13/17   1604   WBC  7.7  8.4   HGB  12.2  11.9*   HCT  39.5  39.1   PLT  188  200     Recent Labs      03/15/17   0554  03/14/17   0407  03/13/17   1758  03/13/17   1604   NA  143  145   --   143   K  3.8  4.3   --   5.7*   CL  102  105   --   104   CO2  32  32   --   34*   GLU  158*  149*   --   240*   BUN  30*  33*   --   38*   CREA  1.24  1.36*   --   1.40*   CA  8.4*  8.8   --   8.6   MG  1.8   --    --   2.1   ALB   --    --    --   2.9*   TBILI   --    --    --   0.4   SGOT   --    --    --   28   ALT   --    --    --   37   INR   --    --   1.2*   --        Signed: Mai Kearney MD

## 2017-03-16 NOTE — PROGRESS NOTES
Problem: Mobility Impaired (Adult and Pediatric)  Goal: *Acute Goals and Plan of Care (Insert Text)  Physical Therapy Goals  Initiated 3/14/2017  1. Patient will move from supine to sit and sit to supine , scoot up and down and roll side to side in bed with supervision/set-up within 7 day(s). 2. Patient will transfer from bed to chair and chair to bed with supervision/set-up using the least restrictive device within 7 day(s). 3. Patient will perform sit to stand with supervision/set-up within 7 day(s). 4. Patient will ambulate with supervision/set-up for 75 feet with the least restrictive device within 7 day(s). PHYSICAL THERAPY TREATMENT  Patient: Selma Valerio (30 y.o. male)  Date: 3/16/2017  Diagnosis: Generalized weakness <principal problem not specified>       Precautions: Fall      ASSESSMENT:  Pt Rober Rand ambulates increased distance and requires overall minimal assistance/contact guard assist for flat surface gait using device. Pt appears with increased cooperation with PT today. He continues to require cues and education for safe walker and transfer techniques. Pt stating R knee pain 7/10 which he attributes to baseline arthritis. Progression toward goals:  [ ]    Improving appropriately and progressing toward goals  [X]    Improving slowly and progressing toward goals  [ ]    Not making progress toward goals and plan of care will be adjusted       PLAN:  Patient continues to benefit from skilled intervention to address the above impairments. Continue treatment per established plan of care. Discharge Recommendations:  Paul Ferreira   Further Equipment Recommendations for Discharge:  none       SUBJECTIVE:   Patient stated No, I can't, when asked regarding ability to perform controlled stand to sit. Despite statement pt performs with good control and improved techniques.        OBJECTIVE DATA SUMMARY:   Critical Behavior:  Neurologic State: Alert  Orientation Level: Oriented X4  Cognition: Decreased attention/concentration, Follows commands  Safety/Judgement: Decreased awareness of need for assistance, Decreased awareness of need for safety, Decreased insight into deficits  Functional Mobility Training:  Bed Mobility:     Supine to Sit: Additional time;Supervision     Scooting: Additional time;Supervision        Transfers:  Sit to Stand: Minimum assistance; Additional time; performed x 3. Stand to Sit: Minimum assistance;Contact guard assistance                             Balance:  Sitting: Without support  Sitting - Static: Good (unsupported)  Sitting - Dynamic: Good (unsupported)  Standing: With support  Standing - Static: Fair  Standing - Dynamic : Fair  Ambulation/Gait Training:  Distance (ft):  (25' + 61' with seated rest between trials.)  Assistive Device: Walker, rolling;Gait belt  Ambulation - Level of Assistance: Contact guard assistance        Gait Abnormalities: Antalgic;Decreased step clearance        Base of Support: Widened  Stance: Right decreased  Speed/Britney: Pace decreased (<100 feet/min)  Step Length: Left shortened                             Pt ambulates with flexed trunk and extended UEs; despite education he refuses to attempt ambulation closer to walker for improved safety, mechanics, and reduced weight bearing to RLE. Therapeutic Exercises:   Pt cued for ankle pumps, refuses to perform. Pain:  Pain Scale 1: Numeric (0 - 10)  Pain Intensity 1: 7  Location: R knee  Interventions: rest, reposition, pt dons off the shelf compression brace to R knee (wears at baseline). RN notified and working to provide medications. Activity Tolerance:   Limited by pain and fatigue. SpO2 95% with activity. Please refer to the flowsheet for vital signs taken during this treatment.   After treatment:   [X]    Patient left in no apparent distress sitting up in chair  [ ]    Patient left in no apparent distress in bed  [X]    Call bell left within reach  [X]    Nursing notified  [ ]    Caregiver present  [X]    Chair alarm activated     Pt educated regarding safety precautions including proper footwear and need to contact staff for assistance with all mobility. Pt verbalizes understanding.          COMMUNICATION/COLLABORATION:   The patients plan of care was discussed with: Registered Nurse and Rehabilitation Attendant     Hanley Litten, PT, DPT   Time Calculation: 25 mins

## 2017-03-16 NOTE — PROGRESS NOTES
Nutrition Assessment:    INTERVENTIONS/RECOMMENDATIONS:   Meals/Snacks: General/healthful diet: Consistent carbohydrate/cardiac diet. Supplements: Commercial supplement: Continue Glucerna BID    ASSESSMENT:   Patient medically noted for systolic heart failure, cardiomyopathy, HTN, and general weakness. PMH for DM and COPD. Patient working with other staff at time of attempted visit. Continue CCD for optimal BG control. Glucerna shakes BID. Monitor appetite/PO and acceptance of supplements. Diet Order: Cardiac, Consistent carb (Glucerna BID)  % Eaten:  No data found. Pertinent Medications: [x] Reviewed []Other: amiodarone, lasix, humalog, tradjenta, metoprolol   Pertinent Labs: [x]Reviewed  []Other: -[phone]  Food Allergies: [x]None []Other:     Last BM: 3/14   [x]Active     []Hyperactive  []Hypoactive       [] Absent  BS  Skin:    [x] Intact   [] Incision  [] Breakdown   []Edema   []Other:    Anthropometrics: Height: 6' 2\" (188 cm) Weight: 73.3 kg (161 lb 11.2 oz)    IBW (%IBW):   ( ) UBW (%UBW):   (  %)    BMI: Body mass index is 20.76 kg/(m^2). This BMI is indicative of:  []Underweight   [x]Normal   []Overweight   [] Obesity   [] Extreme Obesity (BMI>40)  Last Weight Metrics:  Weight Loss Metrics 3/15/2017   Today's Wt 161 lb 11.2 oz   BMI 20.76 kg/m2       Estimated Nutrition Needs (Based on): 2041 Kcals/day (BMR (1570) x 1. 3AF) , 75 g (1.0 g/kg bw) Protein  Carbohydrate: At Least 130 g/day  Fluids: 2000 mL/day     Pt expected to meet estimated nutrient needs: [x]Yes []No    NUTRITION DIAGNOSES:   Problem:  Altered nutrition-related lab values      Etiology: related to DM     Signs/Symptoms: as evidenced by elevated BG       NUTRITION INTERVENTIONS:  Meals/Snacks: General/healthful diet   Supplements: Commercial supplement              GOAL:   BG trend <180 and PO intake >70% of meals/supplements next 4-6 days    NUTRITION MONITORING AND EVALUATION   Food/Nutrient Intake Outcomes:  Total energy intake  Physical Signs/Symptoms Outcomes: Weight/weight change, Electrolyte and renal profile, Glucose profile    Previous Goal Met:   [] Met              [x] Progressing Towards Goal              [] Not Progressing Towards Goal   Previous Recommendations:   [x] Implemented          [] Not Implemented          [] Not Applicable    LEARNING NEEDS (Diet, Food/Nutrient-Drug Interaction):    [x] None Identified   [] Identified and Education Provided/Documented   [] Identified and Pt declined/was not appropriate     Cultural, Taoist, OR Ethnic Dietary Needs:    [x] None Identified   [] Identified and Addressed     [x] Interdisciplinary Care Plan Reviewed/Documented    [x] Discharge Planning: Consistent carbohydrate/heart healthy diet    [] Participated in Interdisciplinary Rounds    NUTRITION RISK:    [] High              [] Moderate           [x]  Low  []  Minimal/Uncompromised      [facility]  Pager [phone]              Weekend Pager [phone]

## 2017-03-16 NOTE — PROGRESS NOTES
Pt has been accepted at [facility]. MSW intern discussed with pt's nurse that he will need ambulance transport at discharge. MSW intern discussed this with pt and he is in agreement with go by ambulance to [facility]. MSW intern delivered the second IM letter to pt. MSW intern placed a copy of the second IM and [address] letter on pt's bedside nursing chart. 2:16 pm    MSW intern got a call from Niki Johnson ([phone]) with [employer]. They provide his home health services when he is at home. She will continue to follow the pt when he transitions to [facility] and assist with planning when he goes home. She requested an update on pt's discharge time when it is known. 2:55 pm    Pt will be discharged tomorrow. MSW intern contacted [facility] to update them about the plan. MSW intern sent a referral to [facility] through [employer] requesting an ambulance transport at 12:00pm.     Care Management Interventions  PCP Verified by CM:  Yes  Mode of Transport at Discharge: ALS (Pt is unstable when walking and will need to be transporte by ambulance)  Transition of Care Consult (CM Consult): SNF ([place] [id number])  Physical Therapy Consult: Yes  Occupational Therapy Consult: Yes  Current Support Network: Lives Alone ([place], he has a few neightbors and an aide that come sometimes during the day to hel with things. )  Confirm Follow Up Transport: Self (Pt states that he normally drives himself to appts. )  Plan discussed with Pt/Family/Caregiver: Yes  Freedom of Choice Offered: Yes  Discharge Location  Discharge Placement: Skilled nursing facility ([place] [phone])    Shane StevinsonDora bloom MSW Intern    [facility]  Ext [phone]

## 2017-03-16 NOTE — CARDIO/PULMONARY
CP REHAB NOTE--pt is on CHF bundle list      Chart Review: Patient admitted for fatigue and weakness over past three weeks. Medical History: diabetes, HF, COPD, asthma,pacemaker. EF 35-40%, per cardiology note  Former Smoker     CR staff seen by pt now awake. Hospitalist in to review d/c plans with pt. He was displeased with the changes to his antiarrythmic medications. Dr. Yash Clay attempted to explain temporary adjustments to prescriptions while he is at [facility] and that he can re-start medications as prescribed by his current [facility] cardiologist if he desires. Pt perseverating on this concern. Dr. Yash Clay advised pt that she would contact Dr. Ernestina Bell to re-visit pt to provide explanation of medication adjustment. This was a follow-up visit to answer questions and reinforce prior teaching re: CHF, S&Ss, medication management, Low NA diet, daily weights and when to call the doctor. Pt verbalized understanding.

## 2017-03-16 NOTE — PROCEDURES
[address]   [place], [address]   EEG       Name: Gregorio Cabral   MR#:  [id number]   :  1945   Account #:  [de-identified]    Date of Procedure:  03/15/2017   Date of Adm:  2017       EXAM DATE: 03/15/2017    EXAM NUMBER: [id number]      DESCRIPTION OF PROCEDURE: Electrodes were applied in accordance with the international 10-20 system of electrode placement. In addition to EEG, limited EKG was also recorded. EEG was reviewed in both bipolar and referential montage. DESCRIPTION OF FINDINGS: Background consists of posterior   dominant 7 Hz activity. This activity is symmetric and attenuates with eye   opening. With drowsiness, there is dropout of the background activity. Second stage sleep was not attained. Photic stimulation produces   symmetric posterior driving response. No seizures or interictal   hallmarks of epilepsy are noted. IMPRESSION: EEG is abnormal for generalized slowing. No seizures   were noted. Clinical correlation advised. Absence of seizures on this study does not exclude epilepsy.       Lyric Jang MD       / [facility]   D:  2017   01:42   T:  2017   06:13   Job #:  [id number]

## 2017-03-17 VITALS
HEART RATE: 72 BPM | TEMPERATURE: 97.5 F | SYSTOLIC BLOOD PRESSURE: 125 MMHG | OXYGEN SATURATION: 95 % | DIASTOLIC BLOOD PRESSURE: 66 MMHG | HEIGHT: 74 IN | BODY MASS INDEX: 20.1 KG/M2 | WEIGHT: 156.6 LBS | RESPIRATION RATE: 17 BRPM

## 2017-03-17 LAB
GLUCOSE BLD STRIP.AUTO-MCNC: 140 MG/DL (ref 65–100)
GLUCOSE BLD STRIP.AUTO-MCNC: 320 MG/DL (ref 65–100)
SERVICE CMNT-IMP: ABNORMAL
SERVICE CMNT-IMP: ABNORMAL

## 2017-03-17 PROCEDURE — 74011250636 HC RX REV CODE- 250/636: Performed by: INTERNAL MEDICINE

## 2017-03-17 PROCEDURE — 74011250637 HC RX REV CODE- 250/637: Performed by: INTERNAL MEDICINE

## 2017-03-17 PROCEDURE — 82962 GLUCOSE BLOOD TEST: CPT

## 2017-03-17 RX ORDER — METOPROLOL SUCCINATE 25 MG/1
12.5 TABLET, EXTENDED RELEASE ORAL DAILY
Qty: 30 TAB | Refills: 0 | Status: SHIPPED
Start: 2017-03-17 | End: 2017-03-17

## 2017-03-17 RX ORDER — QUINIDINE GLUCONATE 324 MG/1
324 TABLET, EXTENDED RELEASE ORAL 2 TIMES DAILY
Status: ON HOLD | COMMUNITY
End: 2017-04-11

## 2017-03-17 RX ORDER — METOPROLOL SUCCINATE 25 MG/1
12.5 TABLET, EXTENDED RELEASE ORAL DAILY
Qty: 30 TAB | Refills: 0 | Status: ON HOLD | OUTPATIENT
Start: 2017-03-17 | End: 2017-04-11

## 2017-03-17 RX ADMIN — ACETAMINOPHEN 650 MG: 325 TABLET, FILM COATED ORAL at 08:37

## 2017-03-17 RX ADMIN — ASPIRIN 81 MG CHEWABLE TABLET 81 MG: 81 TABLET CHEWABLE at 08:37

## 2017-03-17 RX ADMIN — AMIODARONE HYDROCHLORIDE 200 MG: 200 TABLET ORAL at 08:37

## 2017-03-17 RX ADMIN — MEXILETINE HYDROCHLORIDE 150 MG: 150 CAPSULE ORAL at 08:38

## 2017-03-17 RX ADMIN — LINAGLIPTIN 5 MG: 5 TABLET, FILM COATED ORAL at 08:38

## 2017-03-17 RX ADMIN — FUROSEMIDE 20 MG: 20 TABLET ORAL at 08:37

## 2017-03-17 RX ADMIN — INSULIN LISPRO 7 UNITS: 100 INJECTION, SOLUTION INTRAVENOUS; SUBCUTANEOUS at 11:53

## 2017-03-17 RX ADMIN — Medication 5 ML: at 06:35

## 2017-03-17 NOTE — DISCHARGE SUMMARY
Discharge Summary    Name: Queta Healy  [id number]  YOB: 1945 (Age: 66 y.o.)   Date of Admission: 3/13/2017  Date of Discharge: 3/17/2017  Attending Physician: No att. providers found    Discharge Diagnosis:   Acute systolic heart failure, EF 35-40%/cardiomyopathy s/p ablation/defib placement  Elevated Troponin  HTN  Hyperkalemia  LANDEN on CKD  Generalized weakness  T2DM  COPD    Consultants: Cardiology    Procedures: none    Brief Admission History/Reason for Admission Per Ethan Olmedo MD:    Queta Healy is a 66 y.o.  male w pmhx significant for acute systolic hf, cardiomyopathy s/p ablation/defib placement, follows with [facility], hx of COPD present to ED c/o of sudden onset of generalized weakness and fatigue started 1-2 days ago. Patient was recently discharged from [address] [place] [address] exactly 2 weeks ago, was recommended rehab, however he declined and went home with [facility]. States he was able to ambulate well up until 2 days ago when he suddenly developed sudden onset of weakness in LE where he was unable to ambulate normally. Denies any other focal neurological abnormalities. Patient also complained of visual hallucinations started 4 months ago progressively worsened this AM. Per brother at bedside, pt decided to call EMS because he was seeing a \"young girl who wouldn't leave him alone\". Denies any auditory hallucination. Other complaints including worsening of pitting edema, however denies any sob, cough, fever, chills, cp, changes bowel/bladder habits. Brief Hospital Course by Main Problems:   Acute systolic heart failure, EF 35-40%/cardiomyopathy s/p ablation/defib placement  Elevated Troponin  HTN  Pt had elevated troponin 0.10, trended down to 0.09. There was no ischemia seen on EKG. All antihypertensive medications resumed on admission, however bp was on the lower side. There is concerns with noncompliance of meds.   Lasix, amiodarone, quinidine, mexiletine, and low dose BB were added slowly with titration of dose. Lisinopril discontinued due to LANDEN and hyperkalemia. Patient to follow up with his cardiology in 3- days.     Hyperkalemia  K 5.7 on admission, resolved. ACEi discontinued on discharge.     Generalized weakness  Fall precautions per pt and family, this is sudden onset. [facility], however he declined. This is likely due to decondition in the setting of worsening of chronic medical issues. Head CT neg. Discharge to rehab.      Acute kidney injury, resolved.      T2DM  -recent A1C 7.9 from paper chart review from pt. Stable on SSI    COPD, stable on duonebs, prn nebs      Visual Hallucinations  -4 months hx. No hx of psychiatric issues. Debilitating per family. ? dementia  -denies SI/HI,no hallucination seen admission. Psych evaluated pt with recommendation of outpt f/u.     Discharge Exam:  Patient seen and examined by me on discharge day. Pertinent Findings:  Visit Vitals    /66 (BP 1 Location: Right arm, BP Patient Position: At rest)    Pulse 72    Temp 97.5 °F (36.4 °C)    Resp 17    Ht 6' 2\" (1.88 m)    Wt 71 kg (156 lb 9.6 oz)    SpO2 95%    BMI 20.11 kg/m2     Gen:    Not in distress  Chest: Clear lungs  CVS:   Regular rhythm. No edema  Abd:  Soft, not distended, not tender    Discharge/Recent Laboratory Results:  Recent Labs      03/15/17   0554   NA  143   K  3.8   CL  102   CO2  32   BUN  30*   GLU  158*   CA  8.4*   MG  1.8     No results for input(s): HGB, HCT, WBC, PLT, HGBEXT, HCTEXT, PLTEXT in the last 72 hours. Discharge Medication List as of 3/17/2017 12:32 PM      CONTINUE these medications which have CHANGED    Details   metoprolol succinate (TOPROL-XL) 25 mg XL tablet Take 0.5 Tabs by mouth daily. , Print, Disp-30 Tab, R-0         CONTINUE these medications which have NOT CHANGED    Details   quiNIDine gluconate SR (DURA-TABS) 324 mg SR tablet Take 324 mg by mouth daily. , Historical Med      metFORMIN (GLUCOPHAGE) 1,000 mg tablet Take 1,000 mg by mouth two (2) times daily (with meals). , Historical Med      omega 3-dha-epa-fish oil 360-1,200 mg cpDR Take 2,400 mg by mouth three (3) times daily. , Historical Med      GLUCOSAM/GLUC PEREZ/AT-UGC-U-GLUC (GLUCOSAMINE COMPLEX PO) Take 1 Tab by mouth daily. , Historical Med      amiodarone (CORDARONE) 200 mg tablet Take 200 mg by mouth daily. , Historical Med      ascorbic acid, vitamin C, 1,500 mg TbER Take 1,500 mg by mouth two (2) times a day., Historical Med      aspirin 81 mg chewable tablet Take 81 mg by mouth daily. , Historical Med      calcium-cholecalciferol, d3, (CALCIUM 600 + D) 600-125 mg-unit tab Take 1 Tab by mouth daily. , Historical Med      furosemide (LASIX) 20 mg tablet Take 20 mg by mouth daily. , Historical Med      glimepiride (AMARYL) 4 mg tablet Take 4 mg by mouth every morning., Historical Med      mexiletine (MEXITIL) 150 mg capsule Take 150 mg by mouth two (2) times a day., Historical Med      multivitamin (ONE A DAY) tablet Take 1 Tab by mouth daily. , Historical Med      SITagliptin (JANUVIA) 100 mg tablet Take 100 mg by mouth daily. , Historical Med      UBIQUINONE PO Take 300 mg by mouth daily. , Historical Med      vitamin E (AQUA GEMS) 400 unit capsule Take 400 Units by mouth daily. , Historical Med         STOP taking these medications       potassium chloride SR (KLOR-CON 10) 10 mEq tablet Comments:   Reason for Stopping:         ALBUTEROL IN Comments:   Reason for Stopping:         lisinopril (PRINIVIL, ZESTRIL) 2.5 mg tablet Comments:   Reason for Stopping:         magnesium oxide (MAG-OX) 400 mg tablet Comments:   Reason for Stopping:         OMEGA-3 FATTY ACIDS/FISH OIL (OMEGA 3 FISH OIL PO) Comments:   Reason for Stopping:         POTASSIUM CHLORIDE Comments:   Reason for Stopping:         salmeterol (SEREVENT) 50 mcg/dose diskus inhaler Comments:   Reason for Stopping: DISPOSITION:    Home with Family:    Home with HH/PT/OT/RN:    SNF/LTC: x   CANDE:    OTHER:          Follow up with:   PCP : Docia Areas, NP  Follow-up Information     Follow up With Details Comments [facility])  This will be your skilled nursing rehab facility.   [address]  [phone]    Charlotte Hanna MD On 3/20/2017 You have a cardiology appointment at 11:00 am on Monday March 20, 2017. [phone]  [address], NP   [address] [facility] [address]  [address] [facility]  [phone]      Buck Tate MD In 7 days  [address] [facility]  [phone]            Diet:  cardiac      Total time in minutes spent coordinating this discharge (includes going over instructions, follow-up, prescriptions, and preparing report for sign off to her PCP) :  35 minutes

## 2017-03-17 NOTE — ROUTINE PROCESS
Spoke with Lauren at [employer], [employer] [employer] and [facility]. All questions answered. Gave her my name and number her incase she has more questions later.

## 2017-03-17 NOTE — PROGRESS NOTES
Pt is ready for discharge today at 12:00pm. Pt is a CHF bundle pt, but his RRAT score is less than 21, so a palliative consult was not called. MSW intern was given pt's CHF bundle packet to be sent to [facility] with him when he discharges. MSW intern contacted Dr. Shira Hester office and was told that pt has a follow up appt scheduled for 3/20/17 at 11:00 am. MSW intern entered information on pt's AVS. MSW intern contacted [facility] and let them know that pt will be coming with a HF bundle packet and that he will need transportation to his cardiology appt on Monday. They stated that transportation can be provided for $68. MSW intern updated pt's nurse on the CHF information. MSW intern gave pt's nurse the call report number for [facility]. MSW intern placed paperwork for [facility] ambulance on pt's bedside nursing chart. Care Management Interventions  PCP Verified by CM: Yes  Palliative Care Consult (Criteria: CHF and RRAT>21): No  Reason for No Palliative Care Consult:  Other (see comment) (Pt's RRAT score was less than 21)  Mode of Transport at Discharge: ALS (Pt is unstable when walking and will need to be transporte by ambulance)  Transition of Care Consult (CM Consult): SNF ([place] [id number])  Physical Therapy Consult: Yes  Occupational Therapy Consult: Yes  Current Support Network: Lives Alone ([place], he has a few neightbors and an aide that come sometimes during the day to hel with things. )  Confirm Follow Up Transport: Self (Pt states that he normally drives himself to appts. )  Plan discussed with Pt/Family/Caregiver: Yes  Freedom of Choice Offered: Yes  Discharge Location  Discharge Placement: Skilled nursing facility ([place] [id number])    Ahsan Burnham, MSW Intern    Sharyle Bevel, MSW  [facility]  Ext [phone]

## 2017-03-31 ENCOUNTER — HOME HEALTH ADMISSION (OUTPATIENT)
Dept: HOME HEALTH SERVICES | Facility: HOME HEALTH | Age: 72
End: 2017-03-31

## 2017-04-05 ENCOUNTER — PATIENT OUTREACH (OUTPATIENT)
Dept: CASE MANAGEMENT | Age: 72
End: 2017-04-05

## 2017-04-05 ENCOUNTER — PATIENT OUTREACH (OUTPATIENT)
Dept: INTERNAL MEDICINE CLINIC | Age: 72
End: 2017-04-05

## 2017-04-05 NOTE — PROGRESS NOTES
Community Care Team Documentation for Patient in [facility]     Patient was admitted to [facility] from 3/13/17 to 3/17/17. Patient was discharged to [address] [place] [address] on 3/17/17 (date). Hospital Discharge diagnosis:  Congestive heart failure. PCP : Blake Posada NP    Information provided by SNF staff member,  Maxine Monaco, is as follows:    SNF Attending Provider:  Dr. Rodriguez Call  Anticipated discharge date from SNF:  Discharged on 4/3/17. Patient was not progressing with therapy. SNF discharge plan:  Patient was scheduled to follow up with his PCP on 4/5/17. Home Health was arranged with [facility]; however, his care was passed on to another un-named home health agency.

## 2017-04-05 NOTE — PROGRESS NOTES
Community Care Team Documentation for Patient in [facility]  Subsequent Follow up     Patient remains at [facility] ([facility]). See previous [facility] Team notes. PCP : Jeannie Hastings NP    Additional information provided today by SNF staff member, Raul Love, is as follows:    Patient requested to be discharged on 4/3/17, and was discharged same day. Originally [employer] was arranged. Patient was transitioned to [facility] due to possibility patient would noit attend PCP appointment. PCP appointment was scheduled for 4/5/17.

## 2017-04-05 NOTE — Clinical Note
We could not find out the [facility] name today on the call. Iron not aware. I wonder if he refused HH?

## 2017-04-05 NOTE — PROGRESS NOTES
Heart Failure Care Coordinator contacted the patient by telephone to perform CHF bundle Follow Up. Verified Name and  as identifiers.      Spoke to Olivier Hernandez regarding the following:  Have you been to an ER/Hospital Since Discharge From [facility]  or since we last spoke Denies    Patient left SNF earlier than expected. Concerns voiced by facility due to patients inability to participate in rehab. Pt was not ambulating well at facility. Transportation: Pt stated he has a follow up with PCP \"next week\", patient also stated he did not feel good, \" I'm having nausea and vomiting\" pt asked if he had Chest pain, or noticeable edema to abdomen, legs or ankles. Pt stated \"I do not have any CP,  My BP is stable, and no swelling that I can see\". CHF bundle coordinator attempted to talk patient into allowing coordinator to contact PCP to arrange an earlier apt date and time. Pt got very angry at request and stated \" I can hardly get around\"! \"I do not want any apt's any earlier than what I already have\"! Pt then stated \" I will be Just fine\". Pt wanted to disconnect phone call. Pt did agree to allow CHF Bundle coordinator to call back within a few days. Daily Weight: has not weighed as of yet    Patient Educated on the importance of daily weights and to call MD for a weight gain of 3 lbs. in one day and 5 lbs. in one week. Discussed how an increase in weight can usually be seen before HF symptoms occur. Patient verbalized understanding and agreeable. Patient encouraged to keep a daily log. Zone calendar given during hospital education is an excellent source. Signs/Symptoms:stated no edema or sob present at this time per patient.     Patient educated on Signs and Symptoms of CHF. These symptoms include but are not limited to Shortness of breath, increased fatigue, increased swelling around feet, ankles, legs or stomach, as well as difficulty breathing when lying down. Patient verbalized understanding.    Patient stated he does have a home health agency, he could not recall the name, believes it begins with a B. Did state a wheelchair was delivered to his home today. Patient reminded that the physicians are on call 24 hours a day / 7 days a week should the patient have questions or concerns.  Patient reminded to call 911 if situation is emergent or patient feels the situation is emergent.     No Medications Reconciled at this time

## 2017-04-06 ENCOUNTER — PATIENT OUTREACH (OUTPATIENT)
Dept: CASE MANAGEMENT | Age: 72
End: 2017-04-06

## 2017-04-06 NOTE — PROGRESS NOTES
Multiple calls to pt and family with no answer. Did leave voice messages with name, Identifying self as CHF coordinator.  and return phone number

## 2017-04-07 ENCOUNTER — APPOINTMENT (OUTPATIENT)
Dept: GENERAL RADIOLOGY | Age: 72
DRG: 291 | End: 2017-04-07
Attending: EMERGENCY MEDICINE
Payer: MEDICARE

## 2017-04-07 ENCOUNTER — PATIENT OUTREACH (OUTPATIENT)
Dept: CASE MANAGEMENT | Age: 72
End: 2017-04-07

## 2017-04-07 ENCOUNTER — HOSPITAL ENCOUNTER (INPATIENT)
Age: 72
LOS: 4 days | Discharge: HOME | DRG: 291 | End: 2017-04-11
Attending: EMERGENCY MEDICINE | Admitting: INTERNAL MEDICINE
Payer: MEDICARE

## 2017-04-07 ENCOUNTER — APPOINTMENT (OUTPATIENT)
Dept: ULTRASOUND IMAGING | Age: 72
DRG: 291 | End: 2017-04-07
Attending: INTERNAL MEDICINE
Payer: MEDICARE

## 2017-04-07 DIAGNOSIS — R73.9 HYPERGLYCEMIA: ICD-10-CM

## 2017-04-07 DIAGNOSIS — R45.1 AGITATION: ICD-10-CM

## 2017-04-07 DIAGNOSIS — J90 PLEURAL EFFUSION: ICD-10-CM

## 2017-04-07 DIAGNOSIS — E87.5 ACUTE HYPERKALEMIA: ICD-10-CM

## 2017-04-07 DIAGNOSIS — I50.23 SYSTOLIC CHF, ACUTE ON CHRONIC (HCC): ICD-10-CM

## 2017-04-07 DIAGNOSIS — N18.9 ACUTE ON CHRONIC RENAL INSUFFICIENCY: ICD-10-CM

## 2017-04-07 DIAGNOSIS — R06.02 SHORTNESS OF BREATH: ICD-10-CM

## 2017-04-07 DIAGNOSIS — R79.89 ELEVATED LACTIC ACID LEVEL: ICD-10-CM

## 2017-04-07 DIAGNOSIS — G93.40 ACUTE ENCEPHALOPATHY: Primary | ICD-10-CM

## 2017-04-07 DIAGNOSIS — Z71.89 ACP (ADVANCE CARE PLANNING): ICD-10-CM

## 2017-04-07 DIAGNOSIS — R09.02 HYPOXIA: ICD-10-CM

## 2017-04-07 DIAGNOSIS — N28.9 ACUTE ON CHRONIC RENAL INSUFFICIENCY: ICD-10-CM

## 2017-04-07 DIAGNOSIS — R06.89 ACUTE RESPIRATORY INSUFFICIENCY: ICD-10-CM

## 2017-04-07 PROBLEM — N17.9 AKI (ACUTE KIDNEY INJURY) (HCC): Status: ACTIVE | Noted: 2017-04-07

## 2017-04-07 PROBLEM — I47.20 V-TACH: Status: ACTIVE | Noted: 2017-04-07

## 2017-04-07 PROBLEM — Z95.810 AICD (AUTOMATIC CARDIOVERTER/DEFIBRILLATOR) PRESENT: Status: ACTIVE | Noted: 2017-04-07

## 2017-04-07 PROBLEM — J96.01 ACUTE RESPIRATORY FAILURE WITH HYPOXIA (HCC): Status: ACTIVE | Noted: 2017-04-07

## 2017-04-07 PROBLEM — I10 HTN (HYPERTENSION): Status: ACTIVE | Noted: 2017-04-07

## 2017-04-07 LAB
ALBUMIN SERPL BCP-MCNC: 2.8 G/DL (ref 3.5–5)
ALBUMIN SERPL BCP-MCNC: 3 G/DL (ref 3.5–5)
ALBUMIN/GLOB SERPL: 0.7 {RATIO} (ref 1.1–2.2)
ALBUMIN/GLOB SERPL: 0.8 {RATIO} (ref 1.1–2.2)
ALP SERPL-CCNC: 80 U/L (ref 45–117)
ALP SERPL-CCNC: 91 U/L (ref 45–117)
ALT SERPL-CCNC: 103 U/L (ref 12–78)
ALT SERPL-CCNC: 119 U/L (ref 12–78)
ANION GAP BLD CALC-SCNC: 10 MMOL/L (ref 5–15)
ANION GAP BLD CALC-SCNC: 11 MMOL/L (ref 5–15)
ANION GAP BLD CALC-SCNC: 7 MMOL/L (ref 5–15)
ARTERIAL PATENCY WRIST A: YES
AST SERPL W P-5'-P-CCNC: 109 U/L (ref 15–37)
AST SERPL W P-5'-P-CCNC: 75 U/L (ref 15–37)
BASE DEFICIT BLDA-SCNC: 0.6 MMOL/L
BASOPHILS # BLD AUTO: 0 K/UL (ref 0–0.1)
BASOPHILS # BLD: 0 % (ref 0–1)
BDY SITE: ABNORMAL
BILIRUB SERPL-MCNC: 0.4 MG/DL (ref 0.2–1)
BILIRUB SERPL-MCNC: 0.6 MG/DL (ref 0.2–1)
BNP SERPL-MCNC: ABNORMAL PG/ML (ref 0–125)
BUN SERPL-MCNC: 51 MG/DL (ref 6–20)
BUN SERPL-MCNC: 51 MG/DL (ref 6–20)
BUN SERPL-MCNC: 52 MG/DL (ref 6–20)
BUN/CREAT SERPL: 28 (ref 12–20)
BUN/CREAT SERPL: 31 (ref 12–20)
BUN/CREAT SERPL: 32 (ref 12–20)
CALCIUM SERPL-MCNC: 8.6 MG/DL (ref 8.5–10.1)
CALCIUM SERPL-MCNC: 8.6 MG/DL (ref 8.5–10.1)
CALCIUM SERPL-MCNC: 9.2 MG/DL (ref 8.5–10.1)
CHLORIDE SERPL-SCNC: 101 MMOL/L (ref 97–108)
CHLORIDE SERPL-SCNC: 102 MMOL/L (ref 97–108)
CHLORIDE SERPL-SCNC: 104 MMOL/L (ref 97–108)
CK MB CFR SERPL CALC: 2.3 % (ref 0–2.5)
CK MB SERPL-MCNC: 3.2 NG/ML (ref 5–25)
CK SERPL-CCNC: 141 U/L (ref 39–308)
CO2 SERPL-SCNC: 24 MMOL/L (ref 21–32)
CO2 SERPL-SCNC: 27 MMOL/L (ref 21–32)
CO2 SERPL-SCNC: 30 MMOL/L (ref 21–32)
CREAT SERPL-MCNC: 1.61 MG/DL (ref 0.7–1.3)
CREAT SERPL-MCNC: 1.62 MG/DL (ref 0.7–1.3)
CREAT SERPL-MCNC: 1.82 MG/DL (ref 0.7–1.3)
EOSINOPHIL # BLD: 0 K/UL (ref 0–0.4)
EOSINOPHIL NFR BLD: 0 % (ref 0–7)
ERYTHROCYTE [DISTWIDTH] IN BLOOD BY AUTOMATED COUNT: 15.1 % (ref 11.5–14.5)
GLOBULIN SER CALC-MCNC: 3.3 G/DL (ref 2–4)
GLOBULIN SER CALC-MCNC: 4.1 G/DL (ref 2–4)
GLUCOSE BLD STRIP.AUTO-MCNC: 152 MG/DL (ref 65–100)
GLUCOSE SERPL-MCNC: 184 MG/DL (ref 65–100)
GLUCOSE SERPL-MCNC: 187 MG/DL (ref 65–100)
GLUCOSE SERPL-MCNC: 208 MG/DL (ref 65–100)
HCO3 BLDA-SCNC: 25 MMOL/L (ref 22–26)
HCT VFR BLD AUTO: 38.1 % (ref 36.6–50.3)
HGB BLD-MCNC: 12 G/DL (ref 12.1–17)
LACTATE SERPL-SCNC: 4.4 MMOL/L (ref 0.4–2)
LACTATE SERPL-SCNC: 7.5 MMOL/L (ref 0.4–2)
LYMPHOCYTES # BLD AUTO: 8 % (ref 12–49)
LYMPHOCYTES # BLD: 0.7 K/UL (ref 0.8–3.5)
MCH RBC QN AUTO: 30.8 PG (ref 26–34)
MCHC RBC AUTO-ENTMCNC: 31.5 G/DL (ref 30–36.5)
MCV RBC AUTO: 97.9 FL (ref 80–99)
MONOCYTES # BLD: 0.8 K/UL (ref 0–1)
MONOCYTES NFR BLD AUTO: 10 % (ref 5–13)
NEUTS SEG # BLD: 6.8 K/UL (ref 1.8–8)
NEUTS SEG NFR BLD AUTO: 82 % (ref 32–75)
PCO2 BLDA: 44 MMHG (ref 35–45)
PH BLDA: 7.37 [PH] (ref 7.35–7.45)
PLATELET # BLD AUTO: 229 K/UL (ref 150–400)
PO2 BLDA: 105 MMHG (ref 80–100)
POTASSIUM SERPL-SCNC: 5.9 MMOL/L (ref 3.5–5.1)
POTASSIUM SERPL-SCNC: 6.9 MMOL/L (ref 3.5–5.1)
POTASSIUM SERPL-SCNC: ABNORMAL MMOL/L (ref 3.5–5.1)
PROT SERPL-MCNC: 6.1 G/DL (ref 6.4–8.2)
PROT SERPL-MCNC: 7.1 G/DL (ref 6.4–8.2)
RBC # BLD AUTO: 3.89 M/UL (ref 4.1–5.7)
RBC MORPH BLD: ABNORMAL
SAO2 % BLD: 98 % (ref 92–97)
SAO2% DEVICE SAO2% SENSOR NAME: ABNORMAL
SERVICE CMNT-IMP: ABNORMAL
SODIUM SERPL-SCNC: 137 MMOL/L (ref 136–145)
SODIUM SERPL-SCNC: 138 MMOL/L (ref 136–145)
SODIUM SERPL-SCNC: 141 MMOL/L (ref 136–145)
SPECIMEN SITE: ABNORMAL
TROPONIN I SERPL-MCNC: <0.04 NG/ML
VENTILATION MODE VENT: ABNORMAL
WBC # BLD AUTO: 8.3 K/UL (ref 4.1–11.1)

## 2017-04-07 PROCEDURE — 94762 N-INVAS EAR/PLS OXIMTRY CONT: CPT

## 2017-04-07 PROCEDURE — 74011000250 HC RX REV CODE- 250: Performed by: EMERGENCY MEDICINE

## 2017-04-07 PROCEDURE — 99284 EMERGENCY DEPT VISIT MOD MDM: CPT

## 2017-04-07 PROCEDURE — 71010 XR CHEST PORT: CPT

## 2017-04-07 PROCEDURE — 36600 WITHDRAWAL OF ARTERIAL BLOOD: CPT | Performed by: EMERGENCY MEDICINE

## 2017-04-07 PROCEDURE — 93306 TTE W/DOPPLER COMPLETE: CPT

## 2017-04-07 PROCEDURE — 80053 COMPREHEN METABOLIC PANEL: CPT | Performed by: EMERGENCY MEDICINE

## 2017-04-07 PROCEDURE — 84484 ASSAY OF TROPONIN QUANT: CPT | Performed by: EMERGENCY MEDICINE

## 2017-04-07 PROCEDURE — C1751 CATH, INF, PER/CENT/MIDLINE: HCPCS

## 2017-04-07 PROCEDURE — 65660000000 HC RM CCU STEPDOWN

## 2017-04-07 PROCEDURE — 74011250636 HC RX REV CODE- 250/636: Performed by: INTERNAL MEDICINE

## 2017-04-07 PROCEDURE — 77030013140 HC MSK NEB VYRM -A

## 2017-04-07 PROCEDURE — 77030005514 HC CATH URETH FOL14 BARD -A

## 2017-04-07 PROCEDURE — 93005 ELECTROCARDIOGRAM TRACING: CPT

## 2017-04-07 PROCEDURE — 83880 ASSAY OF NATRIURETIC PEPTIDE: CPT | Performed by: EMERGENCY MEDICINE

## 2017-04-07 PROCEDURE — 36415 COLL VENOUS BLD VENIPUNCTURE: CPT | Performed by: EMERGENCY MEDICINE

## 2017-04-07 PROCEDURE — 74011250637 HC RX REV CODE- 250/637: Performed by: EMERGENCY MEDICINE

## 2017-04-07 PROCEDURE — 74011636637 HC RX REV CODE- 636/637: Performed by: EMERGENCY MEDICINE

## 2017-04-07 PROCEDURE — 83605 ASSAY OF LACTIC ACID: CPT | Performed by: EMERGENCY MEDICINE

## 2017-04-07 PROCEDURE — 80048 BASIC METABOLIC PNL TOTAL CA: CPT | Performed by: INTERNAL MEDICINE

## 2017-04-07 PROCEDURE — 82962 GLUCOSE BLOOD TEST: CPT

## 2017-04-07 PROCEDURE — 51702 INSERT TEMP BLADDER CATH: CPT

## 2017-04-07 PROCEDURE — 82803 BLOOD GASES ANY COMBINATION: CPT | Performed by: EMERGENCY MEDICINE

## 2017-04-07 PROCEDURE — 82550 ASSAY OF CK (CPK): CPT | Performed by: EMERGENCY MEDICINE

## 2017-04-07 PROCEDURE — 75810000137 HC PLCMT CENT VENOUS CATH

## 2017-04-07 PROCEDURE — 74011250637 HC RX REV CODE- 250/637: Performed by: INTERNAL MEDICINE

## 2017-04-07 PROCEDURE — 94640 AIRWAY INHALATION TREATMENT: CPT

## 2017-04-07 PROCEDURE — 74011000258 HC RX REV CODE- 258: Performed by: INTERNAL MEDICINE

## 2017-04-07 PROCEDURE — 85025 COMPLETE CBC W/AUTO DIFF WBC: CPT | Performed by: EMERGENCY MEDICINE

## 2017-04-07 RX ORDER — DOBUTAMINE HYDROCHLORIDE 200 MG/100ML
2.5 INJECTION INTRAVENOUS
Status: DISCONTINUED | OUTPATIENT
Start: 2017-04-07 | End: 2017-04-09

## 2017-04-07 RX ORDER — SODIUM POLYSTYRENE SULFONATE 15 G/60ML
30 SUSPENSION ORAL; RECTAL
Status: COMPLETED | OUTPATIENT
Start: 2017-04-07 | End: 2017-04-07

## 2017-04-07 RX ORDER — SODIUM POLYSTYRENE SULFONATE 15 G/60ML
15 SUSPENSION ORAL; RECTAL
Status: COMPLETED | OUTPATIENT
Start: 2017-04-07 | End: 2017-04-07

## 2017-04-07 RX ORDER — ALBUTEROL SULFATE 0.83 MG/ML
5 SOLUTION RESPIRATORY (INHALATION)
Status: COMPLETED | OUTPATIENT
Start: 2017-04-07 | End: 2017-04-07

## 2017-04-07 RX ORDER — CALCIUM GLUCONATE 94 MG/ML
1 INJECTION, SOLUTION INTRAVENOUS ONCE
Status: DISCONTINUED | OUTPATIENT
Start: 2017-04-07 | End: 2017-04-07 | Stop reason: SDUPTHER

## 2017-04-07 RX ORDER — DEXTROSE 50 % IN WATER (D50W) INTRAVENOUS SYRINGE
50
Status: COMPLETED | OUTPATIENT
Start: 2017-04-07 | End: 2017-04-07

## 2017-04-07 RX ADMIN — SODIUM POLYSTYRENE SULFONATE 30 G: 15 SUSPENSION ORAL; RECTAL at 19:19

## 2017-04-07 RX ADMIN — DEXTROSE MONOHYDRATE 25 G: 25 INJECTION, SOLUTION INTRAVENOUS at 21:18

## 2017-04-07 RX ADMIN — SODIUM POLYSTYRENE SULFONATE 15 G: 15 SUSPENSION ORAL; RECTAL at 19:19

## 2017-04-07 RX ADMIN — ALBUTEROL SULFATE 5 MG: 2.5 SOLUTION RESPIRATORY (INHALATION) at 19:30

## 2017-04-07 RX ADMIN — CALCIUM GLUCONATE 1 G: 94 INJECTION, SOLUTION INTRAVENOUS at 19:56

## 2017-04-07 RX ADMIN — SODIUM POLYSTYRENE SULFONATE 15 G: 15 SUSPENSION ORAL; RECTAL at 21:15

## 2017-04-07 RX ADMIN — HUMAN INSULIN 10 UNITS: 100 INJECTION, SOLUTION SUBCUTANEOUS at 18:37

## 2017-04-07 RX ADMIN — DOBUTAMINE IN DEXTROSE 5 MCG/KG/MIN: 200 INJECTION, SOLUTION INTRAVENOUS at 22:39

## 2017-04-07 NOTE — H&P
Hospitalist Admission Note    NAME: Tracie Stiles   :     MRN:  [id number]     Date/Time:  2017 7:47 PM    Patient PCP: Kennedy Underwood NP  ________________________________________________________________________    My assessment of this patient's clinical condition and my plan of care is as follows. Assessment / Plan:  Acute respiratory failure with hypoxia POA  Due to Systolic CHF, acute on chronic (EF 20-25%)  Admit to PCU  Unable to give IV Diuretics due to BP issue (was hypotensive PTA) and currently BP borderline  IV dobutamine drip as per discussion with cardiology Dr Dixon Fuller  Cardiology already seen the patient in ED, awaiting formal consult  2D echo done in ED  Central line to be placed by ED  CVP monitoring  CXR with left pleural effusion  O2 support  Check ABGs as altered mental status  PRN BiPAP    LANDEN with baseline CKD3  Hyperkalemia  Likely due to decompensated CHF  Given Kayexalate, Insulin/Dextrose, will also give Calcium Gluconate  I have spoken to oncall nephrology about the case who will be seeing pt on consult  Serial BMP Q4H to make sure trending down    Acute Encephalopathy POA with possible baseline dementia   Hallucination for months  Check ABGs to make sure no CO2 retention  Consider CT head is ABGs negative    HTN (hypertension)   Holding BP meds due to BP issue    V-tach   Continue amiodarone    AICD (automatic cardioverter/defibrillator) present     Code Status: Full  Surrogate Decision Maker: Brother Jase Hernandez    DVT Prophylaxis: SQ heparin        Subjective:   CHIEF COMPLAINT: Found Slumped over the chair    HISTORY OF PRESENT ILLNESS:     Richie Noguera is a 66 y.o.  male who presents to ED because [facility] as Found Slumped over the chair. Per brother pt had AICD placement few weeks ago since then patient is been feeling sick and shortness of breath.  Brother also reported memory disturbance and visual hallucinations for past several months. Pt is currently confuse and unable to provide any meaningful history so history is limited. In ED pt noted to have hyperkalemia, Hypoxia, Cr worse then his baseline and CXR with unilateral pleural effusion. We were asked to admit for work up and evaluation of the above problems. Past Medical History:   Diagnosis Date    Asthma     COPD    CAD (coronary artery disease)     AICD implanted March 2017    [facility]     Heart failure ([facility]         Past Surgical History:   Procedure Laterality Date    HX PACEMAKER         Social History   Substance Use Topics    Smoking status: Former Smoker    Smokeless tobacco: Not on file    Alcohol use No        Family history: unable to obtain due to altered mental status. No Known Allergies     Prior to Admission medications    Medication Sig Start Date End Date Taking? Authorizing Provider   quiNIDine gluconate SR (DURA-TABS) 324 mg SR tablet Take 324 mg by mouth daily. Historical Provider   metoprolol succinate (TOPROL-XL) 25 mg XL tablet Take 0.5 Tabs by mouth daily. 3/17/17   Alana Benito MD   metFORMIN (GLUCOPHAGE) 1,000 mg tablet Take 1,000 mg by mouth two (2) times daily (with meals). Historical Provider   omega 3-dha-epa-fish oil 360-1,200 mg cpDR Take 2,400 mg by mouth three (3) times daily. Historical Provider   GLUCOSAM/GLUC PEREZ/GM-FMB-H-GLUC (GLUCOSAMINE COMPLEX PO) Take 1 Tab by mouth daily. Historical Provider   amiodarone (CORDARONE) 200 mg tablet Take 200 mg by mouth daily. Ashlee Houser MD   ascorbic acid, vitamin C, 1,500 mg TbER Take 1,500 mg by mouth two (2) times a day. Ashlee Houser MD   aspirin 81 mg chewable tablet Take 81 mg by mouth daily. Ashlee Houser MD   calcium-cholecalciferol, d3, (CALCIUM 600 + D) 600-125 mg-unit tab Take 1 Tab by mouth daily. Ashlee Houser MD   furosemide (LASIX) 20 mg tablet Take 20 mg by mouth daily.     Ashlee Houser MD   glimepiride (AMARYL) 4 mg tablet Take 4 mg by mouth every morning. Ashlee Houser MD   mexiletine (MEXITIL) 150 mg capsule Take 150 mg by mouth two (2) times a day. Ashlee Houser MD   multivitamin (ONE A DAY) tablet Take 1 Tab by mouth daily. Ashlee Houser MD   SITagliptin (JANUVIA) 100 mg tablet Take 100 mg by mouth daily. Ashlee Houser MD   UBIQUINONE PO Take 300 mg by mouth daily. Ashlee Houser MD   vitamin E (AQUA GEMS) 400 unit capsule Take 400 Units by mouth daily. Ashlee Houser MD       REVIEW OF SYSTEMS:     I am not able to complete the review of systems because: The patient is intubated and sedated   y The patient has altered mental status due to his acute medical problems    The patient has baseline aphasia from prior stroke(s)    The patient has baseline dementia and is not reliable historian    The patient is in acute medical distress and unable to provide information           Objective:   VITALS:    Visit Vitals    /62 (BP 1 Location: Left arm, BP Patient Position: At rest)    Pulse 64    Temp 96.9 °F (36.1 °C)    Resp 18    Ht 6' 3\" (1.905 m)    Wt 72.6 kg (160 lb)    BMI 20 kg/m2       PHYSICAL EXAM:    General:    Alert, cooperative, no distress, appears stated age. HEENT: Atraumatic, anicteric sclerae, pink conjunctivae     No oral ulcers, mucosa moist, throat clear, dentition fair  Neck:  Supple, symmetrical,  thyroid: non tender  Lungs:   crackles. No Wheezing or Rhonchi. No rales. Chest wall:  No tenderness  No Accessory muscle use. Heart:   Regular  rhythm,  No  murmur   No edema  Abdomen:   Soft, non-tender. Not distended. Bowel sounds normal  Extremities: No cyanosis. No clubbing      Capillary refill normal,  Radial pulse 2+  Skin:     Not pale. Not Jaundiced  No rashes   Psych:  Poor insight. Not anxious or agitated. Neurologic: EOMs intact. No facial asymmetry. No aphasia or slurred speech. Symmetrical strength, Sensation grossly intact. Alert and oriented X 2. _______________________________________________________________________  Care Plan discussed with:    Comments   Patient y    Family  y Brother at bedside   RN y    Care Manager                    Consultant:  nelia ED physician   _______________________________________________________________________  Expected  Disposition:   Home with Family    HH/PT/OT/RN y   SNF/LTC    CANDE    ________________________________________________________________________  TOTAL TIME: 72 Minutes    Critical Care Provided     Minutes non procedure based      Comments    y Reviewed previous records   >50% of visit spent in counseling and coordination of care y Discussion with patient and family and questions answered       ________________________________________________________________________  Signed: Kaiden Thorne MD    Procedures: see electronic medical records for all procedures/Xrays and details which were not copied into this note but were reviewed prior to creation of Plan. LAB DATA REVIEWED:    Recent Results (from the past 24 hour(s))   EKG, 12 LEAD, INITIAL    Collection Time: 04/07/17  4:08 PM   Result Value Ref Range    Ventricular Rate 61 BPM    Atrial Rate 61 BPM    P-R Interval 160 ms    QRS Duration 18 ms    Q-T Interval 528 ms    QTC Calculation (Bezet) 531 ms    Calculated P Axis 92 degrees    Calculated R Axis 0 degrees    Calculated T Axis 93 degrees    Diagnosis       Atrial-paced rhythm with occasional ventricular-paced complexes  Indeterminate axis  Pulmonary disease pattern  ST elevation, consider anterolateral injury or acute infarct  ST elevation, consider inferior injury or acute infarct  Prolonged QT  ** ** ACUTE MI / STEMI ** **  Consider right ventricular involvement in acute inferior infarct  Abnormal ECG  When compared with ECG of 13-MAR-2017 15:24,  Vent.  rate has decreased BY   6 BPM     CBC WITH AUTOMATED DIFF    Collection Time: 04/07/17  4:35 PM   Result Value Ref Range    WBC 8.3 4.1 - 11.1 K/uL RBC 3.89 (L) 4.10 - 5.70 M/uL    HGB 12.0 (L) 12.1 - 17.0 g/dL    HCT 38.1 36.6 - 50.3 %    MCV 97.9 80.0 - 99.0 FL    MCH 30.8 26.0 - 34.0 PG    MCHC 31.5 30.0 - 36.5 g/dL    RDW 15.1 (H) 11.5 - 14.5 %    PLATELET 682 222 - 853 K/uL    NEUTROPHILS 82 (H) 32 - 75 %    LYMPHOCYTES 8 (L) 12 - 49 %    MONOCYTES 10 5 - 13 %    EOSINOPHILS 0 0 - 7 %    BASOPHILS 0 0 - 1 %    ABS. NEUTROPHILS 6.8 1.8 - 8.0 K/UL    ABS. LYMPHOCYTES 0.7 (L) 0.8 - 3.5 K/UL    ABS. MONOCYTES 0.8 0.0 - 1.0 K/UL    ABS. EOSINOPHILS 0.0 0.0 - 0.4 K/UL    ABS. BASOPHILS 0.0 0.0 - 0.1 K/UL    RBC COMMENTS MACROCYTOSIS  1+       METABOLIC PANEL, COMPREHENSIVE    Collection Time: 04/07/17  4:35 PM   Result Value Ref Range    Sodium 137 136 - 145 mmol/L    Potassium HEMOLYZED,RECOLLECT REQUESTED 3.5 - 5.1 mmol/L    Chloride 102 97 - 108 mmol/L    CO2 24 21 - 32 mmol/L    Anion gap 11 5 - 15 mmol/L    Glucose 187 (H) 65 - 100 mg/dL    BUN 51 (H) 6 - 20 MG/DL    Creatinine 1.62 (H) 0.70 - 1.30 MG/DL    BUN/Creatinine ratio 31 (H) 12 - 20      GFR est AA 51 (L) >60 ml/min/1.73m2    GFR est non-AA 42 (L) >60 ml/min/1.73m2    Calcium 8.6 8.5 - 10.1 MG/DL    Bilirubin, total 0.6 0.2 - 1.0 MG/DL    ALT (SGPT) 119 (H) 12 - 78 U/L    AST (SGOT) 109 (H) 15 - 37 U/L    Alk.  phosphatase 91 45 - 117 U/L    Protein, total 7.1 6.4 - 8.2 g/dL    Albumin 3.0 (L) 3.5 - 5.0 g/dL    Globulin 4.1 (H) 2.0 - 4.0 g/dL    A-G Ratio 0.7 (L) 1.1 - 2.2     CK W/ CKMB & INDEX    Collection Time: 04/07/17  4:35 PM   Result Value Ref Range     39 - 308 U/L    CK - MB 3.2 <3.6 NG/ML    CK-MB Index 2.3 0 - 2.5     TROPONIN I    Collection Time: 04/07/17  4:35 PM   Result Value Ref Range    Troponin-I, Qt. <0.04 <0.05 ng/mL   PRO-BNP    Collection Time: 04/07/17  4:35 PM   Result Value Ref Range    NT pro-BNP 35953 (H) 0 - 125 PG/ML   LACTIC ACID, PLASMA    Collection Time: 04/07/17  4:35 PM   Result Value Ref Range    Lactic acid 4.4 (HH) 0.4 - 2.0 MMOL/L   METABOLIC PANEL, COMPREHENSIVE    Collection Time: 04/07/17  5:50 PM   Result Value Ref Range    Sodium 138 136 - 145 mmol/L    Potassium 6.9 (HH) 3.5 - 5.1 mmol/L    Chloride 101 97 - 108 mmol/L    CO2 30 21 - 32 mmol/L    Anion gap 7 5 - 15 mmol/L    Glucose 184 (H) 65 - 100 mg/dL    BUN 52 (H) 6 - 20 MG/DL    Creatinine 1.61 (H) 0.70 - 1.30 MG/DL    BUN/Creatinine ratio 32 (H) 12 - 20      GFR est AA 51 (L) >60 ml/min/1.73m2    GFR est non-AA 42 (L) >60 ml/min/1.73m2    Calcium 8.6 8.5 - 10.1 MG/DL    Bilirubin, total 0.4 0.2 - 1.0 MG/DL    ALT (SGPT) 103 (H) 12 - 78 U/L    AST (SGOT) 75 (H) 15 - 37 U/L    Alk.  phosphatase 80 45 - 117 U/L    Protein, total 6.1 (L) 6.4 - 8.2 g/dL    Albumin 2.8 (L) 3.5 - 5.0 g/dL    Globulin 3.3 2.0 - 4.0 g/dL    A-G Ratio 0.8 (L) 1.1 - 2.2

## 2017-04-07 NOTE — IP AVS SNAPSHOT
[address] [facility] 
[phone] Patient: David Silver MRN: [id number] AYI:5/90/[id number] You are allergic to the following No active allergies Recent Documentation Height Weight BMI Smoking Status 1.905 m 74 kg 20.39 kg/m2 Former Smoker Emergency Contacts Name Discharge Info Relation Home Work Mobile Opal Christopher [24] [phone] Norman Avina  Other Relative [6] [phone] About your hospitalization You were admitted on:  April 7, 2017 You last received care in the:  [facility] You were discharged on:  April 11, 2017 Unit phone number:  [phone] Why you were hospitalized Your primary diagnosis was:  Not on File Your diagnoses also included:  Acute Respiratory Failure With Hypoxia (Hcc), Systolic Chf, Acute On Chronic (Hcc), Audi (Acute Kidney Injury) (Hcc), Htn (Hypertension), V-Tach (Hcc), Aicd (Automatic Cardioverter/Defibrillator) Present, Agitation, Shortness Of Breath, Acp (Advance Care Planning), Debility Providers Seen During Your Hospitalizations Provider Role Specialty Primary office phone Nancy Flores MD Attending Provider Emergency Medicine [phone] Sylvia Perez MD Attending Provider Internal Medicine [phone] Your Primary Care Physician (PCP) Primary Care Physician Office Phone Office Fax Abhi Robleroyamila [phone] Follow-up Information Follow up With Details Comments Contact Info Cat Wallace MD Schedule an appointment as soon as possible for a visit in 2 weeks  [address] [place] [address] [phone] [address] [facility]  you are being discharged here for transition of care [facility] [address] 
[phone] Zulema Rodriguez, NP   [address] [facility] [address] [place] [address] 
[phone] Current Discharge Medication List  
  
CONTINUE these medications which have CHANGED Dose & Instructions Dispensing Information Comments Morning Noon Evening Bedtime  
 quiNIDine gluconate  mg SR tablet Commonly known as:  DURA-TABS What changed:  how much to take Your last dose was: Your next dose is:    
   
   
 Dose:  648 mg Take 2 Tabs by mouth two (2) times a day. Quantity:  60 Tab Refills:  0 CONTINUE these medications which have NOT CHANGED Dose & Instructions Dispensing Information Comments Morning Noon Evening Bedtime  
 amiodarone 200 mg tablet Commonly known as:  CORDARONE Your last dose was: Your next dose is:    
   
   
 Dose:  200 mg Take 200 mg by mouth daily. Refills:  0  
     
   
   
   
  
 ascorbic acid (vitamin C) 1,500 mg Tber Your last dose was: Your next dose is:    
   
   
 Dose:  1500 mg Take 1,500 mg by mouth two (2) times a day. Refills:  0  
     
   
   
   
  
 aspirin 81 mg chewable tablet Your last dose was: Your next dose is:    
   
   
 Dose:  81 mg Take 81 mg by mouth daily. Refills:  0  
     
   
   
   
  
 CALCIUM 600 + D 600-125 mg-unit Tab Generic drug:  calcium-cholecalciferol (d3) Your last dose was: Your next dose is:    
   
   
 Dose:  1 Tab Take 1 Tab by mouth daily. Refills:  0  
     
   
   
   
  
 glimepiride 4 mg tablet Commonly known as:  AMARYL Your last dose was: Your next dose is:    
   
   
 Dose:  4 mg Take 4 mg by mouth every morning. Refills:  0 GLUCOSAMINE COMPLEX PO Your last dose was: Your next dose is:    
   
   
 Dose:  1 Tab Take 1 Tab by mouth daily. Refills:  0 JANUVIA 100 mg tablet Generic drug:  SITagliptin Your last dose was: Your next dose is:    
   
   
 Dose:  100 mg Take 100 mg by mouth daily. Refills:  0  
     
   
   
   
  
 LASIX 20 mg tablet Generic drug:  furosemide Your last dose was: Your next dose is:    
   
   
 Dose:  20 mg Take 20 mg by mouth daily. Refills:  0  
     
   
   
   
  
 metFORMIN 1,000 mg tablet Commonly known as:  GLUCOPHAGE Your last dose was: Your next dose is:    
   
   
 Dose:  1000 mg Take 1,000 mg by mouth two (2) times daily (with meals). Refills:  0  
     
   
   
   
  
 metoprolol succinate 25 mg XL tablet Commonly known as:  TOPROL-XL Your last dose was: Your next dose is:    
   
   
 Dose:  12.5 mg Take 0.5 Tabs by mouth daily. Quantity:  30 Tab Refills:  0  
     
   
   
   
  
 mexiletine 150 mg capsule Commonly known as:  MEXITIL Your last dose was: Your next dose is:    
   
   
 Dose:  150 mg Take 150 mg by mouth two (2) times a day. Refills:  0  
     
   
   
   
  
 multivitamin tablet Commonly known as:  ONE A DAY Your last dose was: Your next dose is:    
   
   
 Dose:  1 Tab Take 1 Tab by mouth daily. Refills:  0  
     
   
   
   
  
 omega 3-dha-epa-fish oil 360-1,200 mg Cpdr  
   
Your last dose was: Your next dose is:    
   
   
 Dose:  2400 mg Take 2,400 mg by mouth three (3) times daily. Refills:  0  
     
   
   
   
  
 UBIQUINONE PO Your last dose was: Your next dose is:    
   
   
 Dose:  300 mg Take 300 mg by mouth daily. Refills:  0  
     
   
   
   
  
 vitamin E 400 unit capsule Commonly known as:  [address] Your last dose was: Your next dose is:    
   
   
 Dose:  400 Units Take 400 Units by mouth daily. Refills:  0 Where to Get Your Medications Information on where to get these meds will be given to you by the nurse or doctor. ! Ask your nurse or doctor about these medications  
  metoprolol succinate 25 mg XL tablet  
 quiNIDine gluconate  mg SR tablet Discharge Instructions MyChart Activation Thank you for requesting access to [employer]. Please follow the instructions below to securely access and download your online medical record. [employer]bly allows you to send messages to your doctor, view your test results, renew your prescriptions, schedule appointments, and more. How Do I Sign Up? 1. In your internet browser, go to [email or web address] 
2. Click on the First Time User? Click Here link in the Sign In box. You will be redirect to the New Member Sign Up page. 3. Enter your [employer]bly Access Code exactly as it appears below. You will not need to use this code after youve completed the sign-up process. If you do not sign up before the expiration date, you must request a new code. Superbly Access Code: [email or web address] Expires: 2017  3:41 PM (This is the date your [employer]bly access code will ) 4. Enter the last four digits of your Social Security Number (xxxx) and Date of Birth (mm/dd/yyyy) as indicated and click Submit. You will be taken to the next sign-up page. 5. Create a Superbly ID. This will be your Superbly login ID and cannot be changed, so think of one that is secure and easy to remember. 6. Create a Superbly password. You can change your password at any time. 7. Enter your Password Reset Question and Answer. This can be used at a later time if you forget your password. 8. Enter your e-mail address. You will receive e-mail notification when new information is available in [address] 9. Click Sign Up. You can now view and download portions of your medical record. 10. Click the Download Summary menu link to download a portable copy of your medical information. Additional Information If you have questions, please visit the Frequently Asked Questions section of the [employer] website at https://[email or web address]. "[employer]GVISP 1t/. Remember, [employer] is NOT to be used for urgent needs. For medical emergencies, dial 911. Discharge Instructions Attachments/References HEART FAILURE: AVOIDING TRIGGERS (ENGLISH) HEART FAILURE: LIMITING SODIUM AND FLUIDS (ENGLISH) HEART FAILURE ZONES: GENERAL INFO (ENGLISH) Discharge Orders None [employer] Announcement We are excited to announce that we are making your provider's discharge notes available to you in [employer]. You will see these notes when they are completed and signed by the physician that discharged you from your recent hospital stay. If you have any questions or concerns about any information you see in [employer], please call the Health Information Department where you were seen or reach out to your Primary Care Provider for more information about your plan of care. Introducing [facility]! Karina Vivar introduces [employer] patient portal. Now you can access parts of your medical record, email your doctor's office, and request medication refills online. 1. In your internet browser, go to [email or web address]. "[employer]GVISP 1t 2. Click on the First Time User? Click Here link in the Sign In box. You will see the New Member Sign Up page. 3. Enter your [employer] Access Code exactly as it appears below. You will not need to use this code after youve completed the sign-up process. If you do not sign up before the expiration date, you must request a new code. · [employer] Access Code: [email or web address] Expires: 6/11/2017  3:41 PM 
 
4. Enter the last four digits of your Social Security Number (xxxx) and Date of Birth (mm/dd/yyyy) as indicated and click Submit. You will be taken to the next sign-up page. 5. Create a [employer] ID.  This will be your [employer] login ID and cannot be changed, so think of one that is secure and easy to remember. 6. Create a [employer] password. You can change your password at any time. 7. Enter your Password Reset Question and Answer. This can be used at a later time if you forget your password. 8. Enter your e-mail address. You will receive e-mail notification when new information is available in [address] 9. Click Sign Up. You can now view and download portions of your medical record. 10. Click the Download Summary menu link to download a portable copy of your medical information. If you have questions, please visit the Frequently Asked Questions section of the [employer] website. Remember, [employer] is NOT to be used for urgent needs. For medical emergencies, dial 911. Now available from your iPhone and Android! General Information Please provide this summary of care documentation to your next provider. Patient Signature:  ____________________________________________________________ Date:  ____________________________________________________________  
  
Corinna Ansari Provider Signature:  ____________________________________________________________ Date:  ____________________________________________________________ More Information Avoiding Triggers With Heart Failure: Care Instructions Your Care Instructions Triggers are anything that make your heart failure flare up. A flare-up is also called \"sudden heart failure\" or \"acute heart failure. \" When you have a flare-up, fluid builds up in your lungs, and you have problems breathing. You might need to go to the hospital. By watching for changes in your condition and avoiding triggers, you can prevent heart failure flare-ups. Follow-up care is a key part of your treatment and safety. Be sure to make and go to all appointments, and call your doctor if you are having problems.  It's also a good idea to know your test results and keep a list of the medicines you take. How can you care for yourself at home? Watch for changes in your weight and condition · Weigh yourself without clothing at the same time each day. Record your weight. Call your doctor if you gain 3 pounds or more in 2 to 3 days. A sudden weight gain may mean that your heart failure is getting worse. · Keep a daily record of your symptoms. Write down any changes in how you feel, such as new shortness of breath, cough, or problems eating. Also record if your ankles are more swollen than usual and if you have to urinate in the night more often. Note anything that you ate or did that could have triggered these changes. Limit sodium Sodium causes your body to hold on to extra water. This may cause your heart failure symptoms to get worse. People get most of their sodium from processed foods. Fast food and restaurant meals also tend to be very high in sodium. · Your doctor may suggest that you limit sodium to 2,000 milligrams (mg) a day or less. That is less than 1 teaspoon of salt a day, including all the salt you eat in cooking or in packaged foods. · Read food labels on cans and food packages. They tell you how much sodium you get in one serving. Check the serving size. If you eat more than one serving, you are getting more sodium. · Be aware that sodium can come in forms other than salt, including monosodium glutamate (MSG), sodium citrate, and sodium bicarbonate (baking soda). MSG is often added to Asian food. You can sometimes ask for food without MSG or salt. · Slowly reducing salt will help you adjust to the taste. Take the salt shaker off the table. · Flavor your food with garlic, lemon juice, onion, vinegar, herbs, and spices instead of salt. Do not use soy sauce, steak sauce, onion salt, garlic salt, mustard, or ketchup on your food, unless it is labeled \"low-sodium\" or \"low-salt. \" 
· Make your own salad dressings, sauces, and ketchup without adding salt. · Use fresh or frozen ingredients, instead of canned ones, whenever you can. Choose low-sodium canned goods. · Eat less processed food and food from restaurants, including fast food. Exercise as directed Moderate, regular exercise is very good for your heart. It improves your blood flow and helps control your weight. But too much exercise can stress your heart and cause a heart failure flare-up. · Check with your doctor before you start an exercise program. 
· Walking is an easy way to get exercise. Start out slowly. Gradually increase the length and pace of your walk. Swimming, riding a bike, and using a treadmill are also good forms of exercise. · When you exercise, watch for signs that your heart is working too hard. You are pushing yourself too hard if you cannot talk while you are exercising. If you become short of breath or dizzy or have chest pain, stop, sit down, and rest. 
· Do not exercise when you do not feel well. Take medicines correctly · Take your medicines exactly as prescribed. Call your doctor if you think you are having a problem with your medicine. · Make a list of all the medicines you take. Include those prescribed to you by other doctors and any over-the-counter medicines, vitamins, or supplements you take. Take this list with you when you go to any doctor. · Take your medicines at the same time every day. It may help you to post a list of all the medicines you take every day and what time of day you take them. · Make taking your medicine as simple as you can. Plan times to take your medicines when you are doing other things, such as eating a meal or getting ready for bed. This will make it easier to remember to take your medicines. · Get organized. Use helpful tools, such as daily or weekly pill containers. When should you call for help? Call 911 if you have symptoms of sudden heart failure such as: 
· You have severe trouble breathing. · You cough up pink, foamy mucus. · You have a new irregular or rapid heartbeat. Call your doctor now or seek immediate medical care if: 
· You have new or increased shortness of breath. · You are dizzy or lightheaded, or you feel like you may faint. · You have sudden weight gain, such as 3 pounds or more in 2 to 3 days. · You have increased swelling in your legs, ankles, or feet. · You are suddenly so tired or weak that you cannot do your usual activities. Watch closely for changes in your health, and be sure to contact your doctor if you develop new symptoms. Where can you learn more? Go to http://micaela-jozef.info/. Enter U336 in the search box to learn more about \"Avoiding Triggers With Heart Failure: Care Instructions. \" Current as of: November 15, 2016 Content Version: 11.2 © 1840-9036 [employer]. Care instructions adapted under license by [employer] (which disclaims liability or warranty for this information). If you have questions about a medical condition or this instruction, always ask your healthcare professional. [address] any warranty or liability for your use of this information. Limiting Sodium and Fluids With Heart Failure: Care Instructions Your Care Instructions Sodium causes your body to hold on to extra water. This may cause your heart failure symptoms to get worse. Limiting sodium may help you feel better and lower your risk of having to go to the hospital. 
People get most of their sodium from processed foods. Fast food and restaurant meals also tend to be very high in sodium. Your doctor may suggest that you limit sodium to 2,000 milligrams (mg) a day or less. That is less than 1 teaspoon of salt a day, including all the salt you eat in cooked or packaged foods. Usually, you have to limit the amount of liquids you drink only if your heart failure is severe.  Limiting sodium alone often is enough to help your body get rid of extra fluids. However, your doctor may tell you to limit your fluid intake to a set amount each day. Follow-up care is a key part of your treatment and safety. Be sure to make and go to all appointments, and call your doctor if you are having problems. It's also a good idea to know your test results and keep a list of the medicines you take. How can you care for yourself at home? Read food labels · Read food labels on cans and food packages. The labels tell you how much sodium is in each serving. Make sure that you look at the serving size. If you eat more than the serving size, you have eaten more sodium than is listed for one serving. · Food labels also tell you the Percent Daily Value. If the Percent Daily Value says 50%, it means that you will get at least 50% of all the sodium you need for the entire day in one serving. Choose products with low Percent Daily Values for sodium. · Be aware that sodium can come in forms other than salt, including monosodium glutamate (MSG), sodium citrate, and sodium bicarbonate (baking soda). MSG is often added to Asian food. You can sometimes ask for food without MSG or salt. Buy low-sodium foods · Buy foods that are labeled \"unsalted\" (no salt added), \"sodium-free\" (less than 5 mg of sodium per serving), or \"low-sodium\" (less than 140 mg of sodium per serving). A food labeled \"light sodium\" has less than half of the full-sodium version of that food. Foods labeled \"reduced-sodium\" may still have too much sodium. · Buy fresh vegetables or plain, frozen vegetables. Buy low-sodium versions of canned vegetables, soups, and other canned goods. Prepare low-sodium meals · Use less salt each day when cooking. Reducing salt in this way will help you adjust to the taste. Do not add salt after cooking. Take the salt shaker off the table.  
· Flavor your food with garlic, lemon juice, onion, vinegar, herbs, and spices instead of salt. Do not use soy sauce, steak sauce, onion salt, garlic salt, mustard, or ketchup on your food. · Make your own salad dressings, sauces, and ketchup without adding salt. · Use less salt (or none) when recipes call for it. You can often use half the salt a recipe calls for without losing flavor. Other dishes like rice, pasta, and grains do not need added salt. · Rinse canned vegetables. This removes somebut not allof the salt. · Avoid water that has a naturally high sodium content or that has been treated with water softeners, which add sodium. Call your local water company to find out the sodium content of your water supply. If you buy bottled water, read the label and choose a sodium-free brand. Avoid high-sodium foods, such as: 
· Smoked, cured, salted, and canned meat, fish, and poultry. · Ham, waggoner, hot dogs, and luncheon meats. · Regular, hard, and processed cheese and regular peanut butter. · Crackers with salted tops. · Frozen prepared meals. · Canned and dried soups, broths, and bouillon, unless labeled sodium-free or low-sodium. · Canned vegetables, unless labeled sodium-free or low-sodium. · Salted snack foods such as chips and pretzels. · Western Mallory fries, pizza, tacos, and other fast foods. · Pickles, olives, ketchup, and other condiments, especially soy sauce, unless labeled sodium-free or low-sodium. If you cannot cook for yourself · Have family members or friends help you, or have someone cook low-sodium meals. · Check with your local senior nutrition program to find out where meals are served and whether they offer a low-sodium option. You can often find these programs through your local health department or hospital. 
· Have meals delivered to your home. Most [facility] have a Meals on [employer]. These programs provide one hot meal a day for older adults, delivered to their homes. Ask whether these meals are low-sodium.  Let them know that you are on a low-sodium diet. Limiting fluid intake · Find a method that works for you. You might simply write down how much you drink every time you do. Some people keep a container filled with the amount of fluid allowed for that day. If they drink from a source other than the container, then they pour out that amount. · Measure your regular drinking glasses to find out how much fluid each one holds. Once you know this, you will not have to measure every time. · Besides water, milk, juices, and other drinks, some foods have a lot of fluid. Count any foods that will melt (such as ice cream or gelatin dessert) or liquid foods (such as soup) as part of your fluid intake for the day. Where can you learn more? Go to http://micaela-jozef.info/. Enter A166 in the search box to learn more about \"Limiting Sodium and Fluids With Heart Failure: Care Instructions. \" Current as of: November 15, 2016 Content Version: 11.2 © 5433-8558 [employer]. Care instructions adapted under license by [employer] (which disclaims liability or warranty for this information). If you have questions about a medical condition or this instruction, always ask your healthcare professional. [place] [address] any warranty or liability for your use of this information. Learning About Heart Failure Zones What are heart failure zones? Heart failure zones give you an easy way to see changes in your heart failure symptoms. They also tell you when you need to get help. Check every day to see which zone you are in. Green zone. You are doing well. This is where you want to be. · Your weight is stable. This means it is not going up or down. · You breathe easily. · You are sleeping well. You are able to lie flat without shortness of breath. · You can do your usual activities. Yellow zone. Be careful. Your symptoms are changing. Call your doctor. · You have new or increased shortness of breath. · You are dizzy or lightheaded, or you feel like you may faint. · You have sudden weight gain, such as 3 pounds or more in 2 to 3 days. · You have increased swelling in your legs, ankles, or feet. · You are so tired or weak that you cannot do your usual activities. · You are not sleeping well. Shortness of breath wakes you up at night. You need extra pillows. Your doctor's name: ____________________________________________________________ Your doctor's contact information: _________________________________________________ Red zone. This is an emergency. Call 911. You have symptoms of sudden heart failure, such as: 
· You have severe trouble breathing. · You cough up pink, foamy mucus. · You have a new irregular or fast heartbeat. You have symptoms of a heart attack. These may include: · Chest pain or pressure, or a strange feeling in the chest. 
· Sweating. · Shortness of breath. · Nausea or vomiting. · Pain, pressure, or a strange feeling in the back, neck, jaw, or upper belly or in one or both shoulders or arms. · Lightheadedness or sudden weakness. · A fast or irregular heartbeat. If you have symptoms of a heart attack: After you call 911, the  may tell you to chew 1 adult-strength or 2 to 4 low-dose aspirin. Wait for an ambulance. Do not try to drive yourself. Follow-up care is a key part of your treatment and safety. Be sure to make and go to all appointments, and call your doctor if you are having problems. It's also a good idea to know your test results and keep a list of the medicines you take. Where can you learn more? Go to [email or web address] Enter T174 in the search box to learn more about \"Learning About Heart Failure Zones. \" Current as of: January 27, 2016 Content Version: 11.2 © 9704-2145 [employer].  Care instructions adapted under license by [address] (which disclaims liability or warranty for this information). If you have questions about a medical condition or this instruction, always ask your healthcare professional. [address] any warranty or liability for your use of this information.

## 2017-04-07 NOTE — PROGRESS NOTES
Judy Elaine NP contacted CHF bundle coordinator regarding concern for pt's health and safety. NP stated patient has fallen twice before she came to home visit. Caregiver is present and very helpful, however caregiver feels overwhelmed due to current situation. Patient currently very debilitated and NP feels patient may need a portable chest xray due to current condition. NP stated pt is lethargic, BP 90/65 pt coughing up brown tinged sputum. NP is going back to speak to patient and address possible transport to ER due to safety/welfare concerns.

## 2017-04-07 NOTE — IP AVS SNAPSHOT
Summary of Care Report The Summary of Care report has been created to help improve care coordination. Users with access to [employer] or [address] (Web-based application) may access additional patient information including the Discharge Summary. If you are not currently a [address] user and need more information, please call the number listed below in the [place] [address] section and ask to be connected with Medical Records. Facility Information Name Address Phone [address] [address] [phone] Patient Information Patient Name Sex ASAEL Contreras ([id number]) Male 1945 Discharge Information Admitting Provider Service Area Unit Lena Roajs MD / [facility] / [phone] Discharge Provider Discharge Date/Time Discharge Disposition Destination (none) 2017 Morning (Pending) AHR (none) Patient Language Language ENGLISH [13] Hospital Problems as of 2017  Reviewed: 2017  7:45 PM by Lena Rojas MD  
  
  
  
 Class Noted - Resolved Last Modified POA Active Problems Acute respiratory failure with hypoxia ([facility] [address]  2017 - Present 2017 by Lena Rojas MD Unknown Entered by Lena Rojas MD  
  Systolic CHF, acute on chronic ([address]  2017 - Present 2017 by Lena Rojas MD Unknown Entered by Lena Rojas MD  
  LANDEN (acute kidney injury) ([facility] [address]  2017 - Present 2017 by Lena Rojas MD Unknown Entered by Lena Rojas MD  
  HTN (hypertension)  2017 - Present 2017 by Lena Rojas MD Unknown Entered by Lena Rojas MD  
  V-tach [facility])  2017 - Present 2017 by Lena Rojas MD Unknown   Entered by Lena Rojas MD  
 AICD (automatic cardioverter/defibrillator) present  4/7/2017 - Present 4/7/2017 by Shae Mendiola MD Unknown Entered by Shae Mendiola MD  
  Agitation  4/11/2017 - Present 4/11/2017 by Cabrera Law MD Unknown Entered by Cabrera Law MD  
  Shortness of breath  4/11/2017 - Present 4/11/2017 by Cabrera Law MD Unknown Entered by Cabrera Law MD  
  ACP (advance care planning)  4/11/2017 - Present 4/11/2017 by Cabrera Law MD Unknown Entered by Cabrera Law MD  
  Debility  4/11/2017 - Present 4/11/2017 by Cabrera Law MD Unknown Entered by Cabrera Law MD  
  
Non-Hospital Problems as of 4/11/2017  Reviewed: 4/7/2017  7:45 PM by Shae Mendiola MD  
  
  
  
 Class Noted - Resolved Last Modified Active Problems Generalized weakness  3/13/2017 - Present 3/13/2017 by Mara Gustafson MD  
  Entered by Mara Gustafson MD  
  Cerebral microvascular disease  3/14/2017 - Present 3/14/2017 by Davie Chau MD  
  Entered by Davie Chau MD  
  
You are allergic to the following No active allergies Current Discharge Medication List  
  
CONTINUE these medications which have CHANGED Dose & Instructions Dispensing Information Comments  
 quiNIDine gluconate  mg SR tablet Commonly known as:  DURA-TABS What changed:  how much to take Dose:  648 mg Take 2 Tabs by mouth two (2) times a day. Quantity:  60 Tab Refills:  0 CONTINUE these medications which have NOT CHANGED Dose & Instructions Dispensing Information Comments  
 amiodarone 200 mg tablet Commonly known as:  CORDARONE Dose:  200 mg Take 200 mg by mouth daily. Refills:  0  
   
 ascorbic acid (vitamin C) 1,500 mg Tber Dose:  1500 mg Take 1,500 mg by mouth two (2) times a day. Refills:  0  
   
 aspirin 81 mg chewable tablet Dose:  81 mg Take 81 mg by mouth daily. Refills:  0  
   
 CALCIUM 600 + D 600-125 mg-unit Tab Generic drug:  calcium-cholecalciferol (d3) Dose:  1 Tab Take 1 Tab by mouth daily. Refills:  0  
   
 glimepiride 4 mg tablet Commonly known as:  AMARYL Dose:  4 mg Take 4 mg by mouth every morning. Refills:  0 GLUCOSAMINE COMPLEX PO Dose:  1 Tab Take 1 Tab by mouth daily. Refills:  0 JANUVIA 100 mg tablet Generic drug:  SITagliptin Dose:  100 mg Take 100 mg by mouth daily. Refills:  0  
   
 LASIX 20 mg tablet Generic drug:  furosemide Dose:  20 mg Take 20 mg by mouth daily. Refills:  0  
   
 metFORMIN 1,000 mg tablet Commonly known as:  GLUCOPHAGE Dose:  1000 mg Take 1,000 mg by mouth two (2) times daily (with meals). Refills:  0  
   
 metoprolol succinate 25 mg XL tablet Commonly known as:  TOPROL-XL Dose:  12.5 mg Take 0.5 Tabs by mouth daily. Quantity:  30 Tab Refills:  0  
   
 mexiletine 150 mg capsule Commonly known as:  MEXITIL Dose:  150 mg Take 150 mg by mouth two (2) times a day. Refills:  0  
   
 multivitamin tablet Commonly known as:  ONE A DAY Dose:  1 Tab Take 1 Tab by mouth daily. Refills:  0  
   
 omega 3-dha-epa-fish oil 360-1,200 mg Cpdr  
 Dose:  2400 mg Take 2,400 mg by mouth three (3) times daily. Refills:  0  
   
 UBIQUINONE PO Dose:  300 mg Take 300 mg by mouth daily. Refills:  0  
   
 vitamin E 400 unit capsule Commonly known as:  [address] Dose:  400 Units Take 400 Units by mouth daily. Refills:  0 Follow-up Information Follow up With Details Comments Contact Info Mirna Larsen MD Schedule an appointment as soon as possible for a visit in 2 weeks  [address] [place], [place] [address] [address] [phone] [facility]  you are being discharged here for transition of care [place] [address] 
[phone] Ivis Haji NP   [address] [place] [address] [address] [place] [address] [phone] Discharge Instructions MyChart Activation Thank you for requesting access to [employer]. Please follow the instructions below to securely access and download your online medical record. Just[employer]otted allows you to send messages to your doctor, view your test results, renew your prescriptions, schedule appointments, and more. How Do I Sign Up? 1. In your internet browser, go to [email or web address] 
2. Click on the First Time User? Click Here link in the Sign In box. You will be redirect to the New Member Sign Up page. 3. Enter your JustSpotted Access Code exactly as it appears below. You will not need to use this code after youve completed the sign-up process. If you do not sign up before the expiration date, you must request a new code. Just[employer]otted Access Code: [email or web address] Expires: 2017  3:41 PM (This is the date your JustSpotted access code will ) 4. Enter the last four digits of your Social Security Number (xxxx) and Date of Birth (mm/dd/yyyy) as indicated and click Submit. You will be taken to the next sign-up page. 5. Create a JustSpotted ID. This will be your JustSpotted login ID and cannot be changed, so think of one that is secure and easy to remember. 6. Create a JustSpotted password. You can change your password at any time. 7. Enter your Password Reset Question and Answer. This can be used at a later time if you forget your password. 8. Enter your e-mail address. You will receive e-mail notification when new information is available in [address] 9. Click Sign Up. You can now view and download portions of your medical record. 10. Click the Download Summary menu link to download a portable copy of your medical information. Additional Information If you have questions, please visit the Frequently Asked Questions section of the Just[employer]otted website at [email or web address] Remember, Just[employer]otted is NOT to be used for urgent needs.  For medical emergencies, dial 911. Chart Review Routing History Recipient Method Report Sent By Noelle Francis NP Fax: [phone] Phone: [phone] Fax Dian Workman MD NOTES AUTO ROUTING REPORT Ez Padilla MD [[id number]] 3/13/2017 10:34 PM 03/13/2017 Rosetta Francis NP Fax: [phone] Phone: [phone] Fax Dian Workman MD NOTES AUTO ROUTING REPORT Ez Padilla MD [[id number]] 3/17/2017 12:48 PM 03/17/2017 Rosetta Francis NP Fax: [phone] Phone: [phone] Fax Dian Workman MD NOTES AUTO ROUTING REPORT Brady Quinones MD [[id number]] 4/7/2017  8:09 PM 04/07/2017 Rosetta Francis NP Fax: [phone] Phone: [phone] Fax BSHSBARRY HAMLIN MD NOTES AUTO ROUTING REPORT Ez Padilla MD [[id number]] 4/11/2017 10:50 AM 04/11/2017 Rosetta Francis NP Fax: [phone] Phone: [phone] Fax BSHSBARRY HAMLIN MD NOTES AUTO ROUTING REPORT Ez Padilla MD [[id number]] 4/11/2017 11:32 AM 04/11/2017 Rosetta Francis NP Fax: [phone] Phone: [phone] Fax BSHSI BOUBACAR FLOR NOTES AUTO ROUTING REPORT Ez Padilla MD [[id number]] 4/11/2017 11:32 AM 04/11/2017

## 2017-04-07 NOTE — PROGRESS NOTES
Prelim  Echo report    EF 20-25%  Normal LV size w/ severely reduced LV systolic function  RV function low normal  Biatrial enlargement  Mitral regurg; mild-mod  Aortic regurg; at least mild  Tricuspid regurg; at least mod  ICD lead in RA/RV  No pericardial effusion    FULL REPORT TO FOLLOW

## 2017-04-07 NOTE — ED PROVIDER NOTES
HPI Comments: Oziel Collins is a 66 y.o. Male with hx significant for CHF, DM, asthma, and CAD who presents to ED [facility] ED via EMS with cc of gradually worsening generalized weakness today. Per EMS, pt lives by himself was found by his home health nurse today slumped over in a chair. EMS states pt was hypotensive and somnolent on arrival. EMS notes crackles on their lung examination. Pt reports he has fallen 2 times yesterday due to his knees giving out. He reports remembering the entire fall and denies any syncope or LOC associated. Pt also reports he vomited and had hallucinations yesterday. Pt notes recent discharge from rehabilitation on 4/3/17 and felt okay at that time. Pt reports he sees cardiology at [facility] and most recently had a pacemaker replaced 1 month ago. He notes he takes Amiodarone and believes all his symptoms are related to taking the medication. Pt also notes taking a diuretic daily. Pt denies any dizziness, SOB, CP, nausea, or diarrhea. PCP: Hina Ordoñez NP  Cardiologist: Willem Camarillo MD (U)    There are no other complaints, changes or physical findings at this time. The history is provided by the patient and the EMS personnel. Past Medical History:   Diagnosis Date    Asthma     COPD    CAD (coronary artery disease)     AICD implanted March 2017    Diabetes ([facility] [address]     Heart failure [facility])        Past Surgical History:   Procedure Laterality Date    HX PACEMAKER           History reviewed. No pertinent family history. Social History     Social History    Marital status: SINGLE     Spouse name: N/A    Number of children: N/A    Years of education: N/A     Occupational History    Not on file.      Social History Main Topics    Smoking status: Former Smoker    Smokeless tobacco: Not on file    Alcohol use No    Drug use: No    Sexual activity: Not on file     Other Topics Concern    Not on file     Social History Narrative         ALLERGIES: Review of patient's allergies indicates no known allergies. Review of Systems   Constitutional: Negative for chills and fatigue. HENT: Negative for congestion, rhinorrhea, sneezing and sore throat. Eyes: Negative for redness and visual disturbance. Respiratory: Negative for shortness of breath. Cardiovascular: Negative for chest pain and leg swelling. Gastrointestinal: Positive for vomiting. Genitourinary: Negative for difficulty urinating and frequency. Musculoskeletal: Negative for back pain, myalgias and neck stiffness. Skin: Negative for rash. Neurological: Positive for weakness (generalized). Negative for dizziness and syncope. Denies LOC   Hematological: Negative for adenopathy. Psychiatric/Behavioral: Positive for hallucinations. Patient Vitals for the past 12 hrs:   Temp Pulse Resp BP SpO2   04/07/17 2239 - 72 - 137/87 -   04/07/17 2222 - 71 21 - 100 %   04/07/17 2215 - 73 17 118/74 -   04/07/17 2200 - 73 19 117/80 -   04/07/17 2145 - 75 19 125/74 100 %   04/07/17 2130 - 67 25 128/55 (!) 85 %   04/07/17 2115 - 65 20 118/69 90 %   04/07/17 2100 - 63 20 119/69 92 %   04/07/17 2045 - 62 21 123/63 (!) 87 %   04/07/17 2030 - 60 20 120/64 (!) 85 %   04/07/17 2024 - 61 24 - (!) 83 %   04/07/17 1824 - 60 18 103/82 -   04/07/17 1600 96.9 °F (36.1 °C) 68 20 101/72 -   04/07/17 1551 - 64 16 - -   04/07/17 1550 - 59 18 113/62 -            Physical Exam   Constitutional: He is oriented to person, place, and time. He appears well-developed and well-nourished. HENT:   Head: Normocephalic and atraumatic. Mouth/Throat: Oropharynx is clear and moist. Mucous membranes are dry. Eyes: EOM are normal.   Neck: Normal range of motion and full passive range of motion without pain. Neck supple. Cardiovascular: Normal rate, regular rhythm, intact distal pulses and normal pulses. No murmur heard. Distant heart sounds   Pulmonary/Chest: Effort normal. No respiratory distress.  He exhibits no tenderness. Crackles in the bases, Pacemaker in the L upper chest with steri strips in place   Abdominal: Soft. Normal appearance and bowel sounds are normal. There is no tenderness. There is no rebound and no guarding. Musculoskeletal:   3+ pitting edema BL lower extremities (R>L)   Neurological: He is alert and oriented to person, place, and time. He has normal strength. Slow to answer questions    Skin: Skin is warm, dry and intact. No rash noted. No erythema. There is pallor. Extremities cool to touch, diffuse abrasions in BL forearms   Psychiatric: He has a normal mood and affect. His speech is normal and behavior is normal. Judgment and thought content normal.   Nursing note and vitals reviewed. MDM  Number of Diagnoses or Management Options  Acute encephalopathy:   Acute hyperkalemia:   Acute on chronic renal insufficiency:   Acute respiratory insufficiency (HCC):   Elevated lactic acid level:   Hyperglycemia:   Hypoxia:   Pleural effusion:   Diagnosis management comments: DDx: volume overload, pericardial effusion, pulmonary edema, anemia, hypothermia       Amount and/or Complexity of Data Reviewed  Clinical lab tests: ordered and reviewed  Tests in the radiology section of CPT®: ordered and reviewed  Tests in the medicine section of CPT®: reviewed and ordered  Obtain history from someone other than the patient: yes (EMS)  Review and summarize past medical records: yes  Discuss the patient with other providers: yes (Cardiologist, hospitalist)  Independent visualization of images, tracings, or specimens: yes    Risk of Complications, Morbidity, and/or Mortality  Presenting problems: high  Diagnostic procedures: high  Management options: high    Critical Care  Total time providing critical care: 30-74 minutes    Patient Progress  Patient progress: improved    ED Course       Procedures     ED EKG interpretation: 16:08  Rhythm:atrial paced; and regular .  Rate (approx.): 61; P wave: normal; ST/T wave: non-specific changes; Other findings: abnormal ekg. This EKG was interpreted by Dionne Corrales MD,ED Provider. PROGRESS NOTE:  4:35 PM  Pt's SpO2 currently 86% on room air and rectal temperature 98.6F. Will pt on 2L NC and continue to monitor. Written by RAMÓN Bellamy, as dictated by Dionne Corrales MD.    CONSULT NOTE:  5:19 PM  Dionne Corrales MD with Surjit Augustin DO,  Specialty: Cardiology   Discussed patient's hx, disposition, and available diagnostic and imaging results. Reviewed care plans. Consultant agrees with plans as outlined. Dr. Benjamin Silverman agrees with having echo completed and states he will read the results. PROGRESS NOTES:  5:28 PM  Paged echo tech. Written by RAMÓN Bellamy, as dictated by Dionne Corrales MD.    5:33 PM  Pt's lactate is 4.4. Already volume overloaded on exam.  Written by RAMÓN Bellamy, as dictated by Dionne Corrales MD.    Discussion on IVF Resuscitation:  6:16 PM    This patient has been identified as at risk for severe sepsis based on their elevated lactate level. Based on this patient's current presentation and significant PMH CHF it is likely that aggressive fluid resuscitation efforts (e.g. 30cc/kg IVF bolus) would be detrimental to their health. I have discussed my concerns,  the risks and possible benefits of this recommended therapy with the patient and/or family who agree that the risks are too great and REFUSES further aggressive fluid resuscitation at this time. The patient will continue to be monitored closely and frequently re-evaluated for any potential changes that may be needed in their plan of care. Written by RAMÓN Dyer, as dictated by Dionne Corrales MD    PROGRESS NOTES:  6:34 PM  Informed by ED nurse that pt's potassium is 6.9.   Written by RAMÓN Bellamy, as dictated by Dionne Corralse MD.    6:46 PM  Dr. Benjamin Silverman has viewed the echo and reports no pericardial effusion or signs of tamponade. Written by RAMÓN Bradley, as dictated by Jay Umaña MD.    CONSULT NOTE:   6:52 PM  Jay Umaña MD spoke with Dr. Cailin Cullen,   Specialty: Hospitalist  Discussed pt's hx, disposition, and available diagnostic and imaging results. Reviewed care plans. Consultant will evaluate pt for admission. Dr. Mikhail Kam would like the pt to have a central line and will admit. Written by RAMÓN Cleary, as dictated by Jay Umaña MD.    Procedure Note - Central Line Placement:   8:48 PM  Performed by: Jay Umaña MD    Immediately prior to the procedure, the patient was reevaluated and found suitable for the planned procedure and any planned medications. Immediately prior to the procedure a time out was called to verify the correct patient, procedure, equipment, staff, and marking as appropriate. Area was cleansed with Chlorprep and anesthetized with 3mLs of 1% lidocaine. Prepped and draped in sterile fashion. Landmarks identified. 20 gauge needle with triple lumen catheter was inserted into pt's Right, Internal Jugular Vein with ultrasound guidance. Line sutured in place; sterile dressing applied. Position: Trendelenburg  Number of attempts: 1  Estimated blood loss: minimal  The procedure took 16-30 minutes, and pt tolerated well.   Written by RAMÓN Cleary, as dictated by Jay Umaña MD.       CRITICAL CARE NOTE :    IMPENDING DETERIORATION -Airway, Respiratory, Cardiovascular, Metabolic and Renal    ASSOCIATED RISK FACTORS - Hypotension, Hypoxia and Metabolic changes    MANAGEMENT- Bedside Assessment and Supervision of Care    INTERPRETATION -  Xrays, Blood Gases, ECG, cardiac output measures, and Blood Pressure    INTERVENTIONS - hemodynamic mngmt and Metobolic interventions    CASE REVIEW - Hospitalist, Medical Sub-Specialist, Nursing and Family    TREATMENT RESPONSE -Improved    PERFORMED BY - Self    NOTES   :    I have spent 65 minutes of critical care time involved in lab review, consultations with specialist, family decision- making, bedside attention and documentation. During this entire length of time I was immediately available to the patient . Dave Mills MD        LABORATORY TESTS:  Recent Results (from the past 12 hour(s))   EKG, 12 LEAD, INITIAL    Collection Time: 04/07/17  4:08 PM   Result Value Ref Range    Ventricular Rate 61 BPM    Atrial Rate 61 BPM    P-R Interval 160 ms    QRS Duration 18 ms    Q-T Interval 528 ms    QTC Calculation (Bezet) 531 ms    Calculated P Axis 92 degrees    Calculated R Axis 0 degrees    Calculated T Axis 93 degrees    Diagnosis       Atrial-paced rhythm with occasional ventricular-paced complexes  Indeterminate axis  Pulmonary disease pattern  ST elevation, consider anterolateral injury or acute infarct  ST elevation, consider inferior injury or acute infarct  Prolonged QT  ** ** ACUTE MI / STEMI ** **  Consider right ventricular involvement in acute inferior infarct  Abnormal ECG  When compared with ECG of 13-MAR-2017 15:24,  Vent. rate has decreased BY   6 BPM     CBC WITH AUTOMATED DIFF    Collection Time: 04/07/17  4:35 PM   Result Value Ref Range    WBC 8.3 4.1 - 11.1 K/uL    RBC 3.89 (L) 4.10 - 5.70 M/uL    HGB 12.0 (L) 12.1 - 17.0 g/dL    HCT 38.1 36.6 - 50.3 %    MCV 97.9 80.0 - 99.0 FL    MCH 30.8 26.0 - 34.0 PG    MCHC 31.5 30.0 - 36.5 g/dL    RDW 15.1 (H) 11.5 - 14.5 %    PLATELET 728 635 - 911 K/uL    NEUTROPHILS 82 (H) 32 - 75 %    LYMPHOCYTES 8 (L) 12 - 49 %    MONOCYTES 10 5 - 13 %    EOSINOPHILS 0 0 - 7 %    BASOPHILS 0 0 - 1 %    ABS. NEUTROPHILS 6.8 1.8 - 8.0 K/UL    ABS. LYMPHOCYTES 0.7 (L) 0.8 - 3.5 K/UL    ABS. MONOCYTES 0.8 0.0 - 1.0 K/UL    ABS. EOSINOPHILS 0.0 0.0 - 0.4 K/UL    ABS.  BASOPHILS 0.0 0.0 - 0.1 K/UL    RBC COMMENTS MACROCYTOSIS  1+       METABOLIC PANEL, COMPREHENSIVE    Collection Time: 04/07/17  4:35 PM   Result Value Ref Range    Sodium 137 136 - 145 mmol/L    Potassium HEMOLYZED,RECOLLECT REQUESTED 3.5 - 5.1 mmol/L    Chloride 102 97 - 108 mmol/L    CO2 24 21 - 32 mmol/L    Anion gap 11 5 - 15 mmol/L    Glucose 187 (H) 65 - 100 mg/dL    BUN 51 (H) 6 - 20 MG/DL    Creatinine 1.62 (H) 0.70 - 1.30 MG/DL    BUN/Creatinine ratio 31 (H) 12 - 20      GFR est AA 51 (L) >60 ml/min/1.73m2    GFR est non-AA 42 (L) >60 ml/min/1.73m2    Calcium 8.6 8.5 - 10.1 MG/DL    Bilirubin, total 0.6 0.2 - 1.0 MG/DL    ALT (SGPT) 119 (H) 12 - 78 U/L    AST (SGOT) 109 (H) 15 - 37 U/L    Alk. phosphatase 91 45 - 117 U/L    Protein, total 7.1 6.4 - 8.2 g/dL    Albumin 3.0 (L) 3.5 - 5.0 g/dL    Globulin 4.1 (H) 2.0 - 4.0 g/dL    A-G Ratio 0.7 (L) 1.1 - 2.2     CK W/ CKMB & INDEX    Collection Time: 04/07/17  4:35 PM   Result Value Ref Range     39 - 308 U/L    CK - MB 3.2 <3.6 NG/ML    CK-MB Index 2.3 0 - 2.5     TROPONIN I    Collection Time: 04/07/17  4:35 PM   Result Value Ref Range    Troponin-I, Qt. <0.04 <0.05 ng/mL   PRO-BNP    Collection Time: 04/07/17  4:35 PM   Result Value Ref Range    NT pro-BNP 25774 (H) 0 - 125 PG/ML   LACTIC ACID, PLASMA    Collection Time: 04/07/17  4:35 PM   Result Value Ref Range    Lactic acid 4.4 (HH) 0.4 - 2.0 MMOL/L   METABOLIC PANEL, COMPREHENSIVE    Collection Time: 04/07/17  5:50 PM   Result Value Ref Range    Sodium 138 136 - 145 mmol/L    Potassium 6.9 (HH) 3.5 - 5.1 mmol/L    Chloride 101 97 - 108 mmol/L    CO2 30 21 - 32 mmol/L    Anion gap 7 5 - 15 mmol/L    Glucose 184 (H) 65 - 100 mg/dL    BUN 52 (H) 6 - 20 MG/DL    Creatinine 1.61 (H) 0.70 - 1.30 MG/DL    BUN/Creatinine ratio 32 (H) 12 - 20      GFR est AA 51 (L) >60 ml/min/1.73m2    GFR est non-AA 42 (L) >60 ml/min/1.73m2    Calcium 8.6 8.5 - 10.1 MG/DL    Bilirubin, total 0.4 0.2 - 1.0 MG/DL    ALT (SGPT) 103 (H) 12 - 78 U/L    AST (SGOT) 75 (H) 15 - 37 U/L    Alk.  phosphatase 80 45 - 117 U/L    Protein, total 6.1 (L) 6.4 - 8.2 g/dL    Albumin 2.8 (L) 3.5 - 5.0 g/dL    Globulin 3.3 2.0 - 4.0 g/dL    A-G Ratio 0.8 (L) 1.1 - 2.2     BLOOD GAS, ARTERIAL    Collection Time: 04/07/17  7:00 PM   Result Value Ref Range    pH 7.37 7.35 - 7.45      PCO2 44 35.0 - 45.0 mmHg    PO2 105 (H) 80 - 100 mmHg    O2 SAT 98 (H) 92 - 97 %    BICARBONATE 25 22 - 26 mmol/L    BASE DEFICIT 0.6 mmol/L    O2 METHOD ROOM AIR      MODE OTHER      Sample source ARTERIAL      SITE RIGHT RADIAL      RAD'S TEST YES     GLUCOSE, POC    Collection Time: 04/07/17  8:02 PM   Result Value Ref Range    Glucose (POC) 152 (H) 65 - 100 mg/dL    Performed by Robert Fenrandez    METABOLIC PANEL, BASIC    Collection Time: 04/07/17  9:42 PM   Result Value Ref Range    Sodium 141 136 - 145 mmol/L    Potassium 5.9 (H) 3.5 - 5.1 mmol/L    Chloride 104 97 - 108 mmol/L    CO2 27 21 - 32 mmol/L    Anion gap 10 5 - 15 mmol/L    Glucose 208 (H) 65 - 100 mg/dL    BUN 51 (H) 6 - 20 MG/DL    Creatinine 1.82 (H) 0.70 - 1.30 MG/DL    BUN/Creatinine ratio 28 (H) 12 - 20      GFR est AA 45 (L) >60 ml/min/1.73m2    GFR est non-AA 37 (L) >60 ml/min/1.73m2    Calcium 9.2 8.5 - 10.1 MG/DL   LACTIC ACID, PLASMA    Collection Time: 04/07/17  9:42 PM   Result Value Ref Range    Lactic acid 7.5 (HH) 0.4 - 2.0 MMOL/L       IMAGING RESULTS:  CXR Results  (Last 48 hours)               04/07/17 1714  XR CHEST PORT Final result    Impression:  IMPRESSION:       Improved lung aeration since last month. Left pleural effusion versus left lung   base atelectasis versus pneumonia. Consider PA and lateral chest radiographs   when the patient can better tolerate. Narrative:  EXAM:  XR CHEST PORT       INDICATION:  Hypotension, fatigue, and falls. Lung crackles. COMPARISON: Chest views on 3/13/2017       TECHNIQUE: Semiupright portable chest AP view       FINDINGS: The defibrillator battery pack and leads are unchanged.  Cardiac   monitoring wires overlie the thorax. The cardiomediastinal and hilar contours   are within normal limits. The pulmonary vasculature is within normal limits. Ill-defined left lung base opacity. Right lung is clear. No pneumothorax. Bones   are osteopenic. MEDICATIONS GIVEN:  Medications   DOBUTamine (DOBUTREX) 500 mg/250 mL (2,000 mcg/mL) infusion (5 mcg/kg/min × 72.6 kg IntraVENous New Bag 4/7/17 2239)   albuterol (PROVENTIL VENTOLIN) nebulizer solution 5 mg (5 mg Nebulization Given 4/7/17 1930)   dextrose (D50W) injection syrg 25 g (25 g IntraVENous Given 4/7/17 2118)   insulin regular (NOVOLIN R, HUMULIN R) injection 10 Units (10 Units IntraVENous Given 4/7/17 1837)   sodium polystyrene (KAYEXALATE) 15 gram/60 mL oral suspension 15 g (15 g Oral Given 4/7/17 1919)   sodium polystyrene (KAYEXALATE) 15 gram/60 mL oral suspension 30 g (30 g Oral Given 4/7/17 1919)   calcium gluconate 1 g in 0.9% sodium chloride 100 mL IVPB (0 g IntraVENous IV Completed 4/7/17 2056)   sodium polystyrene (KAYEXALATE) 15 gram/60 mL oral suspension 15 g (15 g Oral Given 4/7/17 2115)       IMPRESSION:  1. Acute encephalopathy    2. Acute respiratory insufficiency (HCC)    3. Hypoxia    4. Hyperglycemia    5. Acute on chronic renal insufficiency    6. Elevated lactic acid level    7. Pleural effusion    8. Acute hyperkalemia        PLAN:  1. Admit to hospitalist    ADMIT NOTE:  6:54 PM  Patient is being admitted to the hospital by Dr. Dayday Argueta. The results of their tests and reasons for their admission have been discussed with them and/or available family. They convey agreement and understanding for the need to be admitted and for their admission diagnosis. Consultation has been made with the inpatient physician specialist for hospitalization.     ATTESTATION:  This note is prepared by Krishna Lyons and Jessi  acting as Scribes for David Michel MD.    David Michel MD: The scribes' documentation has been prepared under my direction and personally reviewed by me in its entirety. I confirm that the note above accurately reflects all work, treatment, procedures, and medical decision making performed by me.

## 2017-04-07 NOTE — IP AVS SNAPSHOT
[address] [facility] 
[phone] Patient: Adolfo Mckay MRN: [id number] EML:[birth year] You are allergic to the following No active allergies Recent Documentation Height Weight BMI Smoking Status 1.905 m 74 kg 20.39 kg/m2 Former Smoker Emergency Contacts Name Discharge Info Relation Home Work Mobile Betsey Jo [24] [phone] Norman Avina  Other Relative [6] [phone] About your hospitalization You were admitted on:  April 7, 2017 You last received care in the:  [facility] 2 PROGRESSIVE CARE You were discharged on:  April 11, 2017 Why you were hospitalized Your primary diagnosis was:  Not on File Your diagnoses also included:  Acute Respiratory Failure With Hypoxia (Hcc), Systolic Chf, Acute On Chronic (Hcc), Audi (Acute Kidney Injury) (Hcc), Htn (Hypertension), V-Tach (Hcc), Aicd (Automatic Cardioverter/Defibrillator) Present, Agitation, Shortness Of Breath, Acp (Advance Care Planning), Debility Providers Seen During Your Hospitalizations Provider Role Specialty Primary office phone Willis Rinaldi MD Attending Provider Emergency Medicine [phone] Kevin Sarmiento MD Attending Provider Internal Medicine [phone] Your Primary Care Physician (PCP) Primary Care Physician Office Phone Office Fax Dilancharmaine Kim [phone] Follow-up Information Follow up With Details Comments Contact Info Cortes Castillo MD Schedule an appointment as soon as possible for a visit in 2 weeks  [address] [place] [address] 7 [phone] 
[phone] [address] [facility]  you are being discharged here for transition of care Luite Charly 70 
[phone] Qing Melgar NP   [address] [place] [address] [address] [facility] [address] [phone] Current Discharge Medication List  
  
CONTINUE these medications which have CHANGED Dose & Instructions Dispensing Information Comments Morning Noon Evening Bedtime  
 quiNIDine gluconate  mg SR tablet Commonly known as:  DURA-TABS What changed:  how much to take Your last dose was: Your next dose is:    
   
   
 Dose:  648 mg Take 2 Tabs by mouth two (2) times a day. Quantity:  60 Tab Refills:  0 CONTINUE these medications which have NOT CHANGED Dose & Instructions Dispensing Information Comments Morning Noon Evening Bedtime  
 amiodarone 200 mg tablet Commonly known as:  CORDARONE Your last dose was: Your next dose is:    
   
   
 Dose:  200 mg Take 200 mg by mouth daily. Refills:  0  
     
   
   
   
  
 ascorbic acid (vitamin C) 1,500 mg Tber Your last dose was: Your next dose is:    
   
   
 Dose:  1500 mg Take 1,500 mg by mouth two (2) times a day. Refills:  0  
     
   
   
   
  
 aspirin 81 mg chewable tablet Your last dose was: Your next dose is:    
   
   
 Dose:  81 mg Take 81 mg by mouth daily. Refills:  0  
     
   
   
   
  
 CALCIUM 600 + D 600-125 mg-unit Tab Generic drug:  calcium-cholecalciferol (d3) Your last dose was: Your next dose is:    
   
   
 Dose:  1 Tab Take 1 Tab by mouth daily. Refills:  0  
     
   
   
   
  
 glimepiride 4 mg tablet Commonly known as:  AMARYL Your last dose was: Your next dose is:    
   
   
 Dose:  4 mg Take 4 mg by mouth every morning. Refills:  0 GLUCOSAMINE COMPLEX PO Your last dose was: Your next dose is:    
   
   
 Dose:  1 Tab Take 1 Tab by mouth daily. Refills:  0 JANUVIA 100 mg tablet Generic drug:  SITagliptin Your last dose was: Your next dose is: Dose:  100 mg Take 100 mg by mouth daily. Refills:  0  
     
   
   
   
  
 LASIX 20 mg tablet Generic drug:  furosemide Your last dose was: Your next dose is:    
   
   
 Dose:  20 mg Take 20 mg by mouth daily. Refills:  0  
     
   
   
   
  
 metFORMIN 1,000 mg tablet Commonly known as:  GLUCOPHAGE Your last dose was: Your next dose is:    
   
   
 Dose:  1000 mg Take 1,000 mg by mouth two (2) times daily (with meals). Refills:  0  
     
   
   
   
  
 metoprolol succinate 25 mg XL tablet Commonly known as:  TOPROL-XL Your last dose was: Your next dose is:    
   
   
 Dose:  12.5 mg Take 0.5 Tabs by mouth daily. Quantity:  30 Tab Refills:  0  
     
   
   
   
  
 mexiletine 150 mg capsule Commonly known as:  MEXITIL Your last dose was: Your next dose is:    
   
   
 Dose:  150 mg Take 150 mg by mouth two (2) times a day. Refills:  0  
     
   
   
   
  
 multivitamin tablet Commonly known as:  ONE A DAY Your last dose was: Your next dose is:    
   
   
 Dose:  1 Tab Take 1 Tab by mouth daily. Refills:  0  
     
   
   
   
  
 omega 3-dha-epa-fish oil 360-1,200 mg Cpdr  
   
Your last dose was: Your next dose is:    
   
   
 Dose:  2400 mg Take 2,400 mg by mouth three (3) times daily. Refills:  0  
     
   
   
   
  
 UBIQUINONE PO Your last dose was: Your next dose is:    
   
   
 Dose:  300 mg Take 300 mg by mouth daily. Refills:  0  
     
   
   
   
  
 vitamin E 400 unit capsule Commonly known as:  [address] Your last dose was: Your next dose is:    
   
   
 Dose:  400 Units Take 400 Units by mouth daily. Refills:  0 Where to Get Your Medications Information on where to get these meds will be given to you by the nurse or doctor. ! Ask your nurse or doctor about these medications  
  metoprolol succinate 25 mg XL tablet  
 quiNIDine gluconate  mg SR tablet Discharge Instructions MyChart Activation Thank you for requesting access to [employer]. Please follow the instructions below to securely access and download your online medical record. [employer] allows you to send messages to your doctor, view your test results, renew your prescriptions, schedule appointments, and more. How Do I Sign Up? 1. In your internet browser, go to [email or web address] 
2. Click on the First Time User? Click Here link in the Sign In box. You will be redirect to the New Member Sign Up page. 3. Enter your [employer] Access Code exactly as it appears below. You will not need to use this code after youve completed the sign-up process. If you do not sign up before the expiration date, you must request a new code. [employer] Access Code: [email or web address] Expires: 2017  3:41 PM (This is the date your [employer] access code will ) 4. Enter the last four digits of your Social Security Number (xxxx) and Date of Birth (mm/dd/yyyy) as indicated and click Submit. You will be taken to the next sign-up page. 5. Create a [employer] ID. This will be your [employer] login ID and cannot be changed, so think of one that is secure and easy to remember. 6. Create a [employer] password. You can change your password at any time. 7. Enter your Password Reset Question and Answer. This can be used at a later time if you forget your password. 8. Enter your e-mail address. You will receive e-mail notification when new information is available in [address] 9. Click Sign Up. You can now view and download portions of your medical record. 10. Click the Download Summary menu link to download a portable copy of your medical information. Additional Information If you have questions, please visit the Frequently Asked Questions section of the [employer] website at [email or web address] Remember, [employer] is NOT to be used for urgent needs. For medical emergencies, dial 911. Discharge Instructions Attachments/References HEART FAILURE: AVOIDING TRIGGERS (ENGLISH) HEART FAILURE: LIMITING SODIUM AND FLUIDS (ENGLISH) HEART FAILURE ZONES: GENERAL INFO (ENGLISH) Discharge Orders None General Information Please provide this summary of care documentation to your next provider. Introducing [facility]! [place] [employer] introduces [employer] patient portal. Now you can access parts of your medical record, email your doctor's office, and request medication refills online. 1. In your internet browser, go to [email or web address] 2. Click on the First Time User? Click Here link in the Sign In box. You will see the New Member Sign Up page. 3. Enter your [employer] Access Code exactly as it appears below. You will not need to use this code after youve completed the sign-up process. If you do not sign up before the expiration date, you must request a new code. · [employer] Access Code: [email or web address] Expires: 6/11/2017  3:41 PM 
 
4. Enter the last four digits of your Social Security Number (xxxx) and Date of Birth (mm/dd/yyyy) as indicated and click Submit. You will be taken to the next sign-up page. 5. Create a [employer] ID. This will be your [employer] login ID and cannot be changed, so think of one that is secure and easy to remember. 6. Create a [employer] password. You can change your password at any time. 7. Enter your Password Reset Question and Answer. This can be used at a later time if you forget your password. 8. Enter your e-mail address. You will receive e-mail notification when new information is available in [address] 9. Click Sign Up. You can now view and download portions of your medical record. 10. Click the Download Summary menu link to download a portable copy of your medical information. If you have questions, please visit the Frequently Asked Questions section of the [employer] website. Remember, [employer] is NOT to be used for urgent needs. For medical emergencies, dial 911. Now available from your iPhone and Android! Patient Signature:  ____________________________________________________________ Date:  ____________________________________________________________  
  
hospitals Provider Signature:  ____________________________________________________________ Date:  ____________________________________________________________ More Information Avoiding Triggers With Heart Failure: Care Instructions Your Care Instructions Triggers are anything that make your heart failure flare up. A flare-up is also called \"sudden heart failure\" or \"acute heart failure. \" When you have a flare-up, fluid builds up in your lungs, and you have problems breathing. You might need to go to the hospital. By watching for changes in your condition and avoiding triggers, you can prevent heart failure flare-ups. Follow-up care is a key part of your treatment and safety. Be sure to make and go to all appointments, and call your doctor if you are having problems. It's also a good idea to know your test results and keep a list of the medicines you take. How can you care for yourself at home? Watch for changes in your weight and condition · Weigh yourself without clothing at the same time each day. Record your weight. Call your doctor if you gain 3 pounds or more in 2 to 3 days. A sudden weight gain may mean that your heart failure is getting worse. · Keep a daily record of your symptoms. Write down any changes in how you feel, such as new shortness of breath, cough, or problems eating.  Also record if your ankles are more swollen than usual and if you have to urinate in the night more often. Note anything that you ate or did that could have triggered these changes. Limit sodium Sodium causes your body to hold on to extra water. This may cause your heart failure symptoms to get worse. People get most of their sodium from processed foods. Fast food and restaurant meals also tend to be very high in sodium. · Your doctor may suggest that you limit sodium to 2,000 milligrams (mg) a day or less. That is less than 1 teaspoon of salt a day, including all the salt you eat in cooking or in packaged foods. · Read food labels on cans and food packages. They tell you how much sodium you get in one serving. Check the serving size. If you eat more than one serving, you are getting more sodium. · Be aware that sodium can come in forms other than salt, including monosodium glutamate (MSG), sodium citrate, and sodium bicarbonate (baking soda). MSG is often added to Asian food. You can sometimes ask for food without MSG or salt. · Slowly reducing salt will help you adjust to the taste. Take the salt shaker off the table. · Flavor your food with garlic, lemon juice, onion, vinegar, herbs, and spices instead of salt. Do not use soy sauce, steak sauce, onion salt, garlic salt, mustard, or ketchup on your food, unless it is labeled \"low-sodium\" or \"low-salt. \" 
· Make your own salad dressings, sauces, and ketchup without adding salt. · Use fresh or frozen ingredients, instead of canned ones, whenever you can. Choose low-sodium canned goods. · Eat less processed food and food from restaurants, including fast food. Exercise as directed Moderate, regular exercise is very good for your heart. It improves your blood flow and helps control your weight. But too much exercise can stress your heart and cause a heart failure flare-up. · Check with your doctor before you start an exercise program. 
· Walking is an easy way to get exercise. Start out slowly.  Gradually increase the length and pace of your walk. Swimming, riding a bike, and using a treadmill are also good forms of exercise. · When you exercise, watch for signs that your heart is working too hard. You are pushing yourself too hard if you cannot talk while you are exercising. If you become short of breath or dizzy or have chest pain, stop, sit down, and rest. 
· Do not exercise when you do not feel well. Take medicines correctly · Take your medicines exactly as prescribed. Call your doctor if you think you are having a problem with your medicine. · Make a list of all the medicines you take. Include those prescribed to you by other doctors and any over-the-counter medicines, vitamins, or supplements you take. Take this list with you when you go to any doctor. · Take your medicines at the same time every day. It may help you to post a list of all the medicines you take every day and what time of day you take them. · Make taking your medicine as simple as you can. Plan times to take your medicines when you are doing other things, such as eating a meal or getting ready for bed. This will make it easier to remember to take your medicines. · Get organized. Use helpful tools, such as daily or weekly pill containers. When should you call for help? Call 911 if you have symptoms of sudden heart failure such as: 
· You have severe trouble breathing. · You cough up pink, foamy mucus. · You have a new irregular or rapid heartbeat. Call your doctor now or seek immediate medical care if: 
· You have new or increased shortness of breath. · You are dizzy or lightheaded, or you feel like you may faint. · You have sudden weight gain, such as 3 pounds or more in 2 to 3 days. · You have increased swelling in your legs, ankles, or feet. · You are suddenly so tired or weak that you cannot do your usual activities. Watch closely for changes in your health, and be sure to contact your doctor if you develop new symptoms. Where can you learn more? Go to http://micaela-jozef.info/. Enter P021 in the search box to learn more about \"Avoiding Triggers With Heart Failure: Care Instructions. \" Current as of: November 15, 2016 Content Version: 11.2 © 0158-2133 [employer]. Care instructions adapted under license by [employer] (which disclaims liability or warranty for this information). If you have questions about a medical condition or this instruction, always ask your healthcare professional. [address] any warranty or liability for your use of this information. Limiting Sodium and Fluids With Heart Failure: Care Instructions Your Care Instructions Sodium causes your body to hold on to extra water. This may cause your heart failure symptoms to get worse. Limiting sodium may help you feel better and lower your risk of having to go to the hospital. 
People get most of their sodium from processed foods. Fast food and restaurant meals also tend to be very high in sodium. Your doctor may suggest that you limit sodium to 2,000 milligrams (mg) a day or less. That is less than 1 teaspoon of salt a day, including all the salt you eat in cooked or packaged foods. Usually, you have to limit the amount of liquids you drink only if your heart failure is severe. Limiting sodium alone often is enough to help your body get rid of extra fluids. However, your doctor may tell you to limit your fluid intake to a set amount each day. Follow-up care is a key part of your treatment and safety. Be sure to make and go to all appointments, and call your doctor if you are having problems. It's also a good idea to know your test results and keep a list of the medicines you take. How can you care for yourself at home? Read food labels · Read food labels on cans and food packages.  The labels tell you how much sodium is in each serving. Make sure that you look at the serving size. If you eat more than the serving size, you have eaten more sodium than is listed for one serving. · Food labels also tell you the Percent Daily Value. If the Percent Daily Value says 50%, it means that you will get at least 50% of all the sodium you need for the entire day in one serving. Choose products with low Percent Daily Values for sodium. · Be aware that sodium can come in forms other than salt, including monosodium glutamate (MSG), sodium citrate, and sodium bicarbonate (baking soda). MSG is often added to Asian food. You can sometimes ask for food without MSG or salt. Buy low-sodium foods · Buy foods that are labeled \"unsalted\" (no salt added), \"sodium-free\" (less than 5 mg of sodium per serving), or \"low-sodium\" (less than 140 mg of sodium per serving). A food labeled \"light sodium\" has less than half of the full-sodium version of that food. Foods labeled \"reduced-sodium\" may still have too much sodium. · Buy fresh vegetables or plain, frozen vegetables. Buy low-sodium versions of canned vegetables, soups, and other canned goods. Prepare low-sodium meals · Use less salt each day when cooking. Reducing salt in this way will help you adjust to the taste. Do not add salt after cooking. Take the salt shaker off the table. · Flavor your food with garlic, lemon juice, onion, vinegar, herbs, and spices instead of salt. Do not use soy sauce, steak sauce, onion salt, garlic salt, mustard, or ketchup on your food. · Make your own salad dressings, sauces, and ketchup without adding salt. · Use less salt (or none) when recipes call for it. You can often use half the salt a recipe calls for without losing flavor. Other dishes like rice, pasta, and grains do not need added salt. · Rinse canned vegetables. This removes somebut not allof the salt.  
· Avoid water that has a naturally high sodium content or that has been treated with water softeners, which add sodium. Call your local water company to find out the sodium content of your water supply. If you buy bottled water, read the label and choose a sodium-free brand. Avoid high-sodium foods, such as: 
· Smoked, cured, salted, and canned meat, fish, and poultry. · Ham, waggoner, hot dogs, and luncheon meats. · Regular, hard, and processed cheese and regular peanut butter. · Crackers with salted tops. · Frozen prepared meals. · Canned and dried soups, broths, and bouillon, unless labeled sodium-free or low-sodium. · Canned vegetables, unless labeled sodium-free or low-sodium. · Salted snack foods such as chips and pretzels. · Western Mallory fries, pizza, tacos, and other fast foods. · Pickles, olives, ketchup, and other condiments, especially soy sauce, unless labeled sodium-free or low-sodium. If you cannot cook for yourself · Have family members or friends help you, or have someone cook low-sodium meals. · Check with your local senior nutrition program to find out where meals are served and whether they offer a low-sodium option. You can often find these programs through your local health department or hospital. 
· Have meals delivered to your home. Most [facility] have a Meals on SimplyBox. These programs provide one hot meal a day for older adults, delivered to their homes. Ask whether these meals are low-sodium. Let them know that you are on a low-sodium diet. Limiting fluid intake · Find a method that works for you. You might simply write down how much you drink every time you do. Some people keep a container filled with the amount of fluid allowed for that day. If they drink from a source other than the container, then they pour out that amount. · Measure your regular drinking glasses to find out how much fluid each one holds. Once you know this, you will not have to measure every time.  
· Besides water, milk, juices, and other drinks, some foods have a lot of fluid. Count any foods that will melt (such as ice cream or gelatin dessert) or liquid foods (such as soup) as part of your fluid intake for the day. Where can you learn more? Go to http://micaela-jozef.info/. Enter A166 in the search box to learn more about \"Limiting Sodium and Fluids With Heart Failure: Care Instructions. \" Current as of: November 15, 2016 Content Version: 11.2 © 8981-2462 [employer]. Care instructions adapted under license by [employer] (which disclaims liability or warranty for this information). If you have questions about a medical condition or this instruction, always ask your healthcare professional. [address] any warranty or liability for your use of this information. Learning About Heart Failure Zones What are heart failure zones? Heart failure zones give you an easy way to see changes in your heart failure symptoms. They also tell you when you need to get help. Check every day to see which zone you are in. Green zone. You are doing well. This is where you want to be. · Your weight is stable. This means it is not going up or down. · You breathe easily. · You are sleeping well. You are able to lie flat without shortness of breath. · You can do your usual activities. Yellow zone. Be careful. Your symptoms are changing. Call your doctor. · You have new or increased shortness of breath. · You are dizzy or lightheaded, or you feel like you may faint. · You have sudden weight gain, such as 3 pounds or more in 2 to 3 days. · You have increased swelling in your legs, ankles, or feet. · You are so tired or weak that you cannot do your usual activities. · You are not sleeping well. Shortness of breath wakes you up at night. You need extra pillows. Your doctor's name: ____________________________________________________________ Your doctor's contact information: _________________________________________________ Red zone. This is an emergency. Call 911. You have symptoms of sudden heart failure, such as: 
· You have severe trouble breathing. · You cough up pink, foamy mucus. · You have a new irregular or fast heartbeat. You have symptoms of a heart attack. These may include: · Chest pain or pressure, or a strange feeling in the chest. 
· Sweating. · Shortness of breath. · Nausea or vomiting. · Pain, pressure, or a strange feeling in the back, neck, jaw, or upper belly or in one or both shoulders or arms. · Lightheadedness or sudden weakness. · A fast or irregular heartbeat. If you have symptoms of a heart attack: After you call 911, the  may tell you to chew 1 adult-strength or 2 to 4 low-dose aspirin. Wait for an ambulance. Do not try to drive yourself. Follow-up care is a key part of your treatment and safety. Be sure to make and go to all appointments, and call your doctor if you are having problems. It's also a good idea to know your test results and keep a list of the medicines you take. Where can you learn more? Go to http://micaela-jozef.info/. Enter T174 in the search box to learn more about \"Learning About Heart Failure Zones. \" Current as of: January 27, 2016 Content Version: 11.2 © 2884-6085 [employer]. Care instructions adapted under license by [employer] (which disclaims liability or warranty for this information). If you have questions about a medical condition or this instruction, always ask your healthcare professional. [address] any warranty or liability for your use of this information.

## 2017-04-08 ENCOUNTER — APPOINTMENT (OUTPATIENT)
Dept: ULTRASOUND IMAGING | Age: 72
DRG: 291 | End: 2017-04-08
Attending: INTERNAL MEDICINE
Payer: MEDICARE

## 2017-04-08 LAB
ALBUMIN SERPL BCP-MCNC: 2.3 G/DL (ref 3.5–5)
ALBUMIN SERPL BCP-MCNC: 2.6 G/DL (ref 3.5–5)
ALBUMIN SERPL BCP-MCNC: 2.6 G/DL (ref 3.5–5)
ALBUMIN/GLOB SERPL: 0.8 {RATIO} (ref 1.1–2.2)
ALBUMIN/GLOB SERPL: 0.9 {RATIO} (ref 1.1–2.2)
ALP SERPL-CCNC: 66 U/L (ref 45–117)
ALP SERPL-CCNC: 68 U/L (ref 45–117)
ALT SERPL-CCNC: 83 U/L (ref 12–78)
ALT SERPL-CCNC: 91 U/L (ref 12–78)
ANION GAP BLD CALC-SCNC: 10 MMOL/L (ref 5–15)
ANION GAP BLD CALC-SCNC: 10 MMOL/L (ref 5–15)
ANION GAP BLD CALC-SCNC: 8 MMOL/L (ref 5–15)
AST SERPL W P-5'-P-CCNC: 53 U/L (ref 15–37)
AST SERPL W P-5'-P-CCNC: 58 U/L (ref 15–37)
ATRIAL RATE: 61 BPM
BASOPHILS # BLD AUTO: 0 K/UL (ref 0–0.1)
BASOPHILS # BLD AUTO: 0 K/UL (ref 0–0.1)
BASOPHILS # BLD: 0 % (ref 0–1)
BASOPHILS # BLD: 0 % (ref 0–1)
BILIRUB SERPL-MCNC: 0.5 MG/DL (ref 0.2–1)
BILIRUB SERPL-MCNC: 0.6 MG/DL (ref 0.2–1)
BUN SERPL-MCNC: 39 MG/DL (ref 6–20)
BUN SERPL-MCNC: 43 MG/DL (ref 6–20)
BUN SERPL-MCNC: 45 MG/DL (ref 6–20)
BUN/CREAT SERPL: 33 (ref 12–20)
BUN/CREAT SERPL: 36 (ref 12–20)
BUN/CREAT SERPL: 38 (ref 12–20)
CALCIUM SERPL-MCNC: 7.8 MG/DL (ref 8.5–10.1)
CALCIUM SERPL-MCNC: 8.3 MG/DL (ref 8.5–10.1)
CALCIUM SERPL-MCNC: 8.5 MG/DL (ref 8.5–10.1)
CALCULATED P AXIS, ECG09: 92 DEGREES
CALCULATED R AXIS, ECG10: 0 DEGREES
CALCULATED T AXIS, ECG11: 93 DEGREES
CHLORIDE SERPL-SCNC: 104 MMOL/L (ref 97–108)
CHLORIDE SERPL-SCNC: 104 MMOL/L (ref 97–108)
CHLORIDE SERPL-SCNC: 105 MMOL/L (ref 97–108)
CO2 SERPL-SCNC: 30 MMOL/L (ref 21–32)
CO2 SERPL-SCNC: 30 MMOL/L (ref 21–32)
CO2 SERPL-SCNC: 31 MMOL/L (ref 21–32)
CREAT SERPL-MCNC: 1.04 MG/DL (ref 0.7–1.3)
CREAT SERPL-MCNC: 1.19 MG/DL (ref 0.7–1.3)
CREAT SERPL-MCNC: 1.36 MG/DL (ref 0.7–1.3)
DIAGNOSIS, 93000: NORMAL
EOSINOPHIL # BLD: 0 K/UL (ref 0–0.4)
EOSINOPHIL # BLD: 0 K/UL (ref 0–0.4)
EOSINOPHIL NFR BLD: 0 % (ref 0–7)
EOSINOPHIL NFR BLD: 0 % (ref 0–7)
ERYTHROCYTE [DISTWIDTH] IN BLOOD BY AUTOMATED COUNT: 15 % (ref 11.5–14.5)
ERYTHROCYTE [DISTWIDTH] IN BLOOD BY AUTOMATED COUNT: 15.1 % (ref 11.5–14.5)
GLOBULIN SER CALC-MCNC: 2.9 G/DL (ref 2–4)
GLOBULIN SER CALC-MCNC: 3 G/DL (ref 2–4)
GLUCOSE BLD STRIP.AUTO-MCNC: 111 MG/DL (ref 65–100)
GLUCOSE BLD STRIP.AUTO-MCNC: 132 MG/DL (ref 65–100)
GLUCOSE BLD STRIP.AUTO-MCNC: 138 MG/DL (ref 65–100)
GLUCOSE BLD STRIP.AUTO-MCNC: 39 MG/DL (ref 65–100)
GLUCOSE BLD STRIP.AUTO-MCNC: 44 MG/DL (ref 65–100)
GLUCOSE BLD STRIP.AUTO-MCNC: 46 MG/DL (ref 65–100)
GLUCOSE BLD STRIP.AUTO-MCNC: 65 MG/DL (ref 65–100)
GLUCOSE SERPL-MCNC: 165 MG/DL (ref 65–100)
GLUCOSE SERPL-MCNC: 50 MG/DL (ref 65–100)
GLUCOSE SERPL-MCNC: 51 MG/DL (ref 65–100)
HCT VFR BLD AUTO: 30.9 % (ref 36.6–50.3)
HCT VFR BLD AUTO: 31.2 % (ref 36.6–50.3)
HGB BLD-MCNC: 10 G/DL (ref 12.1–17)
HGB BLD-MCNC: 10 G/DL (ref 12.1–17)
LACTATE SERPL-SCNC: 1.7 MMOL/L (ref 0.4–2)
LYMPHOCYTES # BLD AUTO: 11 % (ref 12–49)
LYMPHOCYTES # BLD AUTO: 13 % (ref 12–49)
LYMPHOCYTES # BLD: 0.8 K/UL (ref 0.8–3.5)
LYMPHOCYTES # BLD: 1 K/UL (ref 0.8–3.5)
MCH RBC QN AUTO: 30.4 PG (ref 26–34)
MCH RBC QN AUTO: 31 PG (ref 26–34)
MCHC RBC AUTO-ENTMCNC: 32.1 G/DL (ref 30–36.5)
MCHC RBC AUTO-ENTMCNC: 32.4 G/DL (ref 30–36.5)
MCV RBC AUTO: 94.8 FL (ref 80–99)
MCV RBC AUTO: 95.7 FL (ref 80–99)
MONOCYTES # BLD: 0.7 K/UL (ref 0–1)
MONOCYTES # BLD: 0.9 K/UL (ref 0–1)
MONOCYTES NFR BLD AUTO: 10 % (ref 5–13)
MONOCYTES NFR BLD AUTO: 12 % (ref 5–13)
NEUTS SEG # BLD: 5.6 K/UL (ref 1.8–8)
NEUTS SEG # BLD: 5.7 K/UL (ref 1.8–8)
NEUTS SEG NFR BLD AUTO: 75 % (ref 32–75)
NEUTS SEG NFR BLD AUTO: 79 % (ref 32–75)
P-R INTERVAL, ECG05: 160 MS
PHOSPHATE SERPL-MCNC: 4.5 MG/DL (ref 2.6–4.7)
PLATELET # BLD AUTO: 168 K/UL (ref 150–400)
PLATELET # BLD AUTO: 180 K/UL (ref 150–400)
POTASSIUM SERPL-SCNC: 4.1 MMOL/L (ref 3.5–5.1)
POTASSIUM SERPL-SCNC: 4.6 MMOL/L (ref 3.5–5.1)
POTASSIUM SERPL-SCNC: 5.1 MMOL/L (ref 3.5–5.1)
PROT SERPL-MCNC: 5.3 G/DL (ref 6.4–8.2)
PROT SERPL-MCNC: 5.5 G/DL (ref 6.4–8.2)
Q-T INTERVAL, ECG07: 528 MS
QRS DURATION, ECG06: 18 MS
QTC CALCULATION (BEZET), ECG08: 531 MS
RBC # BLD AUTO: 3.23 M/UL (ref 4.1–5.7)
RBC # BLD AUTO: 3.29 M/UL (ref 4.1–5.7)
SERVICE CMNT-IMP: ABNORMAL
SERVICE CMNT-IMP: NORMAL
SODIUM SERPL-SCNC: 144 MMOL/L (ref 136–145)
VENTRICULAR RATE, ECG03: 61 BPM
WBC # BLD AUTO: 7.1 K/UL (ref 4.1–11.1)
WBC # BLD AUTO: 7.6 K/UL (ref 4.1–11.1)

## 2017-04-08 PROCEDURE — 77010033678 HC OXYGEN DAILY

## 2017-04-08 PROCEDURE — 76770 US EXAM ABDO BACK WALL COMP: CPT

## 2017-04-08 PROCEDURE — 85025 COMPLETE CBC W/AUTO DIFF WBC: CPT | Performed by: INTERNAL MEDICINE

## 2017-04-08 PROCEDURE — 74011250636 HC RX REV CODE- 250/636: Performed by: INTERNAL MEDICINE

## 2017-04-08 PROCEDURE — 36415 COLL VENOUS BLD VENIPUNCTURE: CPT | Performed by: INTERNAL MEDICINE

## 2017-04-08 PROCEDURE — 74011250637 HC RX REV CODE- 250/637: Performed by: INTERNAL MEDICINE

## 2017-04-08 PROCEDURE — 74011000250 HC RX REV CODE- 250: Performed by: INTERNAL MEDICINE

## 2017-04-08 PROCEDURE — 80053 COMPREHEN METABOLIC PANEL: CPT | Performed by: INTERNAL MEDICINE

## 2017-04-08 PROCEDURE — 65660000000 HC RM CCU STEPDOWN

## 2017-04-08 PROCEDURE — 82962 GLUCOSE BLOOD TEST: CPT

## 2017-04-08 PROCEDURE — 83605 ASSAY OF LACTIC ACID: CPT | Performed by: INTERNAL MEDICINE

## 2017-04-08 PROCEDURE — 80069 RENAL FUNCTION PANEL: CPT | Performed by: INTERNAL MEDICINE

## 2017-04-08 RX ORDER — ONDANSETRON 2 MG/ML
4 INJECTION INTRAMUSCULAR; INTRAVENOUS
Status: DISCONTINUED | OUTPATIENT
Start: 2017-04-08 | End: 2017-04-11 | Stop reason: HOSPADM

## 2017-04-08 RX ORDER — ACETAMINOPHEN 325 MG/1
650 TABLET ORAL
Status: DISCONTINUED | OUTPATIENT
Start: 2017-04-08 | End: 2017-04-11 | Stop reason: HOSPADM

## 2017-04-08 RX ORDER — FUROSEMIDE 10 MG/ML
40 INJECTION INTRAMUSCULAR; INTRAVENOUS DAILY
Status: DISCONTINUED | OUTPATIENT
Start: 2017-04-08 | End: 2017-04-09

## 2017-04-08 RX ORDER — GUAIFENESIN 100 MG/5ML
81 LIQUID (ML) ORAL DAILY
Status: DISCONTINUED | OUTPATIENT
Start: 2017-04-08 | End: 2017-04-11 | Stop reason: HOSPADM

## 2017-04-08 RX ORDER — AMIODARONE HYDROCHLORIDE 200 MG/1
200 TABLET ORAL DAILY
Status: DISCONTINUED | OUTPATIENT
Start: 2017-04-08 | End: 2017-04-11 | Stop reason: HOSPADM

## 2017-04-08 RX ORDER — INSULIN LISPRO 100 [IU]/ML
INJECTION, SOLUTION INTRAVENOUS; SUBCUTANEOUS
Status: DISCONTINUED | OUTPATIENT
Start: 2017-04-08 | End: 2017-04-11 | Stop reason: HOSPADM

## 2017-04-08 RX ORDER — SODIUM CHLORIDE 0.9 % (FLUSH) 0.9 %
5-10 SYRINGE (ML) INJECTION AS NEEDED
Status: DISCONTINUED | OUTPATIENT
Start: 2017-04-08 | End: 2017-04-11 | Stop reason: HOSPADM

## 2017-04-08 RX ORDER — FUROSEMIDE 10 MG/ML
40 INJECTION INTRAMUSCULAR; INTRAVENOUS DAILY
Status: DISCONTINUED | OUTPATIENT
Start: 2017-04-09 | End: 2017-04-08

## 2017-04-08 RX ORDER — QUINIDINE GLUCONATE 324 MG/1
324 TABLET, EXTENDED RELEASE ORAL DAILY
Status: DISCONTINUED | OUTPATIENT
Start: 2017-04-09 | End: 2017-04-11 | Stop reason: HOSPADM

## 2017-04-08 RX ORDER — DEXTROSE 50 % IN WATER (D50W) INTRAVENOUS SYRINGE
12.5-25 AS NEEDED
Status: DISCONTINUED | OUTPATIENT
Start: 2017-04-08 | End: 2017-04-11 | Stop reason: HOSPADM

## 2017-04-08 RX ORDER — MEXILETINE HYDROCHLORIDE 150 MG/1
150 CAPSULE ORAL 2 TIMES DAILY
Status: DISCONTINUED | OUTPATIENT
Start: 2017-04-08 | End: 2017-04-11 | Stop reason: HOSPADM

## 2017-04-08 RX ORDER — MAGNESIUM SULFATE 100 %
4 CRYSTALS MISCELLANEOUS AS NEEDED
Status: DISCONTINUED | OUTPATIENT
Start: 2017-04-08 | End: 2017-04-11 | Stop reason: HOSPADM

## 2017-04-08 RX ORDER — HEPARIN SODIUM 5000 [USP'U]/ML
5000 INJECTION, SOLUTION INTRAVENOUS; SUBCUTANEOUS EVERY 8 HOURS
Status: DISCONTINUED | OUTPATIENT
Start: 2017-04-08 | End: 2017-04-11 | Stop reason: HOSPADM

## 2017-04-08 RX ORDER — SODIUM CHLORIDE 0.9 % (FLUSH) 0.9 %
5-10 SYRINGE (ML) INJECTION EVERY 8 HOURS
Status: DISCONTINUED | OUTPATIENT
Start: 2017-04-08 | End: 2017-04-11 | Stop reason: HOSPADM

## 2017-04-08 RX ADMIN — FUROSEMIDE 40 MG: 10 INJECTION, SOLUTION INTRAMUSCULAR; INTRAVENOUS at 12:00

## 2017-04-08 RX ADMIN — HEPARIN SODIUM 5000 UNITS: 5000 INJECTION, SOLUTION INTRAVENOUS; SUBCUTANEOUS at 11:03

## 2017-04-08 RX ADMIN — HEPARIN SODIUM 5000 UNITS: 5000 INJECTION, SOLUTION INTRAVENOUS; SUBCUTANEOUS at 21:07

## 2017-04-08 RX ADMIN — Medication 5 ML: at 18:54

## 2017-04-08 RX ADMIN — AMIODARONE HYDROCHLORIDE 200 MG: 200 TABLET ORAL at 11:03

## 2017-04-08 RX ADMIN — DEXTROSE MONOHYDRATE 25 G: 25 INJECTION, SOLUTION INTRAVENOUS at 10:15

## 2017-04-08 RX ADMIN — Medication 10 ML: at 21:07

## 2017-04-08 RX ADMIN — ASPIRIN 81 MG CHEWABLE TABLET 81 MG: 81 TABLET CHEWABLE at 11:03

## 2017-04-08 RX ADMIN — Medication 5 ML: at 11:04

## 2017-04-08 RX ADMIN — MEXILETINE HYDROCHLORIDE 150 MG: 150 CAPSULE ORAL at 18:54

## 2017-04-08 NOTE — ED NOTES
TRANSFER - OUT REPORT:    Verbal report given to Francesca Lane on Ambika Wiggins  being transferred to [facility]([id number]) for routine progression of care       Report consisted of patients Situation, Background, Assessment and   Recommendations(SBAR). Information from the following report(s) SBAR, ED Summary, MAR, Recent Results and Cardiac Rhythm V-Paced was reviewed with the receiving nurse. Lines:   Peripheral IV 04/07/17 Right Wrist (Active)   Site Assessment Clean, dry, & intact 4/7/2017  6:00 PM   Phlebitis Assessment 0 4/7/2017  6:00 PM   Infiltration Assessment 0 4/7/2017  6:00 PM   Dressing Status Clean, dry, & intact 4/7/2017  6:00 PM   Dressing Type Transparent 4/7/2017  6:00 PM   Hub Color/Line Status Pink 4/7/2017  6:00 PM        Opportunity for questions and clarification was provided.       Patient transported with:   Monitor  O2 @ 4 liters  Registered Nurse

## 2017-04-08 NOTE — CARDIO/PULMONARY
Cardiopulmonary Rehab:      Chart reviewed. Pt is a 66 y.o. male admitted with acute respiratory failure with hypoxia due to systolic CHF. Pt with AICD. PMH includes CKD III, HTN, DM, CAD, and former smoker. ECHO indicated LVEF 20-25%. Pt last visited for CHF teaching during recent hospitalization 3/16/17 and was on CHF bundle list then. Pt visited. Pt received the CHF teaching packet at a prior admission. Provided printed teaching materials re: \"Heart Failure: Self Care; Avoiding Triggers with Heart Failure and Limiting Sodium and Fluids with Heart Failure. \" Reviewed the low sodium diet, checking labels for sodium in canned, packaged and processed foods, CHF S&Ss, when to call the doctor and the benefits of daily weights. Inquired if pt was following a low sodium diet. Pt admits adding salt to his meals stating \"without it \"my foods will be bland. Reminded to try herbs and spices to help flavour meals and reviewed low sodium handout. Pt owns a scale however does not weigh because it needs a battery. Pt correctly identified name and purpose of his diuretic. Pt verbalized understanding. Will follow.

## 2017-04-08 NOTE — CONSULTS
Nephrology Progress Note  Nirali Leung  Date of Admission : 4/7/2017    CC: Follow up for LANDEN and hyperkalemia       Assessment and Plan     Hyperkalemia:  - 2/2 mild LANDEN vs excess intake - was taking more than his prescribed KCl at home  - resolved with medical therapy  - discussed compliance with his medications    LANDEN on CKD:  - appears to have CKD III at baseline  - no obstruction  - does not appear to be CRS  - dobutamine off  - Cr stable    CKD III:  - 2/2 DM2 and HTN  - Cr at baseline, will follow    CHF EF 20-25%:  - off dobutamine  - lasix 40mg IV daily for now    HTN:  - BP stable    Afib:  - on oral amio       Interval History:  Seen and examined. Feeling ok today. K down to normal.  U/S results noted and normal.  Pineda in place. UOP stable after lasix. No cp, sob, n/v/d reported at this time. Current Medications: all current  Medications have been eviewed in EPIC  Review of Systems: Pertinent items are noted in HPI.     Objective:  Vitals:    Vitals:    04/08/17 1044 04/08/17 1100 04/08/17 1400 04/08/17 1407   BP:  121/63 93/55 94/57   Pulse: 74 77 81 82   Resp:       Temp:       SpO2: 100% 98%     Weight:       Height:         Intake and Output:  04/08 0701 - 04/08 1900  In: 426.2 [P.O.:360; I.V.:66.2]  Out: 1575 [Urine:1575]  04/06 1901 - 04/08 0700  In: 66.5 [I.V.:66.5]  Out: -     Physical Examination:  General: NAD,Conversant   Neck:  Supple, no mass  Resp:  Lungs CTA B/L, no wheezing , normal respiratory effort  CV:  RRR,  no murmur or rub, no LE edema  GI:  Soft, NT, + Bowel sounds, no hepatosplenomegaly  Neurologic:  Non focal  Psych:             AAO x 3 appropriate affect   Skin:  No Rash  :  Pineda in place    []    High complexity decision making was performed  []    Patient is at high-risk of decompensation with multiple organ involvement    Lab Data Personally Reviewed: I have reviewed all the pertinent labs, microbiology data and radiology studies during assessment. Recent Labs      04/08/17   1027  04/08/17   0430  04/08/17   0219   04/07/17   1750   NA  144  144  144   < >  138   K  4.1  4.6  5.1   < >  6.9*   CL  105  104  104   < >  101   CO2  31  30  30   < >  30   GLU  165*  50*  51*   < >  184*   BUN  39*  43*  45*   < >  52*   CREA  1.04  1.19  1.36*   < >  1.61*   CA  7.8*  8.3*  8.5   < >  8.6   PHOS   --    --   4.5   --    --    ALB  2.3*  2.6*  2.6*   --   2.8*   SGOT  53*  58*   --    --   75*   ALT  83*  91*   --    --   103*    < > = values in this interval not displayed. Recent Labs      04/08/17   1027  04/08/17   0430  04/07/17   1635   WBC  7.1  7.6  8.3   HGB  10.0*  10.0*  12.0*   HCT  31.2*  30.9*  38.1   PLT  168  180  229     No results found for: SDES  No results found for: CULT  Recent Results (from the past 24 hour(s))   EKG, 12 LEAD, INITIAL    Collection Time: 04/07/17  4:08 PM   Result Value Ref Range    Ventricular Rate 61 BPM    Atrial Rate 61 BPM    P-R Interval 160 ms    QRS Duration 18 ms    Q-T Interval 528 ms    QTC Calculation (Bezet) 531 ms    Calculated P Axis 92 degrees    Calculated R Axis 0 degrees    Calculated T Axis 93 degrees    Diagnosis       Atrial-paced rhythm with occasional ventricular-paced complexes  Indeterminate axis  Pulmonary disease pattern  ST elevation, consider anterolateral injury or acute infarct  ST elevation, consider inferior injury or acute infarct  Prolonged QT  ** ** ACUTE MI / STEMI ** **  Consider right ventricular involvement in acute inferior infarct  Abnormal ECG  When compared with ECG of 13-MAR-2017 15:24,  Vent.  rate has decreased BY   6 BPM     CBC WITH AUTOMATED DIFF    Collection Time: 04/07/17  4:35 PM   Result Value Ref Range    WBC 8.3 4.1 - 11.1 K/uL    RBC 3.89 (L) 4.10 - 5.70 M/uL    HGB 12.0 (L) 12.1 - 17.0 g/dL    HCT 38.1 36.6 - 50.3 %    MCV 97.9 80.0 - 99.0 FL    MCH 30.8 26.0 - 34.0 PG    MCHC 31.5 30.0 - 36.5 g/dL    RDW 15.1 (H) 11.5 - 14.5 %    PLATELET 597 717 - 400 K/uL    NEUTROPHILS 82 (H) 32 - 75 %    LYMPHOCYTES 8 (L) 12 - 49 %    MONOCYTES 10 5 - 13 %    EOSINOPHILS 0 0 - 7 %    BASOPHILS 0 0 - 1 %    ABS. NEUTROPHILS 6.8 1.8 - 8.0 K/UL    ABS. LYMPHOCYTES 0.7 (L) 0.8 - 3.5 K/UL    ABS. MONOCYTES 0.8 0.0 - 1.0 K/UL    ABS. EOSINOPHILS 0.0 0.0 - 0.4 K/UL    ABS. BASOPHILS 0.0 0.0 - 0.1 K/UL    RBC COMMENTS MACROCYTOSIS  1+       METABOLIC PANEL, COMPREHENSIVE    Collection Time: 04/07/17  4:35 PM   Result Value Ref Range    Sodium 137 136 - 145 mmol/L    Potassium HEMOLYZED,RECOLLECT REQUESTED 3.5 - 5.1 mmol/L    Chloride 102 97 - 108 mmol/L    CO2 24 21 - 32 mmol/L    Anion gap 11 5 - 15 mmol/L    Glucose 187 (H) 65 - 100 mg/dL    BUN 51 (H) 6 - 20 MG/DL    Creatinine 1.62 (H) 0.70 - 1.30 MG/DL    BUN/Creatinine ratio 31 (H) 12 - 20      GFR est AA 51 (L) >60 ml/min/1.73m2    GFR est non-AA 42 (L) >60 ml/min/1.73m2    Calcium 8.6 8.5 - 10.1 MG/DL    Bilirubin, total 0.6 0.2 - 1.0 MG/DL    ALT (SGPT) 119 (H) 12 - 78 U/L    AST (SGOT) 109 (H) 15 - 37 U/L    Alk.  phosphatase 91 45 - 117 U/L    Protein, total 7.1 6.4 - 8.2 g/dL    Albumin 3.0 (L) 3.5 - 5.0 g/dL    Globulin 4.1 (H) 2.0 - 4.0 g/dL    A-G Ratio 0.7 (L) 1.1 - 2.2     CK W/ CKMB & INDEX    Collection Time: 04/07/17  4:35 PM   Result Value Ref Range     39 - 308 U/L    CK - MB 3.2 <3.6 NG/ML    CK-MB Index 2.3 0 - 2.5     TROPONIN I    Collection Time: 04/07/17  4:35 PM   Result Value Ref Range    Troponin-I, Qt. <0.04 <0.05 ng/mL   PRO-BNP    Collection Time: 04/07/17  4:35 PM   Result Value Ref Range    NT pro-BNP 31336 (H) 0 - 125 PG/ML   LACTIC ACID, PLASMA    Collection Time: 04/07/17  4:35 PM   Result Value Ref Range    Lactic acid 4.4 (HH) 0.4 - 2.0 MMOL/L   METABOLIC PANEL, COMPREHENSIVE    Collection Time: 04/07/17  5:50 PM   Result Value Ref Range    Sodium 138 136 - 145 mmol/L    Potassium 6.9 (HH) 3.5 - 5.1 mmol/L    Chloride 101 97 - 108 mmol/L    CO2 30 21 - 32 mmol/L    Anion gap 7 5 - 15 mmol/L    Glucose 184 (H) 65 - 100 mg/dL    BUN 52 (H) 6 - 20 MG/DL    Creatinine 1.61 (H) 0.70 - 1.30 MG/DL    BUN/Creatinine ratio 32 (H) 12 - 20      GFR est AA 51 (L) >60 ml/min/1.73m2    GFR est non-AA 42 (L) >60 ml/min/1.73m2    Calcium 8.6 8.5 - 10.1 MG/DL    Bilirubin, total 0.4 0.2 - 1.0 MG/DL    ALT (SGPT) 103 (H) 12 - 78 U/L    AST (SGOT) 75 (H) 15 - 37 U/L    Alk.  phosphatase 80 45 - 117 U/L    Protein, total 6.1 (L) 6.4 - 8.2 g/dL    Albumin 2.8 (L) 3.5 - 5.0 g/dL    Globulin 3.3 2.0 - 4.0 g/dL    A-G Ratio 0.8 (L) 1.1 - 2.2     BLOOD GAS, ARTERIAL    Collection Time: 04/07/17  7:00 PM   Result Value Ref Range    pH 7.37 7.35 - 7.45      PCO2 44 35.0 - 45.0 mmHg    PO2 105 (H) 80 - 100 mmHg    O2 SAT 98 (H) 92 - 97 %    BICARBONATE 25 22 - 26 mmol/L    BASE DEFICIT 0.6 mmol/L    O2 METHOD ROOM AIR      MODE OTHER      Sample source ARTERIAL      SITE RIGHT RADIAL      RAD'S TEST YES     GLUCOSE, POC    Collection Time: 04/07/17  8:02 PM   Result Value Ref Range    Glucose (POC) 152 (H) 65 - 100 mg/dL    Performed by [facility]    METABOLIC PANEL, BASIC    Collection Time: 04/07/17  9:42 PM   Result Value Ref Range    Sodium 141 136 - 145 mmol/L    Potassium 5.9 (H) 3.5 - 5.1 mmol/L    Chloride 104 97 - 108 mmol/L    CO2 27 21 - 32 mmol/L    Anion gap 10 5 - 15 mmol/L    Glucose 208 (H) 65 - 100 mg/dL    BUN 51 (H) 6 - 20 MG/DL    Creatinine 1.82 (H) 0.70 - 1.30 MG/DL    BUN/Creatinine ratio 28 (H) 12 - 20      GFR est AA 45 (L) >60 ml/min/1.73m2    GFR est non-AA 37 (L) >60 ml/min/1.73m2    Calcium 9.2 8.5 - 10.1 MG/DL   LACTIC ACID, PLASMA    Collection Time: 04/07/17  9:42 PM   Result Value Ref Range    Lactic acid 7.5 (HH) 0.4 - 2.0 MMOL/L   RENAL FUNCTION PANEL    Collection Time: 04/08/17  2:19 AM   Result Value Ref Range    Sodium 144 136 - 145 mmol/L    Potassium 5.1 3.5 - 5.1 mmol/L    Chloride 104 97 - 108 mmol/L    CO2 30 21 - 32 mmol/L    Anion gap 10 5 - 15 mmol/L    Glucose 51 (L) 65 - 100 mg/dL    BUN 45 (H) 6 - 20 MG/DL    Creatinine 1.36 (H) 0.70 - 1.30 MG/DL    BUN/Creatinine ratio 33 (H) 12 - 20      GFR est AA >60 >60 ml/min/1.73m2    GFR est non-AA 52 (L) >60 ml/min/1.73m2    Calcium 8.5 8.5 - 10.1 MG/DL    Phosphorus 4.5 2.6 - 4.7 MG/DL    Albumin 2.6 (L) 3.5 - 5.0 g/dL   LACTIC ACID, PLASMA    Collection Time: 04/08/17  4:30 AM   Result Value Ref Range    Lactic acid 1.7 0.4 - 2.0 MMOL/L   CBC WITH AUTOMATED DIFF    Collection Time: 04/08/17  4:30 AM   Result Value Ref Range    WBC 7.6 4.1 - 11.1 K/uL    RBC 3.23 (L) 4.10 - 5.70 M/uL    HGB 10.0 (L) 12.1 - 17.0 g/dL    HCT 30.9 (L) 36.6 - 50.3 %    MCV 95.7 80.0 - 99.0 FL    MCH 31.0 26.0 - 34.0 PG    MCHC 32.4 30.0 - 36.5 g/dL    RDW 15.0 (H) 11.5 - 14.5 %    PLATELET 590 897 - 836 K/uL    NEUTROPHILS 75 32 - 75 %    LYMPHOCYTES 13 12 - 49 %    MONOCYTES 12 5 - 13 %    EOSINOPHILS 0 0 - 7 %    BASOPHILS 0 0 - 1 %    ABS. NEUTROPHILS 5.7 1.8 - 8.0 K/UL    ABS. LYMPHOCYTES 1.0 0.8 - 3.5 K/UL    ABS. MONOCYTES 0.9 0.0 - 1.0 K/UL    ABS. EOSINOPHILS 0.0 0.0 - 0.4 K/UL    ABS. BASOPHILS 0.0 0.0 - 0.1 K/UL   METABOLIC PANEL, COMPREHENSIVE    Collection Time: 04/08/17  4:30 AM   Result Value Ref Range    Sodium 144 136 - 145 mmol/L    Potassium 4.6 3.5 - 5.1 mmol/L    Chloride 104 97 - 108 mmol/L    CO2 30 21 - 32 mmol/L    Anion gap 10 5 - 15 mmol/L    Glucose 50 (L) 65 - 100 mg/dL    BUN 43 (H) 6 - 20 MG/DL    Creatinine 1.19 0.70 - 1.30 MG/DL    BUN/Creatinine ratio 36 (H) 12 - 20      GFR est AA >60 >60 ml/min/1.73m2    GFR est non-AA >60 >60 ml/min/1.73m2    Calcium 8.3 (L) 8.5 - 10.1 MG/DL    Bilirubin, total 0.6 0.2 - 1.0 MG/DL    ALT (SGPT) 91 (H) 12 - 78 U/L    AST (SGOT) 58 (H) 15 - 37 U/L    Alk.  phosphatase 68 45 - 117 U/L    Protein, total 5.5 (L) 6.4 - 8.2 g/dL    Albumin 2.6 (L) 3.5 - 5.0 g/dL    Globulin 2.9 2.0 - 4.0 g/dL    A-G Ratio 0.9 (L) 1.1 - 2.2     GLUCOSE, POC    Collection Time: 04/08/17 10:12 AM   Result Value Ref Range    Glucose (POC) 44 (LL) 65 - 100 mg/dL    Performed by [facility] [address], POC    Collection Time: 04/08/17 10:13 AM   Result Value Ref Range    Glucose (POC) 46 (LL) 65 - 100 mg/dL    Performed by Ady0 Sister Caprice [facility]    Collection Time: 04/08/17 10:27 AM   Result Value Ref Range    Sodium 144 136 - 145 mmol/L    Potassium 4.1 3.5 - 5.1 mmol/L    Chloride 105 97 - 108 mmol/L    CO2 31 21 - 32 mmol/L    Anion gap 8 5 - 15 mmol/L    Glucose 165 (H) 65 - 100 mg/dL    BUN 39 (H) 6 - 20 MG/DL    Creatinine 1.04 0.70 - 1.30 MG/DL    BUN/Creatinine ratio 38 (H) 12 - 20      GFR est AA >60 >60 ml/min/1.73m2    GFR est non-AA >60 >60 ml/min/1.73m2    Calcium 7.8 (L) 8.5 - 10.1 MG/DL    Bilirubin, total 0.5 0.2 - 1.0 MG/DL    ALT (SGPT) 83 (H) 12 - 78 U/L    AST (SGOT) 53 (H) 15 - 37 U/L    Alk. phosphatase 66 45 - 117 U/L    Protein, total 5.3 (L) 6.4 - 8.2 g/dL    Albumin 2.3 (L) 3.5 - 5.0 g/dL    Globulin 3.0 2.0 - 4.0 g/dL    A-G Ratio 0.8 (L) 1.1 - 2.2     CBC WITH AUTOMATED DIFF    Collection Time: 04/08/17 10:27 AM   Result Value Ref Range    WBC 7.1 4.1 - 11.1 K/uL    RBC 3.29 (L) 4.10 - 5.70 M/uL    HGB 10.0 (L) 12.1 - 17.0 g/dL    HCT 31.2 (L) 36.6 - 50.3 %    MCV 94.8 80.0 - 99.0 FL    MCH 30.4 26.0 - 34.0 PG    MCHC 32.1 30.0 - 36.5 g/dL    RDW 15.1 (H) 11.5 - 14.5 %    PLATELET 605 333 - 239 K/uL    NEUTROPHILS 79 (H) 32 - 75 %    LYMPHOCYTES 11 (L) 12 - 49 %    MONOCYTES 10 5 - 13 %    EOSINOPHILS 0 0 - 7 %    BASOPHILS 0 0 - 1 %    ABS. NEUTROPHILS 5.6 1.8 - 8.0 K/UL    ABS. LYMPHOCYTES 0.8 0.8 - 3.5 K/UL    ABS. MONOCYTES 0.7 0.0 - 1.0 K/UL    ABS. EOSINOPHILS 0.0 0.0 - 0.4 K/UL    ABS.  BASOPHILS 0.0 0.0 - 0.1 K/UL   GLUCOSE, POC    Collection Time: 04/08/17 10:35 AM   Result Value Ref Range    Glucose (POC) 65 65 - 100 mg/dL    Performed by [facility], POC    Collection Time: 04/08/17 10:56 AM   Result Value Ref Range    Glucose (POC) 39 (LL) 65 - 100 mg/dL Performed by [facility], POC    Collection Time: 04/08/17 10:58 AM   Result Value Ref Range    Glucose (POC) 111 (H) 65 - 100 mg/dL    Performed by Cachorro Enamorado MD  [place] [id number] [address]  Phone - [phone]   Fax - [phone]  [email or web address]

## 2017-04-08 NOTE — PROGRESS NOTES
TRANSFER - IN REPORT:    Verbal report received from Nini Figueroa RN (name) on Leticia Guzman  being received from ED (unit) for routine progression of care      Report consisted of patients Situation, Background, Assessment and Recommendations(SBAR). Information from the following report(s) SBAR, Kardex, ED Summary, Intake/Output, MAR, Recent Results and Cardiac Rhythm Paced was reviewed with the receiving nurse. Opportunity for questions and clarification was provided. Assessment completed upon patients arrival to unit and care assumed. 9919: Pt received from ED. Pt A&O to self only. VSS on dobutamine gtt. Pt offers no complaints. Bed alarm in place. Call bell within reach. 0710: Bedside and Verbal shift change report given to [employer], RN (oncoming nurse) by Roland Echevarria RN (offgoing nurse). Report included the following information SBAR, Kardex, ED Summary, Intake/Output, MAR and Recent Results.

## 2017-04-08 NOTE — PROGRESS NOTES
Hospitalist Progress Note    NAME: Bfufy Salmon   :  1945   MRN:  [id number]       Interim Hospital Summary: 66 y.o. male whom presented on 2017 with      Assessment / Plan:  Acute respiratory failure with hypoxia POA  Due to Systolic CHF, acute on chronic (EF 20-25%) s/p AICD  -cont'IV dobutamine drip to allow diurising  -CXR with left pleural effusion, probnp ~11,000 with +2 pitting edema  -2D echo ordered  -continue dobutamine gtt, will try to wean today  -iv lasix 40mg, monitor lytes  -I&O  -cardiology following     LANDEN with baseline CKD3, improved  Hyperkalemia, resolved  -likely due to decompensated CHF  -s/p Kayexalate, Insulin/Dextrose, Calcium Gluconate  -nephro consulted in ED     Acute Encephalopathy POA with possible baseline dementia   -hx of hallucination for months  -mentation back to baseline  -ABG without CO2 retention     HTN (hypertension)   -holding BP meds due to BP issue     V-tach   -continue amiodarone        Code Status: Full  Surrogate Decision Maker: Brother Jabari Mom     DVT Prophylaxis: SQ heparin     Subjective:     Chief Complaint / Reason for Physician Visit  No acute complaints. Discussed with RN events overnight. Review of Systems:  Symptom Y/N Comments  Symptom Y/N Comments   Fever/Chills n   Chest Pain n    Poor Appetite n   Edema n    Cough n   Abdominal Pain n    Sputum n   Joint Pain n    SOB/HARRIS n   Pruritis/Rash     Nausea/vomit    Tolerating PT/OT     Diarrhea    Tolerating Diet     Constipation    Other       Could NOT obtain due to:      Objective:     VITALS:   Last 24hrs VS reviewed since prior progress note.  Most recent are:  Patient Vitals for the past 24 hrs:   Temp Pulse Resp BP SpO2   17 1044 - 74 - - 100 %   17 1038 - 72 - 132/62 100 %   17 1000 - 74 - 116/60 -   17 0903 - 80 - 132/63 -   17 0800 - 73 - 136/72 100 %   17 0707 97.4 °F (36.3 °C) 78 16 141/60 94 %   17 0615 - 73 15 121/62 97 %   04/08/17 0600 - 76 16 113/58 95 %   04/08/17 0545 - 74 17 120/60 91 %   04/08/17 0530 - 76 8 120/67 95 %   04/08/17 0515 - 73 17 118/60 100 %   04/08/17 0500 - 78 20 116/62 92 %   04/08/17 0445 - 78 19 112/49 95 %   04/08/17 0430 - 75 12 108/63 96 %   04/08/17 0415 - 77 20 118/59 97 %   04/08/17 0400 - 76 22 124/66 99 %   04/08/17 0345 - 74 16 110/66 100 %   04/08/17 0330 - 79 21 119/63 100 %   04/08/17 0315 - 78 24 128/66 100 %   04/08/17 0300 - 78 15 129/70 100 %   04/08/17 0115 - 73 18 112/61 100 %   04/08/17 0030 - 76 20 126/69 98 %   04/07/17 2345 - 75 19 134/73 98 %   04/07/17 2239 - 72 - 137/87 -   04/07/17 2222 - 71 21 - 100 %   04/07/17 2215 - 73 17 118/74 -   04/07/17 2200 - 73 19 117/80 -   04/07/17 2145 - 75 19 125/74 100 %   04/07/17 2130 - 67 25 128/55 (!) 85 %   04/07/17 2115 - 65 20 118/69 90 %   04/07/17 2100 - 63 20 119/69 92 %   04/07/17 2045 - 62 21 123/63 (!) 87 %   04/07/17 2030 - 60 20 120/64 (!) 85 %   04/07/17 2024 - 61 24 - (!) 83 %   04/07/17 1824 - 60 18 103/82 -   04/07/17 1600 96.9 °F (36.1 °C) 68 20 101/72 -   04/07/17 1551 - 64 16 - -   04/07/17 1550 - 64 18 113/62 -       Intake/Output Summary (Last 24 hours) at 04/08/17 1054  Last data filed at 04/08/17 0744   Gross per 24 hour   Intake                0 ml   Output              475 ml   Net             -475 ml        PHYSICAL EXAM:  General: WD, WN. Alert, cooperative, no acute distress    EENT:  EOMI. Anicteric sclerae. MMM  Resp:  Crackles at lung base, no wheezing. No accessory muscle use  CV:  Regular  rhythm,  +2 edema bl LE  GI:  Soft, Non distended, Non tender.  +Bowel sounds  Neurologic:  Alert and oriented X 3, normal speech,   Psych:   Good insight. Not anxious nor agitated  Skin:  No rashes.   No jaundice    Reviewed most current lab test results and cultures  YES  Reviewed most current radiology test results   YES  Review and summation of old records today    NO  Reviewed patient's current orders and [facility] YES  PMH/SH reviewed - no change compared to H&P  ________________________________________________________________________  Care Plan discussed with:    Comments   Patient x    Family      RN x    Care Manager     Consultant  x cardiology                     Multidiciplinary team rounds were held today with , nursing, pharmacist and clinical coordinator. Patient's plan of care was discussed; medications were reviewed and discharge planning was addressed. ________________________________________________________________________  Total NON critical care TIME:  45   Minutes    Total CRITICAL CARE TIME Spent:   Minutes non procedure based      Comments   >50% of visit spent in counseling and coordination of care     ________________________________________________________________________  Lorrie Christy MD     Procedures: see electronic medical records for all procedures/Xrays and details which were not copied into this note but were reviewed prior to creation of Plan. LABS:  I reviewed today's most current labs and imaging studies.   Pertinent labs include:  Recent Labs      04/08/17   1027  04/08/17   0430  04/07/17   1635   WBC  7.1  7.6  8.3   HGB  10.0*  10.0*  12.0*   HCT  31.2*  30.9*  38.1   PLT  168  180  229     Recent Labs      04/08/17   0430  04/08/17   0219  04/07/17   2142  04/07/17   1750  04/07/17   1635   NA  144  144  141  138  137   K  4.6  5.1  5.9*  6.9*  HEMOLYZED,RECOLLECT REQUESTED   CL  104  104  104  101  102   CO2  30  30  27  30  24   GLU  50*  51*  208*  184*  187*   BUN  43*  45*  51*  52*  51*   CREA  1.19  1.36*  1.82*  1.61*  1.62*   CA  8.3*  8.5  9.2  8.6  8.6   PHOS   --   4.5   --    --    --    ALB  2.6*  2.6*   --   2.8*  3.0*   TBILI  0.6   --    --   0.4  0.6   SGOT  58*   --    --   75*  109*   ALT  91*   --    --   103*  119*       Signed: Lorrie Christy MD

## 2017-04-08 NOTE — PROGRESS NOTES
PCU SHIFT NURSING NOTE      Bedside shift change report given to Benigno (oncoming nurse) by Davion Vázquez (offgoing nurse). Report included the following information SBAR, Kardex, ED Summary and Cardiac Rhythm Paced. Shift Summary: pt reports taking potassium supplements PTA \"I was prescribed 20mg but my potassium level was never over 4.1 and my cardiologist wouldn't prescribe me more, so I went to the pharmacy and found some and took five of those each day\" -- pt educated on normal K levels and danger of hyperkalemia and pt states \"Well it maybe normal for some people, but I prefer mine higher\"     7:45AM  Paged MD to discuss orders/plan of care    10:18 AM  Glucose 44, recheck 46; PRN Dextrose given. Pt asymptomatic. Paged MD for diet orders. AM labs sent as ordered. 10:37 AM  Glucose recheck 65. MD at bedside. 10:47 AM  Weaned oxygen to 2L SpO2 100% - will monitor and wean as tolerated. Glucose checked via earlobe as per MD - glucose 111 and also confirmed on BMP labs pt not hypoglycemic. Admission Date 4/7/2017   Admission Diagnosis Acute respiratory failure with hypoxia ([facility] [address]   Consults IP CONSULT TO CARDIOLOGY  IP CONSULT TO NEPHROLOGY        Consults   [x]PT   []OT   [x]Speech   []Case Management      [] Palliative      Cardiac Monitoring Order   [x]Yes   []No     IV drips   [x]Yes    Drip:  Dobutamine       Dose:  Drip:                            Dose:  Drip:                            Dose:   []No     GI Prophylaxis   []Yes   [x]No         DVT Prophylaxis   SCDs:             [place] stockings:         [x] Medication   []Contraindicated   []None      Activity Level           Purposeful Rounding every 1-2 hour?    [x]Yes   [place] Score  Total Score: 3   Bed Alarm (If score 3 or >)   [x]Yes   [] Refused (See signed refusal form in chart)   Presley Score      Presley Score (if score 14 or less)   []PMT consult   []Wound Care consult      []Specialty bed   [] Nutrition consult          Needs prior to discharge:   Home O2 required:    []Yes   []No    If yes, how much O2 required? TBD    Other:    Last Bowel Movement:        Influenza Vaccine          Pneumonia Vaccine           Diet Active Orders   There are no active orders of the following type(s): Diet. LDAs               Peripheral IV 04/07/17 Right Wrist (Active)   Site Assessment Clean, dry, & intact 4/7/2017  6:00 PM   Phlebitis Assessment 0 4/7/2017  6:00 PM   Infiltration Assessment 0 4/7/2017  6:00 PM   Dressing Status Clean, dry, & intact 4/7/2017  6:00 PM   Dressing Type Transparent 4/7/2017  6:00 PM   Hub Color/Line Status Pink 4/7/2017  6:00 PM                      Urinary Catheter Urinary Catheter 04/07/17 Pineda-Criteria for Appropriate Use: Medically/surgically unstable    Intake & Output   Date 04/07/17 0700 - 04/08/17 0659 04/08/17 0700 - 04/09/17 0659   Shift 4055-2253 6978-9560 24 Hour Total 2174-9855 3409-8562 24 Hour Total   I  N  T  A  K  E   Shift Total  (mL/kg)         O  U  T  P  U  T   Urine  (mL/kg/hr)    475  475      Urine Output (mL) (Urinary Catheter 04/07/17 Pineda)    475  475    Shift Total  (mL/kg)    475  (6.5)  475  (6.5)   NET    -475  -475   Weight (kg) 72.6 72.6 72.6 72.6 72.6 72.6         Readmission Risk Assessment Tool Score Medium Risk            16       Total Score        3 Relationship with PCP    4 More than 1 Admission in calendar year    5 Patient Insurance is Medicare, Medicaid or Self Pay    4 Charlson Comorbidity Score        Criteria that do not apply:    Patient Living Status    Patient Length of Stay > 5       Expected Length of Stay - - -   Actual Length of Stay 1        Unable to complete PTA Rx list as pt is unaware of home Rx/doses,       Primary Nurse Rose Villaseñor RN and Zohaib Tinoco RN performed a dual skin assessment on this patient Impairment noted- see wound doc flow sheet. BLE skin dry/cracked.    Presley score is 18

## 2017-04-08 NOTE — CONSULTS
[address]   [address] [place] [address]       Name: Nancy Ware   MR#:  [id number]   :  1945   Account #:  [de-identified]    Date of Consultation:  2017   Date of Adm:  2017       REASON FOR CONSULTATION: Elevated serum creatinine,   hyperkalemia. HISTORY OF PRESENT ILLNESS: This is a 67-year-old white male   with the following medical problems:    1. Congestive heart failure. 2. Diabetes mellitus. 3. Asthma. 4. Coronary artery disease. 5. Cardiomyopathy with ejection fraction 35-40%. 6. Suspected history of paroxysmal ventricular tachycardia. 7. Orthostatic hypotension. 8. Chronic obstructive pulmonary disease. 9. Status post implantable cardioverter-defibrillator. 10. Debilitation. This is a 67-year-old white male we are asked to see regarding an   elevated serum creatinine and hyperkalemia. Serum creatinine today   measured at 1.61 mg/dL with a potassium of 6.9 mEq per liter. For   reference, his serum creatinine when measured in 2017 ranged   1.36-1.24 mg/dL. He denies any knowledge of renal insufficiency, despite abnormal   renal function noted earlier in March. He denies any history of   hematuria, proteinuria, nephrolithiasis or pyelonephritis. He denies any   difficulty initiating or maintaining a stream of urine, but cannot recall   when he last voided. No family history of kidney disease. He is a   former smoker, quit. There is no previous history of transfusion,   hepatitis, jaundice. It appears that he had been treated previously with   an ACE inhibitor, but was discharged from the hospital in March, not   on such therapy. He is followed primarily down at [facility], per review of   the computerized database. OUTPATIENT MEDICATIONS: (per discharge summary 2017). 1. Metoprolol. 2. Coumadin. 3. Gluconate. 4. Metformin. 5. Amiodarone. 6. Aspirin.    7. Furosemide 20 mg daily.   8. Amaryl. 9. Mexiletine. 10. Januvia. 11. Ubiquinone. FAMILY HISTORY: Negative as above. SOCIAL HISTORY: Former smoker, quit. REVIEW OF SYSTEMS   Negative except as noted above. PHYSICAL EXAMINATION   GENERAL: Very frail-appearing, elderly male, semi-supine in bed. VITAL SIGNS: Most recent blood pressure documented at 113/62,   pulse 64, respirations 18. HEENT: Head is normocephalic and atraumatic. Eyes show no scleral   icterus. NECK: Appears supple. There is prominent JVD noted. LUNGS: Relatively clear to auscultation. CARDIOVASCULAR: Shows a rhythm which appears to be regular. There is no pericardial friction rub. ABDOMEN: Soft, nondistended. EXTREMITIES: Show trace thigh edema, trace pretibial edema. SKIN: Warm to touch. NEUROLOGIC: Awake and alert, and seems to answer questions   appropriately. LABORATORY DATA: Hematocrit 38.1, platelet count 139,956. Sodium 137, potassium 6.9, chloride and bicarbonate are 101 and 30   with a BUN and creatinine are 52 and 1.61, glucose 184. Chest x-ray shows a left pleural effusion. Urinalysis in 03/2017 showed a trace proteinuria and negative for   blood. ASSESSMENT AND PLAN: The patient appears to have probable   chronic kidney disease. He has multiple risk factors. He has low   cardiac output state, history of diabetes mellitus and is a former   smoker. He denies any obstructive symptoms, but urinary obstruction   would be a consideration, as well. He does not appear to be anemic at   this juncture. He had no significant hematuria a month ago, does have some   proteinuria by urinary dipstick. His biggest issue currently is that of hyperkalemia and that is being   treated in the emergency room. His hyperkalemia is being treated with   albuterol, calcium gluconate and insulin; my understanding is that he   has also received Kayexalate, if not already prescribed I will write for   this.  Repeat potassium in about 3-4 hours also ordered. I have also   asked that a Pineda catheter be placed. He may not do well with renal replacement therapy, given the multitude   of antiarrhythmic agents he is currently taking. I did discuss the   potential need for renal replacement therapy with him this evening,   however. Hopefully, things will improve with the use of dobutamine   and the other therapies instituted in the emergency room. Will check a renal ultrasound, as well, and my partner, Dr. Sulaiman Garcia, will   followup in a.m. .   Thanks for the consult.         Luciano Pizano MD [facility] / Stephy Zuñiga   D:  04/07/2017   20:26   T:  04/07/2017   20:58   Job #:  [id number]

## 2017-04-08 NOTE — CONSULTS
Consult    NAME: Bettina Burkett   :     MRN:  [id number]     Date/Time:  2017 3:27 PM    Patient PCP: Bryce Thomas NP  ________________________________________________________________________     Assessment:     1. Acute renal failure - resolved  2. Hyperkalemia -- resolved  3. Chronic systolic heart failure  4. Acute respiratory failure w/ hypoxia  5. Acute encephalopathy  6. Multiple falls recently  7. Ventricular tachycardia s/p multiple ablations  8. Recent upgrade to BiV-ICD 3/1/17 @ [facility] [place] (Dr. Keenan Amaya)  9. Recent cardiogenic shock at [facility] [place] following groin bleed from procedure; required inotropes briefly and PRBC  10. Diabetes  11. Hypertension       3/1 d/c from [facility] [place]  NICM EF 35%  ICD  Multiple VT ablations  Had VT abl planned -- hematoma left groin complication w/ bleed requiring PRBC -- vasc sx (AVF by duplex -- no therapy)  Repeat groin duplex in 6 weeks. Cardiogenic shock - on dobutamine briefly  Device upgrade to CRT 3/1    2/23 -- echo 35-40%; trivial mild MR, mod TR, mild AS, trivial to mild AI, RVSP 51    D/C meds  Mexili 150 BID  Gloria 648 BID  Metop 12.5 succ     Plan:   Echo in the ER showed severely reduced LVEF with pan-valvular regurg. No pericardial effusion. Feels better today. Briefly on inotropes last night, not sure this was needed. CXR with possible LLL opacity    1. Diuresis per primary; does not appear volume overloaded on clinical exam  2. He does not require additional inotropic support at this time  3. Resume mexilitine 150mg BID  4. Resume quinidine 648mg BID  5. Continue amiodarone 200mg dilay  6. Can resume Toprol XL 12.5mg when BP stable (tomorrow?)  7. Continue ASA  8. Needs PT to work with him -- likely needs rehab    Thank you for this consult and allowing me to take part in this patients care. Please call with questions.         [x]        High complexity decision making was performed        Subjective:   CHIEF COMPLAINT:  SOB, dont feel good    HISTORY OF PRESENT ILLNESS:     Chloe Painting is a 66 y.o.  male who has been feeling unwell, not himself, and dyspneic for a couple of days. He is a poor historian; his brother says he has had memory problems for a while. Just had significant hospital stay at [facility]; d/c'ed 3/1. Multiple falls recently. We were asked to consult for work up and evaluation of the above problems. Past Medical History:   Diagnosis Date    Asthma     COPD    CAD (coronary artery disease)     AICD implanted March 2017    Diabetes ([facility]     Heart failure ([facility]       Past Surgical History:   Procedure Laterality Date    HX PACEMAKER       No Known Allergies   Meds:  See below  Social History   Substance Use Topics    Smoking status: Former Smoker    Smokeless tobacco: Not on file    Alcohol use No      History reviewed. No pertinent family history. REVIEW OF SYSTEMS:     []         Unable to obtain  ROS due to ---   [x]         Total of 12 systems reviewed as follows:    Constitutional: negative fever, negative chills, negative weight loss  Eyes:   negative visual changes  ENT:   negative sore throat, tongue or lip swelling  Respiratory:  negative cough, negative dyspnea  Cards:  negative for chest pain, palpitations, lower extremity edema  GI:   negative for nausea, vomiting, diarrhea, and abdominal pain  Genitourinary: negative for frequency, dysuria  Integument:  negative for rash   Hematologic:  negative for easy bruising and gum/nose bleeding  Musculoskel: negative for myalgias,  back pain  Neurological:  negative for headaches, dizziness, vertigo, weakness  Behavl/Psych: negative for feelings of anxiety, depression     Pertinent Positives include :    Objective:      Physical Exam:    Last 24hrs VS reviewed since prior progress note.  Most recent are:    Visit Vitals    BP 94/57    Pulse 82    Temp 97.4 °F (36.3 °C)    Resp 16    Ht 6' 3\" (1.905 m)    Wt 70.7 kg (155 lb 14.4 oz)    SpO2 98%  BMI 19.49 kg/m2       Intake/Output Summary (Last 24 hours) at 04/08/17 1527  Last data filed at 04/08/17 1409   Gross per 24 hour   Intake           492.66 ml   Output             1575 ml   Net         -1082.34 ml        General Appearance: Well developed, well nourished, alert & oriented x 3,    no acute distress. Ears/Nose/Mouth/Throat: Pupils equal and round, Hearing grossly normal.  Neck: Supple. JVP within normal limits. Carotids good upstrokes, with no bruit. Chest: Lungs clear to auscultation bilaterally. Cardiovascular: Regular rate and rhythm, S1S2 normal, no murmur, rubs, gallops. Abdomen: Soft, non-tender, bowel sounds are active. No organomegaly. Extremities: Trace edema bilaterally. Femoral pulses +2, Distal Pulses +1. Venous stasis, erythematous toes bilaterally  Skin: Warm and dry. Neuro: CN II-XII grossly intact, Strength and sensation grossly intact. []         Post-cath site without hematoma, bruit, tenderness, or thrill. Distal pulses intact. Data:      Telemetry:  BiV Paced    EKG: BiV paced  []  No new EKG for review. Prior to Admission medications    Medication Sig Start Date End Date Taking? Authorizing Provider   quiNIDine gluconate SR (DURA-TABS) 324 mg SR tablet Take 324 mg by mouth daily. Historical Provider   metoprolol succinate (TOPROL-XL) 25 mg XL tablet Take 0.5 Tabs by mouth daily. 3/17/17   Maira Raza MD   metFORMIN (GLUCOPHAGE) 1,000 mg tablet Take 1,000 mg by mouth two (2) times daily (with meals). Historical Provider   omega 3-dha-epa-fish oil 360-1,200 mg cpDR Take 2,400 mg by mouth three (3) times daily. Historical Provider   GLUCOSAM/GLUC PEREZ/NQ-BRS-N-GLUC (GLUCOSAMINE COMPLEX PO) Take 1 Tab by mouth daily. Historical Provider   amiodarone (CORDARONE) 200 mg tablet Take 200 mg by mouth daily. Ashlee Houser MD   ascorbic acid, vitamin C, 1,500 mg TbER Take 1,500 mg by mouth two (2) times a day.     Ashlee Houser MD   aspirin 81 mg chewable tablet Take 81 mg by mouth daily. Ashlee Houser MD   calcium-cholecalciferol, d3, (CALCIUM 600 + D) 600-125 mg-unit tab Take 1 Tab by mouth daily. Ashlee Houser MD   furosemide (LASIX) 20 mg tablet Take 20 mg by mouth daily. Ashlee Houser MD   glimepiride (AMARYL) 4 mg tablet Take 4 mg by mouth every morning. Ashlee Houser MD   mexiletine (MEXITIL) 150 mg capsule Take 150 mg by mouth two (2) times a day. Ashlee Houser MD   multivitamin (ONE A DAY) tablet Take 1 Tab by mouth daily. Ashlee Houser MD   SITagliptin (JANUVIA) 100 mg tablet Take 100 mg by mouth daily. Ashlee Houser MD   UBIQUINONE PO Take 300 mg by mouth daily. Ashlee Houser MD   vitamin E (AQUA GEMS) 400 unit capsule Take 400 Units by mouth daily. Ashlee Houser MD       Recent Results (from the past 24 hour(s))   EKG, 12 LEAD, INITIAL    Collection Time: 04/07/17  4:08 PM   Result Value Ref Range    Ventricular Rate 61 BPM    Atrial Rate 61 BPM    P-R Interval 160 ms    QRS Duration 18 ms    Q-T Interval 528 ms    QTC Calculation (Bezet) 531 ms    Calculated P Axis 92 degrees    Calculated R Axis 0 degrees    Calculated T Axis 93 degrees    Diagnosis       Atrial-paced rhythm with occasional ventricular-paced complexes  Indeterminate axis  Pulmonary disease pattern  ST elevation, consider anterolateral injury or acute infarct  ST elevation, consider inferior injury or acute infarct  Prolonged QT  ** ** ACUTE MI / STEMI ** **  Consider right ventricular involvement in acute inferior infarct  Abnormal ECG  When compared with ECG of 13-MAR-2017 15:24,  Vent.  rate has decreased BY   6 BPM     CBC WITH AUTOMATED DIFF    Collection Time: 04/07/17  4:35 PM   Result Value Ref Range    WBC 8.3 4.1 - 11.1 K/uL    RBC 3.89 (L) 4.10 - 5.70 M/uL    HGB 12.0 (L) 12.1 - 17.0 g/dL    HCT 38.1 36.6 - 50.3 %    MCV 97.9 80.0 - 99.0 FL    MCH 30.8 26.0 - 34.0 PG    MCHC 31.5 30.0 - 36.5 g/dL    RDW 15.1 (H) 11.5 - 14.5 %    PLATELET 609 212 - 635 K/uL NEUTROPHILS 82 (H) 32 - 75 %    LYMPHOCYTES 8 (L) 12 - 49 %    MONOCYTES 10 5 - 13 %    EOSINOPHILS 0 0 - 7 %    BASOPHILS 0 0 - 1 %    ABS. NEUTROPHILS 6.8 1.8 - 8.0 K/UL    ABS. LYMPHOCYTES 0.7 (L) 0.8 - 3.5 K/UL    ABS. MONOCYTES 0.8 0.0 - 1.0 K/UL    ABS. EOSINOPHILS 0.0 0.0 - 0.4 K/UL    ABS. BASOPHILS 0.0 0.0 - 0.1 K/UL    RBC COMMENTS MACROCYTOSIS  1+       METABOLIC PANEL, COMPREHENSIVE    Collection Time: 04/07/17  4:35 PM   Result Value Ref Range    Sodium 137 136 - 145 mmol/L    Potassium HEMOLYZED,RECOLLECT REQUESTED 3.5 - 5.1 mmol/L    Chloride 102 97 - 108 mmol/L    CO2 24 21 - 32 mmol/L    Anion gap 11 5 - 15 mmol/L    Glucose 187 (H) 65 - 100 mg/dL    BUN 51 (H) 6 - 20 MG/DL    Creatinine 1.62 (H) 0.70 - 1.30 MG/DL    BUN/Creatinine ratio 31 (H) 12 - 20      GFR est AA 51 (L) >60 ml/min/1.73m2    GFR est non-AA 42 (L) >60 ml/min/1.73m2    Calcium 8.6 8.5 - 10.1 MG/DL    Bilirubin, total 0.6 0.2 - 1.0 MG/DL    ALT (SGPT) 119 (H) 12 - 78 U/L    AST (SGOT) 109 (H) 15 - 37 U/L    Alk.  phosphatase 91 45 - 117 U/L    Protein, total 7.1 6.4 - 8.2 g/dL    Albumin 3.0 (L) 3.5 - 5.0 g/dL    Globulin 4.1 (H) 2.0 - 4.0 g/dL    A-G Ratio 0.7 (L) 1.1 - 2.2     CK W/ CKMB & INDEX    Collection Time: 04/07/17  4:35 PM   Result Value Ref Range     39 - 308 U/L    CK - MB 3.2 <3.6 NG/ML    CK-MB Index 2.3 0 - 2.5     TROPONIN I    Collection Time: 04/07/17  4:35 PM   Result Value Ref Range    Troponin-I, Qt. <0.04 <0.05 ng/mL   PRO-BNP    Collection Time: 04/07/17  4:35 PM   Result Value Ref Range    NT pro-BNP 84771 (H) 0 - 125 PG/ML   LACTIC ACID, PLASMA    Collection Time: 04/07/17  4:35 PM   Result Value Ref Range    Lactic acid 4.4 (HH) 0.4 - 2.0 MMOL/L   METABOLIC PANEL, COMPREHENSIVE    Collection Time: 04/07/17  5:50 PM   Result Value Ref Range    Sodium 138 136 - 145 mmol/L    Potassium 6.9 (HH) 3.5 - 5.1 mmol/L    Chloride 101 97 - 108 mmol/L    CO2 30 21 - 32 mmol/L    Anion gap 7 5 - 15 mmol/L Glucose 184 (H) 65 - 100 mg/dL    BUN 52 (H) 6 - 20 MG/DL    Creatinine 1.61 (H) 0.70 - 1.30 MG/DL    BUN/Creatinine ratio 32 (H) 12 - 20      GFR est AA 51 (L) >60 ml/min/1.73m2    GFR est non-AA 42 (L) >60 ml/min/1.73m2    Calcium 8.6 8.5 - 10.1 MG/DL    Bilirubin, total 0.4 0.2 - 1.0 MG/DL    ALT (SGPT) 103 (H) 12 - 78 U/L    AST (SGOT) 75 (H) 15 - 37 U/L    Alk.  phosphatase 80 45 - 117 U/L    Protein, total 6.1 (L) 6.4 - 8.2 g/dL    Albumin 2.8 (L) 3.5 - 5.0 g/dL    Globulin 3.3 2.0 - 4.0 g/dL    A-G Ratio 0.8 (L) 1.1 - 2.2     BLOOD GAS, ARTERIAL    Collection Time: 04/07/17  7:00 PM   Result Value Ref Range    pH 7.37 7.35 - 7.45      PCO2 44 35.0 - 45.0 mmHg    PO2 105 (H) 80 - 100 mmHg    O2 SAT 98 (H) 92 - 97 %    BICARBONATE 25 22 - 26 mmol/L    BASE DEFICIT 0.6 mmol/L    O2 METHOD ROOM AIR      MODE OTHER      Sample source ARTERIAL      SITE RIGHT RADIAL      RAD'S TEST YES     GLUCOSE, POC    Collection Time: 04/07/17  8:02 PM   Result Value Ref Range    Glucose (POC) 152 (H) 65 - 100 mg/dL    Performed by [facility]    METABOLIC PANEL, BASIC    Collection Time: 04/07/17  9:42 PM   Result Value Ref Range    Sodium 141 136 - 145 mmol/L    Potassium 5.9 (H) 3.5 - 5.1 mmol/L    Chloride 104 97 - 108 mmol/L    CO2 27 21 - 32 mmol/L    Anion gap 10 5 - 15 mmol/L    Glucose 208 (H) 65 - 100 mg/dL    BUN 51 (H) 6 - 20 MG/DL    Creatinine 1.82 (H) 0.70 - 1.30 MG/DL    BUN/Creatinine ratio 28 (H) 12 - 20      GFR est AA 45 (L) >60 ml/min/1.73m2    GFR est non-AA 37 (L) >60 ml/min/1.73m2    Calcium 9.2 8.5 - 10.1 MG/DL   LACTIC ACID, PLASMA    Collection Time: 04/07/17  9:42 PM   Result Value Ref Range    Lactic acid 7.5 (HH) 0.4 - 2.0 MMOL/L   RENAL FUNCTION PANEL    Collection Time: 04/08/17  2:19 AM   Result Value Ref Range    Sodium 144 136 - 145 mmol/L    Potassium 5.1 3.5 - 5.1 mmol/L    Chloride 104 97 - 108 mmol/L    CO2 30 21 - 32 mmol/L    Anion gap 10 5 - 15 mmol/L    Glucose 51 (L) 65 - 100 mg/dL    BUN 45 (H) 6 - 20 MG/DL    Creatinine 1.36 (H) 0.70 - 1.30 MG/DL    BUN/Creatinine ratio 33 (H) 12 - 20      GFR est AA >60 >60 ml/min/1.73m2    GFR est non-AA 52 (L) >60 ml/min/1.73m2    Calcium 8.5 8.5 - 10.1 MG/DL    Phosphorus 4.5 2.6 - 4.7 MG/DL    Albumin 2.6 (L) 3.5 - 5.0 g/dL   LACTIC ACID, PLASMA    Collection Time: 04/08/17  4:30 AM   Result Value Ref Range    Lactic acid 1.7 0.4 - 2.0 MMOL/L   CBC WITH AUTOMATED DIFF    Collection Time: 04/08/17  4:30 AM   Result Value Ref Range    WBC 7.6 4.1 - 11.1 K/uL    RBC 3.23 (L) 4.10 - 5.70 M/uL    HGB 10.0 (L) 12.1 - 17.0 g/dL    HCT 30.9 (L) 36.6 - 50.3 %    MCV 95.7 80.0 - 99.0 FL    MCH 31.0 26.0 - 34.0 PG    MCHC 32.4 30.0 - 36.5 g/dL    RDW 15.0 (H) 11.5 - 14.5 %    PLATELET 775 865 - 978 K/uL    NEUTROPHILS 75 32 - 75 %    LYMPHOCYTES 13 12 - 49 %    MONOCYTES 12 5 - 13 %    EOSINOPHILS 0 0 - 7 %    BASOPHILS 0 0 - 1 %    ABS. NEUTROPHILS 5.7 1.8 - 8.0 K/UL    ABS. LYMPHOCYTES 1.0 0.8 - 3.5 K/UL    ABS. MONOCYTES 0.9 0.0 - 1.0 K/UL    ABS. EOSINOPHILS 0.0 0.0 - 0.4 K/UL    ABS. BASOPHILS 0.0 0.0 - 0.1 K/UL   METABOLIC PANEL, COMPREHENSIVE    Collection Time: 04/08/17  4:30 AM   Result Value Ref Range    Sodium 144 136 - 145 mmol/L    Potassium 4.6 3.5 - 5.1 mmol/L    Chloride 104 97 - 108 mmol/L    CO2 30 21 - 32 mmol/L    Anion gap 10 5 - 15 mmol/L    Glucose 50 (L) 65 - 100 mg/dL    BUN 43 (H) 6 - 20 MG/DL    Creatinine 1.19 0.70 - 1.30 MG/DL    BUN/Creatinine ratio 36 (H) 12 - 20      GFR est AA >60 >60 ml/min/1.73m2    GFR est non-AA >60 >60 ml/min/1.73m2    Calcium 8.3 (L) 8.5 - 10.1 MG/DL    Bilirubin, total 0.6 0.2 - 1.0 MG/DL    ALT (SGPT) 91 (H) 12 - 78 U/L    AST (SGOT) 58 (H) 15 - 37 U/L    Alk.  phosphatase 68 45 - 117 U/L    Protein, total 5.5 (L) 6.4 - 8.2 g/dL    Albumin 2.6 (L) 3.5 - 5.0 g/dL    Globulin 2.9 2.0 - 4.0 g/dL    A-G Ratio 0.9 (L) 1.1 - 2.2     GLUCOSE, POC    Collection Time: 04/08/17 10:12 AM   Result Value Ref Range Glucose (POC) 44 (LL) 65 - 100 mg/dL    Performed by Sidney 39, POC    Collection Time: 04/08/17 10:13 AM   Result Value Ref Range    Glucose (POC) 46 (LL) 65 - 100 mg/dL    Performed by [facility]    Collection Time: 04/08/17 10:27 AM   Result Value Ref Range    Sodium 144 136 - 145 mmol/L    Potassium 4.1 3.5 - 5.1 mmol/L    Chloride 105 97 - 108 mmol/L    CO2 31 21 - 32 mmol/L    Anion gap 8 5 - 15 mmol/L    Glucose 165 (H) 65 - 100 mg/dL    BUN 39 (H) 6 - 20 MG/DL    Creatinine 1.04 0.70 - 1.30 MG/DL    BUN/Creatinine ratio 38 (H) 12 - 20      GFR est AA >60 >60 ml/min/1.73m2    GFR est non-AA >60 >60 ml/min/1.73m2    Calcium 7.8 (L) 8.5 - 10.1 MG/DL    Bilirubin, total 0.5 0.2 - 1.0 MG/DL    ALT (SGPT) 83 (H) 12 - 78 U/L    AST (SGOT) 53 (H) 15 - 37 U/L    Alk. phosphatase 66 45 - 117 U/L    Protein, total 5.3 (L) 6.4 - 8.2 g/dL    Albumin 2.3 (L) 3.5 - 5.0 g/dL    Globulin 3.0 2.0 - 4.0 g/dL    A-G Ratio 0.8 (L) 1.1 - 2.2     CBC WITH AUTOMATED DIFF    Collection Time: 04/08/17 10:27 AM   Result Value Ref Range    WBC 7.1 4.1 - 11.1 K/uL    RBC 3.29 (L) 4.10 - 5.70 M/uL    HGB 10.0 (L) 12.1 - 17.0 g/dL    HCT 31.2 (L) 36.6 - 50.3 %    MCV 94.8 80.0 - 99.0 FL    MCH 30.4 26.0 - 34.0 PG    MCHC 32.1 30.0 - 36.5 g/dL    RDW 15.1 (H) 11.5 - 14.5 %    PLATELET 602 733 - 276 K/uL    NEUTROPHILS 79 (H) 32 - 75 %    LYMPHOCYTES 11 (L) 12 - 49 %    MONOCYTES 10 5 - 13 %    EOSINOPHILS 0 0 - 7 %    BASOPHILS 0 0 - 1 %    ABS. NEUTROPHILS 5.6 1.8 - 8.0 K/UL    ABS. LYMPHOCYTES 0.8 0.8 - 3.5 K/UL    ABS. MONOCYTES 0.7 0.0 - 1.0 K/UL    ABS. EOSINOPHILS 0.0 0.0 - 0.4 K/UL    ABS.  BASOPHILS 0.0 0.0 - 0.1 K/UL   GLUCOSE, POC    Collection Time: 04/08/17 10:35 AM   Result Value Ref Range    Glucose (POC) 65 65 - 100 mg/dL    Performed by Sidney 39, POC    Collection Time: 04/08/17 10:56 AM   Result Value Ref Range    Glucose (POC) 39 (LL) 65 - 100 mg/dL    Performed by Sidney 39, POC    Collection Time: 04/08/17 10:58 AM   Result Value Ref Range    Glucose (POC) 111 (H) 65 - 100 mg/dL    Performed by Golda Hodgkin III, DO

## 2017-04-08 NOTE — PROGRESS NOTES
Dictated  prob ckd here with high K    To get med tx for high K    - US eladio, more SPS, recheck chems    Altho may not \"do well\" with HD, he says he would \"want it\" and hopefully he will improve with therapies and interventions prescribed.

## 2017-04-09 LAB
ALBUMIN SERPL BCP-MCNC: 2.4 G/DL (ref 3.5–5)
ANION GAP BLD CALC-SCNC: 12 MMOL/L (ref 5–15)
ANION GAP BLD CALC-SCNC: 9 MMOL/L (ref 5–15)
BUN SERPL-MCNC: 30 MG/DL (ref 6–20)
BUN SERPL-MCNC: 33 MG/DL (ref 6–20)
BUN/CREAT SERPL: 30 (ref 12–20)
BUN/CREAT SERPL: 33 (ref 12–20)
CALCIUM SERPL-MCNC: 8 MG/DL (ref 8.5–10.1)
CALCIUM SERPL-MCNC: 8.1 MG/DL (ref 8.5–10.1)
CHLORIDE SERPL-SCNC: 101 MMOL/L (ref 97–108)
CHLORIDE SERPL-SCNC: 104 MMOL/L (ref 97–108)
CO2 SERPL-SCNC: 29 MMOL/L (ref 21–32)
CO2 SERPL-SCNC: 32 MMOL/L (ref 21–32)
CREAT SERPL-MCNC: 0.99 MG/DL (ref 0.7–1.3)
CREAT SERPL-MCNC: 1.01 MG/DL (ref 0.7–1.3)
GLUCOSE BLD STRIP.AUTO-MCNC: 234 MG/DL (ref 65–100)
GLUCOSE BLD STRIP.AUTO-MCNC: 238 MG/DL (ref 65–100)
GLUCOSE BLD STRIP.AUTO-MCNC: 245 MG/DL (ref 65–100)
GLUCOSE BLD STRIP.AUTO-MCNC: 84 MG/DL (ref 65–100)
GLUCOSE SERPL-MCNC: 63 MG/DL (ref 65–100)
GLUCOSE SERPL-MCNC: 67 MG/DL (ref 65–100)
MAGNESIUM SERPL-MCNC: 1.9 MG/DL (ref 1.6–2.4)
PHOSPHATE SERPL-MCNC: 3.4 MG/DL (ref 2.6–4.7)
POTASSIUM SERPL-SCNC: 3.9 MMOL/L (ref 3.5–5.1)
POTASSIUM SERPL-SCNC: 4 MMOL/L (ref 3.5–5.1)
SERVICE CMNT-IMP: ABNORMAL
SERVICE CMNT-IMP: NORMAL
SODIUM SERPL-SCNC: 142 MMOL/L (ref 136–145)
SODIUM SERPL-SCNC: 145 MMOL/L (ref 136–145)

## 2017-04-09 PROCEDURE — 83735 ASSAY OF MAGNESIUM: CPT | Performed by: INTERNAL MEDICINE

## 2017-04-09 PROCEDURE — 36600 WITHDRAWAL OF ARTERIAL BLOOD: CPT

## 2017-04-09 PROCEDURE — 97116 GAIT TRAINING THERAPY: CPT | Performed by: PHYSICAL THERAPIST

## 2017-04-09 PROCEDURE — 74011250636 HC RX REV CODE- 250/636: Performed by: INTERNAL MEDICINE

## 2017-04-09 PROCEDURE — 82962 GLUCOSE BLOOD TEST: CPT

## 2017-04-09 PROCEDURE — G8979 MOBILITY GOAL STATUS: HCPCS | Performed by: PHYSICAL THERAPIST

## 2017-04-09 PROCEDURE — 36415 COLL VENOUS BLD VENIPUNCTURE: CPT | Performed by: INTERNAL MEDICINE

## 2017-04-09 PROCEDURE — 74011250637 HC RX REV CODE- 250/637: Performed by: INTERNAL MEDICINE

## 2017-04-09 PROCEDURE — 80069 RENAL FUNCTION PANEL: CPT | Performed by: INTERNAL MEDICINE

## 2017-04-09 PROCEDURE — 80048 BASIC METABOLIC PNL TOTAL CA: CPT | Performed by: INTERNAL MEDICINE

## 2017-04-09 PROCEDURE — G8978 MOBILITY CURRENT STATUS: HCPCS | Performed by: PHYSICAL THERAPIST

## 2017-04-09 PROCEDURE — 74011636637 HC RX REV CODE- 636/637: Performed by: INTERNAL MEDICINE

## 2017-04-09 PROCEDURE — 97162 PT EVAL MOD COMPLEX 30 MIN: CPT | Performed by: PHYSICAL THERAPIST

## 2017-04-09 PROCEDURE — 65660000000 HC RM CCU STEPDOWN

## 2017-04-09 RX ORDER — METOPROLOL SUCCINATE 25 MG/1
12.5 TABLET, EXTENDED RELEASE ORAL DAILY
Status: DISCONTINUED | OUTPATIENT
Start: 2017-04-09 | End: 2017-04-11 | Stop reason: HOSPADM

## 2017-04-09 RX ORDER — FUROSEMIDE 10 MG/ML
20 INJECTION INTRAMUSCULAR; INTRAVENOUS DAILY
Status: DISCONTINUED | OUTPATIENT
Start: 2017-04-10 | End: 2017-04-10

## 2017-04-09 RX ADMIN — Medication 10 ML: at 21:14

## 2017-04-09 RX ADMIN — MEXILETINE HYDROCHLORIDE 150 MG: 150 CAPSULE ORAL at 11:19

## 2017-04-09 RX ADMIN — HEPARIN SODIUM 5000 UNITS: 5000 INJECTION, SOLUTION INTRAVENOUS; SUBCUTANEOUS at 04:00

## 2017-04-09 RX ADMIN — MEXILETINE HYDROCHLORIDE 150 MG: 150 CAPSULE ORAL at 18:08

## 2017-04-09 RX ADMIN — AMIODARONE HYDROCHLORIDE 200 MG: 200 TABLET ORAL at 11:14

## 2017-04-09 RX ADMIN — ASPIRIN 81 MG CHEWABLE TABLET 81 MG: 81 TABLET CHEWABLE at 11:14

## 2017-04-09 RX ADMIN — INSULIN LISPRO 3 UNITS: 100 INJECTION, SOLUTION INTRAVENOUS; SUBCUTANEOUS at 11:30

## 2017-04-09 RX ADMIN — Medication 5 ML: at 14:00

## 2017-04-09 RX ADMIN — INSULIN LISPRO 2 UNITS: 100 INJECTION, SOLUTION INTRAVENOUS; SUBCUTANEOUS at 21:14

## 2017-04-09 RX ADMIN — HEPARIN SODIUM 5000 UNITS: 5000 INJECTION, SOLUTION INTRAVENOUS; SUBCUTANEOUS at 21:14

## 2017-04-09 RX ADMIN — METOPROLOL SUCCINATE 12.5 MG: 25 TABLET, EXTENDED RELEASE ORAL at 11:20

## 2017-04-09 RX ADMIN — QUINIDINE GLUCONATE 324 MG: 324 TABLET, EXTENDED RELEASE ORAL at 11:19

## 2017-04-09 RX ADMIN — INSULIN LISPRO 3 UNITS: 100 INJECTION, SOLUTION INTRAVENOUS; SUBCUTANEOUS at 18:08

## 2017-04-09 RX ADMIN — HEPARIN SODIUM 5000 UNITS: 5000 INJECTION, SOLUTION INTRAVENOUS; SUBCUTANEOUS at 11:21

## 2017-04-09 NOTE — PROGRESS NOTES
Problem: Mobility Impaired (Adult and Pediatric)  Goal: *Acute Goals and Plan of Care (Insert Text)  Physical Therapy Goals  Initiated 4/9/2017  1. Patient will move from supine to sit and sit to supine in bed with independence within 7 day(s). 2. Patient will transfer from bed to chair and chair to bed with supervision/set-up using the least restrictive device within 7 day(s). 3. Patient will perform sit to stand with independence within 7 day(s). 4. Patient will ambulate with minimal assistance/contact guard assist for 150 feet with the least restrictive device within 7 day(s). 5. Patient will ascend/descend 4 stairs with 1 handrail(s) with minimal assistance/contact guard assist within 7 day(s). PHYSICAL THERAPY EVALUATION  Patient: Adolfo Mckay (34 y.o. male)  Date: 4/9/2017  Primary Diagnosis: Acute respiratory failure with hypoxia (HCC)        Precautions: fall         ASSESSMENT :  Based on the objective data described below, the patient presents with impaired balance, generalized weakness, decreased activity tolerance, decreased safety awareness, and decreased functional mobility skills. Bed mobility with supervision. Sit to stand with CGA. Patient ambulated 36' with rolling walker and CGA with flexed posture, wide GUTIERREZ, and short steps. Patient requires abrupt sitting rest break so chair brought behind patient. After 4 minute rest, patient ambulated additional 40' into room to chair. Patient attempts to sit before chair completely in place. Patient quite impulsive throughout session and requires frequent cues to stay on task. Patient states that he fatigues and his knees will give out unexpectedly (two recent falls at home). Prior to admission, patient was at home (recently discharged from SNF) with caregivers each day. Patient has a rolling walker but does not always use it.  Recommend SNF at discharge in order to increase strength, mobility, balance, and endurance prior to returning home.     Patient will benefit from skilled intervention to address the above impairments. Patients rehabilitation potential is considered to be Fair  Factors which may influence rehabilitation potential include:   [ ]         None noted  [ ]         Mental ability/status  [ ]         Medical condition  [X]         Home/family situation and support systems  [X]         Safety awareness  [ ]         Pain tolerance/management  [ ]         Other:        PLAN :  Recommendations and Planned Interventions:  [X]           Bed Mobility Training             [ ]    Neuromuscular Re-Education  [X]           Transfer Training                   [ ]    Orthotic/Prosthetic Training  [X]           Gait Training                         [ ]    Modalities  [X]           Therapeutic Exercises           [ ]    Edema Management/Control  [X]           Therapeutic Activities            [ ]    Patient and Family Training/Education  [ ]           Other (comment):     Frequency/Duration: Patient will be followed by physical therapy  4 times a week to address goals. Discharge Recommendations: Skilled Nursing Facility  Further Equipment Recommendations for Discharge: none       SUBJECTIVE:   Patient stated I really want to go back home.       OBJECTIVE DATA SUMMARY:   HISTORY:    Past Medical History:   Diagnosis Date    Asthma       COPD    CAD (coronary artery disease)       AICD implanted March 2017    Diabetes ([facility]      Heart failure ([facility] [address]       Past Surgical History:   Procedure Laterality Date    HX PACEMAKER         Prior Level of Function/Home Situation: Patient lives at home alone with assistance of caregivers.  Four steps to enter; frequent falls  Personal factors and/or comorbidities impacting plan of care: safety awareness, home situation     Home Situation  Home Environment: Private residence  # Steps to Enter: 4  Rails to Enter: Yes  One/Two Story Residence: One story  Living Alone: Yes  Support Systems: Home care staff  Patient Expects to be Discharged to[de-identified] Private residence     EXAMINATION/PRESENTATION/DECISION MAKING:   Critical Behavior:  Neurologic State: Alert  Orientation Level: Oriented to place, Oriented to person  Cognition: Decreased attention/concentration, Poor safety awareness  Safety/Judgement: Awareness of environment, Decreased awareness of need for safety, Decreased awareness of need for assistance, Decreased insight into deficits  Hearing:   Auditory  Auditory Impairment: None  Skin:  intact  Edema: none noted  Range Of Motion:  AROM: Generally decreased, functional           PROM: Within functional limits           Strength:    Strength: Generally decreased, functional                    Tone & Sensation:   Tone: Normal              Sensation: Intact               Coordination:  Coordination: Within functional limits  Vision:      Functional Mobility:  Bed Mobility:  Rolling: Modified independent  Supine to Sit: Supervision     Scooting: Independent  Transfers:  Sit to Stand: Contact guard assistance  Stand to Sit: Minimum assistance        Bed to Chair: Contact guard assistance              Balance:   Sitting: Intact  Standing: Impaired  Standing - Static: Constant support;Good  Standing - Dynamic : Fair (with rolling walker)  Ambulation/Gait Training:  Distance (ft): 80 Feet (ft) (40' x 2)  Assistive Device: Gait belt;Walker, rolling  Ambulation - Level of Assistance: Contact guard assistance        Gait Abnormalities: Decreased step clearance        Base of Support: Widened        Step Length: Left shortened;Right shortened                                                             Functional Measure:     Elder Mobility Scale      9/20            EMS and G-code impairment scale:  Percentage of impairment CH  0% CI  1-19% CJ  20-39% CK  40-59% CL  60-79% CM  80-99% CN  100%   EMS Score 0-20 20 17-19 13-16 9-12 5-8 1-4 0      Scores under 10  generally these patients are dependent in mobility maneuvers; require help with  basic ADL, such as transfers, toileting and dressing. Scores between 10  13  generally these patients are borderline in terms of safe mobility and  independence in ADL i.e. they require some help with some mobility maneuvers. Scores over 14  Generally these patients are able to perform mobility maneuvers alone and safely  and are independent in basic ADL. G codes: In compliance with CMSs Claims Based Outcome Reporting, the following G-code set was chosen for this patient based on their primary functional limitation being treated: The outcome measure chosen to determine the severity of the functional limitation was the Elderly mobility Scale with a score of 9/20 which was correlated with the impairment scale. · Mobility - Walking and Moving Around:               - CURRENT STATUS:    CK - 40%-59% impaired, limited or restricted               - GOAL STATUS:           CJ - 20%-39% impaired, limited or restricted               - D/C STATUS:                       ---------------To be determined---------------      Physical Therapy Evaluation Charge Determination   History Examination Presentation Decision-Making   MEDIUM  Complexity : 1-2 comorbidities / personal factors will impact the outcome/ POC  LOW Complexity : 1-2 Standardized tests and measures addressing body structure, function, activity limitation and / or participation in recreation  MEDIUM Complexity : Evolving with changing characteristics  MEDIUM Complexity : FOTO score of 26-74      Based on the above components, the patient evaluation is determined to be of the following complexity level: MEDIUM     Pain:  Pain Scale 1: Numeric (0 - 10)  Pain Intensity 1: 0              Activity Tolerance:   Fair; vital signs stable  Please refer to the flowsheet for vital signs taken during this treatment.   After treatment:   [X]         Patient left in no apparent distress sitting up in chair  [ ]         Patient left in no apparent distress in bed  [X]         Call bell left within reach  [X]         Nursing notified  [ ]         Caregiver present  [X]         Bed alarm activated      COMMUNICATION/EDUCATION:   The patients plan of care was discussed with: Registered Nurse.  [X]         Fall prevention education was provided and the patient/caregiver indicated understanding. [ ]         Patient/family have participated as able in goal setting and plan of care. [X]         Patient/family agree to work toward stated goals and plan of care. [ ]         Patient understands intent and goals of therapy, but is neutral about his/her participation. [ ]         Patient is unable to participate in goal setting and plan of care.      Thank you for this referral.  Lynne Rasmussen, PT   Time Calculation: 21 mins

## 2017-04-09 NOTE — PROGRESS NOTES
Labs stable  UOP stable  Continue with current care. Will see again in AM  Please call with any questions.

## 2017-04-09 NOTE — PROGRESS NOTES
PCU SHIFT NURSING NOTE      Bedside shift change report given to Roland Colvin (oncoming nurse) by Freda Soto RN (offgoing nurse). Report included the following information SBAR, Kardex, ED Summary, Procedure Summary, Intake/Output, MAR and Recent Results. Shift Summary:   1930: VSS and no complaints of pain at this time. Right triple lumen IJ in place. Steri strips to left PM site. Pineda taken out 3pm, informed patient to try and void by 2200.     0300: Vitals remain stable. Patient had voided twice throughout the night. Confused when I first entered the room about where he was and stated he was hearing dogs barking, reoriented easily. Blood drawn from right IJ, all three lumens with positive blood return. 0600: Patient moved to room 2262.     0700: Bedside shift change report given to [address] (oncoming nurse) by [place] (offgoing nurse). Report included the following information SBAR, Kardex, ED Summary, Procedure Summary, Intake/Output, MAR and Recent Results. Admission Date 4/7/2017   Admission Diagnosis Acute respiratory failure with hypoxia ([facility] [address]   Consults IP CONSULT TO CARDIOLOGY  IP CONSULT TO NEPHROLOGY        Consults   []PT   []OT   []Speech   []Case Management      [] Palliative      Cardiac Monitoring Order   []Yes   []No     IV drips   []Yes    Drip:                            Dose:  Drip:                            Dose:  Drip:                            Dose:   []No     GI Prophylaxis   []Yes   []No         DVT Prophylaxis   SCDs:             Glenroy stockings:         [] Medication   []Contraindicated   []None      Activity Level Activity Level: Up with Assistance     Activity Assistance: Partial (two people)   Purposeful Rounding every 1-2 hour?    []Yes   [place] Score  Total Score: 5   Bed Alarm (If score 3 or >)   []Yes   [] Refused (See signed refusal form in chart)   Presley Score  Presley Score: 18   Presley Score (if score 14 or less)   []PMT consult   []Wound Care consult []Specialty bed   [] Nutrition consult          Needs prior to discharge:   Home O2 required:    []Yes   []No    If yes, how much O2 required? Other:    Last Bowel Movement: Last Bowel Movement Date: 04/09/17      Influenza Vaccine          Pneumonia Vaccine           Diet Active Orders   Diet    DIET CARDIAC Regular      LDAs             Venous Access Device R IJ 04/07/17 (Active)   Central Line Being Utilized Yes 4/9/2017  8:00 AM   Criteria for Appropriate Use Hemodynamically unstable, requiring monitoring lines, vasopressors, or volume resuscitation 4/9/2017  8:00 AM   Site Assessment Clean, dry, & intact 4/9/2017  8:00 AM   Date of Last Dressing Change 04/07/17 4/9/2017  8:00 AM   Dressing Status Clean, dry, & intact 4/9/2017  8:00 AM   Dressing Type Transparent;Disk with Chlorhexadine gluconate (CHG) 4/9/2017  8:00 AM                        Urinary Catheter [REMOVED] Urinary Catheter 04/07/17 Pineda-Criteria for Appropriate Use: Medically/surgically unstable    Intake & Output   Date 04/08/17 1900 - 04/09/17 0659 04/09/17 0700 - 04/10/17 0659   Shift 8605-5164 24 Hour Total 0584-7356 9429-3041 24 Hour Total   I  N  T  A  K  E   P.O.  480 360  360      P. O.  480 360  360    I.V.  (mL/kg/hr)  66.2         DOBUTamine Volume  66.2       Shift Total  (mL/kg)  546.2  (7.6) 360  (5)  360  (5)   O  U  T  P  U  T   Urine  (mL/kg/hr) 600 2600 525  (0.6)  525      Urine Output (mL) ([REMOVED] Urinary Catheter 04/07/17 Pineda) 600 2600 525  525    Stool           Stool Occurrence(s)  1 x       Shift Total  (mL/kg) 600  (8.3) 2600  (36.1) 525  (7.3)  525  (7.3)   NET -600 -2053.8 -165  -165   Weight (kg) 72.1 72.1 72.1 72.1 72.1         Readmission Risk Assessment Tool Score Medium Risk            16       Total Score        3 Relationship with PCP    4 More than 1 Admission in calendar year    5 Patient Insurance is Medicare, Medicaid or Self Pay    4 Charlson Comorbidity Score        Criteria that do not apply: Patient Living Status    Patient Length of Stay > 5       Expected Length of Stay - - -   Actual Length of Stay 2

## 2017-04-09 NOTE — PROGRESS NOTES
Hospitalist Progress Note    NAME: Blayne Valencia   :  1945   MRN:  [id number]       Interim Hospital Summary: 66 y.o. male whom presented on 2017 with      Assessment / Plan:  Acute respiratory failure with hypoxia POA  Due to Systolic CHF, acute on chronic (EF 20-25%) s/p AICD  VTach  HTN  -dobutamin gtt discontinued  -CXR with left pleural effusion, probnp ~11,000 with +2 pitting edema  -echo with EF 20-25%, with diffuse hypokinesis and RV vol overload  -continue iv lasix, adding low dose BB, mexilitine, quinidine, amiodarone, ASA  -I&O  -PT/OT/cm-rehab  -cardiology following     LANDEN with baseline CKD3, resolved  Hyperkalemia, resolved  -pt admits to taking excessive K suppl  -s/p Kayexalate, Insulin/Dextrose, Calcium Gluconate  -nephro consulted in ED     Acute Encephalopathy POA with possible baseline dementia   -hx of hallucination for months  -mentation back to baseline  -ABG without CO2 retention        Code Status: Full  Surrogate Decision Maker: Brother Isaiah Kirkland     DVT Prophylaxis: SQ heparin     Subjective:     Chief Complaint / Reason for Physician Visit  No acute complaints. Discussed with RN events overnight. Review of Systems:  Symptom Y/N Comments  Symptom Y/N Comments   Fever/Chills n   Chest Pain n    Poor Appetite n   Edema n    Cough n   Abdominal Pain n    Sputum n   Joint Pain n    SOB/HARRIS n   Pruritis/Rash     Nausea/vomit    Tolerating PT/OT     Diarrhea    Tolerating Diet     Constipation    Other       Could NOT obtain due to:      Objective:     VITALS:   Last 24hrs VS reviewed since prior progress note.  Most recent are:  Patient Vitals for the past 24 hrs:   Temp Pulse Resp BP SpO2   17 0402 98.1 °F (36.7 °C) 78 18 117/63 92 %   17 0005 98.2 °F (36.8 °C) 83 18 105/53 92 %   17 2118 98 °F (36.7 °C) - 18 115/55 98 %   17 1600 98 °F (36.7 °C) 81 18 108/55 99 %   17 1500 - 82 - 107/52 -   17 1453 - 79 - 99/50 -   17 1407 - 82 - 94/57 -   04/08/17 1400 - 81 - 93/55 -   04/08/17 1100 - 77 - 121/63 98 %   04/08/17 1044 - 74 - - 100 %   04/08/17 1038 - 72 - 132/62 100 %       Intake/Output Summary (Last 24 hours) at 04/09/17 1010  Last data filed at 04/09/17 0438   Gross per 24 hour   Intake           546.17 ml   Output             2125 ml   Net         -1578.83 ml        PHYSICAL EXAM:  General: WD, WN. Alert, cooperative, no acute distress    EENT:  EOMI. Anicteric sclerae. MMM  Resp:  Diminished breath sound at lung base,  no wheezing. No accessory muscle use  CV:  Regular  rhythm,  +2 edema bl LE  GI:  Soft, Non distended, Non tender.  +Bowel sounds  Neurologic:  Alert and oriented X 3, normal speech,   Psych:   Good insight. Not anxious nor agitated  Skin:  No rashes. No jaundice    Reviewed most current lab test results and cultures  YES  Reviewed most current radiology test results   YES  Review and summation of old records today    NO  Reviewed patient's current orders and MAR    YES  PMH/SH reviewed - no change compared to H&P  ________________________________________________________________________  Care Plan discussed with:    Comments   Patient x    Family      RN x    Care Manager     Consultant                        Multidiciplinary team rounds were held today with , nursing, pharmacist and clinical coordinator. Patient's plan of care was discussed; medications were reviewed and discharge planning was addressed.      ________________________________________________________________________  Total NON critical care TIME:  35   Minutes    Total CRITICAL CARE TIME Spent:   Minutes non procedure based      Comments   >50% of visit spent in counseling and coordination of care     ________________________________________________________________________  Kush Starr MD     Procedures: see electronic medical records for all procedures/Xrays and details which were not copied into this note but were reviewed prior to creation of Plan. LABS:  I reviewed today's most current labs and imaging studies. Pertinent labs include:  Recent Labs      04/08/17   1027  04/08/17   0430  04/07/17   1635   WBC  7.1  7.6  8.3   HGB  10.0*  10.0*  12.0*   HCT  31.2*  30.9*  38.1   PLT  168  180  229     Recent Labs      04/09/17   0422  04/08/17   1027  04/08/17   0430  04/08/17   0219   04/07/17   1750   NA  145  142  144  144  144   < >  138   K  4.0  3.9  4.1  4.6  5.1   < >  6.9*   CL  104  101  105  104  104   < >  101   CO2  29  32  31  30  30   < >  30   GLU  63*  67  165*  50*  51*   < >  184*   BUN  30*  33*  39*  43*  45*   < >  52*   CREA  1.01  0.99  1.04  1.19  1.36*   < >  1.61*   CA  8.1*  8.0*  7.8*  8.3*  8.5   < >  8.6   MG  1.9   --    --    --    --    --    PHOS  3.4   --    --   4.5   --    --    ALB  2.4*  2.3*  2.6*  2.6*   --   2.8*   TBILI   --   0.5  0.6   --    --   0.4   SGOT   --   53*  58*   --    --   75*   ALT   --   83*  91*   --    --   103*    < > = values in this interval not displayed.        Signed: Carleen Koyanagi, MD

## 2017-04-10 LAB
ANION GAP BLD CALC-SCNC: 8 MMOL/L (ref 5–15)
BUN SERPL-MCNC: 26 MG/DL (ref 6–20)
BUN/CREAT SERPL: 31 (ref 12–20)
CALCIUM SERPL-MCNC: 7.6 MG/DL (ref 8.5–10.1)
CHLORIDE SERPL-SCNC: 104 MMOL/L (ref 97–108)
CO2 SERPL-SCNC: 31 MMOL/L (ref 21–32)
CREAT SERPL-MCNC: 0.83 MG/DL (ref 0.7–1.3)
GLUCOSE BLD STRIP.AUTO-MCNC: 111 MG/DL (ref 65–100)
GLUCOSE BLD STRIP.AUTO-MCNC: 178 MG/DL (ref 65–100)
GLUCOSE BLD STRIP.AUTO-MCNC: 258 MG/DL (ref 65–100)
GLUCOSE BLD STRIP.AUTO-MCNC: 384 MG/DL (ref 65–100)
GLUCOSE SERPL-MCNC: 81 MG/DL (ref 65–100)
POTASSIUM SERPL-SCNC: 4.2 MMOL/L (ref 3.5–5.1)
SERVICE CMNT-IMP: ABNORMAL
SODIUM SERPL-SCNC: 143 MMOL/L (ref 136–145)

## 2017-04-10 PROCEDURE — 97535 SELF CARE MNGMENT TRAINING: CPT | Performed by: OCCUPATIONAL THERAPIST

## 2017-04-10 PROCEDURE — 97530 THERAPEUTIC ACTIVITIES: CPT

## 2017-04-10 PROCEDURE — 74011250636 HC RX REV CODE- 250/636: Performed by: INTERNAL MEDICINE

## 2017-04-10 PROCEDURE — 74011250637 HC RX REV CODE- 250/637: Performed by: INTERNAL MEDICINE

## 2017-04-10 PROCEDURE — 36415 COLL VENOUS BLD VENIPUNCTURE: CPT | Performed by: INTERNAL MEDICINE

## 2017-04-10 PROCEDURE — 97530 THERAPEUTIC ACTIVITIES: CPT | Performed by: OCCUPATIONAL THERAPIST

## 2017-04-10 PROCEDURE — 82962 GLUCOSE BLOOD TEST: CPT

## 2017-04-10 PROCEDURE — 76450000000

## 2017-04-10 PROCEDURE — 65660000000 HC RM CCU STEPDOWN

## 2017-04-10 PROCEDURE — 80048 BASIC METABOLIC PNL TOTAL CA: CPT | Performed by: INTERNAL MEDICINE

## 2017-04-10 PROCEDURE — 97165 OT EVAL LOW COMPLEX 30 MIN: CPT | Performed by: OCCUPATIONAL THERAPIST

## 2017-04-10 PROCEDURE — 74011636637 HC RX REV CODE- 636/637: Performed by: INTERNAL MEDICINE

## 2017-04-10 RX ORDER — FUROSEMIDE 20 MG/1
20 TABLET ORAL DAILY
Status: DISCONTINUED | OUTPATIENT
Start: 2017-04-11 | End: 2017-04-11 | Stop reason: HOSPADM

## 2017-04-10 RX ADMIN — Medication 10 ML: at 18:43

## 2017-04-10 RX ADMIN — Medication 10 ML: at 21:13

## 2017-04-10 RX ADMIN — AMIODARONE HYDROCHLORIDE 200 MG: 200 TABLET ORAL at 08:49

## 2017-04-10 RX ADMIN — MEXILETINE HYDROCHLORIDE 150 MG: 150 CAPSULE ORAL at 10:41

## 2017-04-10 RX ADMIN — INSULIN LISPRO 2 UNITS: 100 INJECTION, SOLUTION INTRAVENOUS; SUBCUTANEOUS at 21:13

## 2017-04-10 RX ADMIN — INSULIN LISPRO 7 UNITS: 100 INJECTION, SOLUTION INTRAVENOUS; SUBCUTANEOUS at 11:23

## 2017-04-10 RX ADMIN — QUINIDINE GLUCONATE 324 MG: 324 TABLET, EXTENDED RELEASE ORAL at 10:41

## 2017-04-10 RX ADMIN — FUROSEMIDE 20 MG: 10 INJECTION, SOLUTION INTRAMUSCULAR; INTRAVENOUS at 08:47

## 2017-04-10 RX ADMIN — HEPARIN SODIUM 5000 UNITS: 5000 INJECTION, SOLUTION INTRAVENOUS; SUBCUTANEOUS at 05:49

## 2017-04-10 RX ADMIN — HEPARIN SODIUM 5000 UNITS: 5000 INJECTION, SOLUTION INTRAVENOUS; SUBCUTANEOUS at 11:23

## 2017-04-10 RX ADMIN — ASPIRIN 81 MG CHEWABLE TABLET 81 MG: 81 TABLET CHEWABLE at 08:48

## 2017-04-10 RX ADMIN — INSULIN LISPRO 2 UNITS: 100 INJECTION, SOLUTION INTRAVENOUS; SUBCUTANEOUS at 18:40

## 2017-04-10 RX ADMIN — MEXILETINE HYDROCHLORIDE 150 MG: 150 CAPSULE ORAL at 18:41

## 2017-04-10 RX ADMIN — HEPARIN SODIUM 5000 UNITS: 5000 INJECTION, SOLUTION INTRAVENOUS; SUBCUTANEOUS at 21:14

## 2017-04-10 RX ADMIN — METOPROLOL SUCCINATE 12.5 MG: 25 TABLET, EXTENDED RELEASE ORAL at 08:48

## 2017-04-10 NOTE — PROGRESS NOTES
[facility] patient. [facility] can provide service. Confirmed with Adm Alma. Director. Palliative Care Consult pending. Anticipate d/c 4/11/2017. Patient will require medical transport at time of discharge due to Fall Risk, generalized  weakness, decreased activity tolerance.     Vijay Bunch RN CM  Ext [phone]

## 2017-04-10 NOTE — PROGRESS NOTES
PCU SHIFT NURSING NOTE      Bedside and Verbal shift change report given to Angel Nina (oncoming nurse) by Shavonne Alicia (offgoing nurse). Report included the following information SBAR, Kardex, Intake/Output, Accordion, Recent Results and Cardiac Rhythm NSR. Shift Summary:         Admission Date 4/7/2017   Admission Diagnosis Acute respiratory failure with hypoxia ([address]   Consults IP CONSULT TO CARDIOLOGY  IP CONSULT TO NEPHROLOGY  IP CONSULT TO PALLIATIVE CARE - PROVIDER        Consults   [x]PT   [x]OT   []Speech   [x]Case Management      [x] Palliative      Cardiac Monitoring Order   [x]Yes   []No     IV drips   []Yes    Drip:                            Dose:  Drip:                            Dose:  Drip:                            Dose:   [x]No     GI Prophylaxis   []Yes   [x]No         DVT Prophylaxis   SCDs:             Glenroy stockings:         [x] Medication   []Contraindicated   []None      Activity Level Activity Level: Up with Assistance     Activity Assistance: Partial (two people)   Purposeful Rounding every 1-2 hour? [x]Yes   [place] Score  Total Score: 5   Bed Alarm (If score 3 or >)   []Yes   [] Refused (See signed refusal form in chart)   Presley Score  Presley Score: 18   Presley Score (if score 14 or less)   []PMT consult   []Wound Care consult      []Specialty bed   [] Nutrition consult          Needs prior to discharge:   Home O2 required:    []Yes   [x]No    If yes, how much O2 required?     Other:    Last Bowel Movement: Last Bowel Movement Date: 04/09/17      Influenza Vaccine Received Flu Vaccine for Current Season (usually Sept-March): Yes        Pneumonia Vaccine           Diet Active Orders   Diet    DIET CARDIAC Regular      LDAs             Venous Access Device R IJ 04/07/17 (Active)   Central Line Being Utilized Yes 4/10/2017  3:00 PM   Criteria for Appropriate Use Hemodynamically unstable, requiring monitoring lines, vasopressors, or volume resuscitation 4/10/2017  3:00 PM   Site Assessment Clean, dry, & intact 4/10/2017  3:00 PM   Date of Last Dressing Change 04/07/17 4/10/2017  8:00 AM   Dressing Status Clean;Dry;Loose 4/10/2017  3:00 PM   Dressing Type Disk with Chlorhexadine gluconate (CHG) 4/10/2017  3:00 PM   Positive Blood Return (Medial Site) Yes 4/9/2017  7:28 PM   Positive Blood Return (Lateral Site) Yes 4/9/2017  7:28 PM   Alcohol Cap Used Yes 4/10/2017  3:00 PM                        Urinary Catheter [REMOVED] Urinary Catheter 04/07/17 Pineda-Criteria for Appropriate Use: Medically/surgically unstable    Intake & Output   Date 04/09/17 1900 - 04/10/17 0659 04/10/17 0700 - 04/11/17 0659   Shift 4816-1997 24 Hour Total 8804-8361 9902-4820 24 Hour Total   I  N  T  A  K  E   P.O.  360 720  720      P. O.  360 720  720    Shift Total  (mL/kg)  360  (4.9) 720  (9.8)  720  (9.8)   O  U  T  P  U  T   Urine  (mL/kg/hr) 500 1025 1275  (1.4)  1275      Urine Voided   1275      Urine Occurrence(s)   3 x  3 x      Urine Output (mL) ([REMOVED] Urinary Catheter 04/07/17 Pineda)  525       Stool           Stool Occurrence(s)   1 x  1 x    Shift Total  (mL/kg) 500  (6.8) 1025  (13.9) 1275  (17.3)  1275  (17.3)   NET -500 -665 -555  -555   Weight (kg) 73.5 73.5 73.5 73.5 73.5         Readmission Risk Assessment Tool Score Medium Risk            16       Total Score        3 Relationship with PCP    4 More than 1 Admission in calendar year    5 Patient Insurance is Medicare, Medicaid or Self Pay    4 Charlson Comorbidity Score        Criteria that do not apply:    Patient Living Status    Patient Length of Stay > 5       Expected Length of Stay 4d 14h   Actual Length of Stay 3

## 2017-04-10 NOTE — PROGRESS NOTES
Cm contacted [facility]. They do not have patient registered with them for services. Referred CM to [facility] in [place]. [phone]  CM left message on voicemail to confirm services. Cm will continue to follow.     Isabel Galvan RN CM  Ext [phone]

## 2017-04-10 NOTE — PROGRESS NOTES
Problem: Self Care Deficits Care Plan (Adult)  Goal: *Acute Goals and Plan of Care (Insert Text)  Occupational Therapy Goals  Initiated 4/10/2017  1. Patient will perform grooming with supervision/set-up standing at the sink within 7 day(s). 2. Patient will perform lower body dressing with modified independence and without loss of balance within 7 day(s). 3. Patient will perform simple home management with supervision/set-up within 7 day(s). 4. Patient will perform toilet transfers with supervision/set-up within 7 day(s). 5. Patient will perform all aspects of toileting with modified independence within 7 day(s). 6. Patient will demonstrate safety awareness during ADL mobility with rolling walker and basic ADLs with min cues within 7 day(s). OCCUPATIONAL THERAPY EVALUATION  Patient: David Silver (2 y.o. male)  Date: 4/10/2017  Primary Diagnosis: Acute respiratory failure with hypoxia [facility])        Precautions:   Fall      ASSESSMENT :  Based on the objective data described below, the patient presents with insight into deficits and is aware his balance is poor and he is weak, however due to recent admission to SNF he is adamant on going home and getting stronger there. Although he voices concern over fall risk. If he goes home, instructed him to have assist with all ADL mobility, requires hands on physical assist to prevent fall. Patient not very receptive to skilled instruction and likes to do things his way. Will continue to follow. Would recommend rehab however he is likely to refuse to go. Patient will benefit from skilled intervention to address the above impairments.   Patients rehabilitation potential is considered to be Guarded  Factors which may influence rehabilitation potential include:   [ ]             None noted  [ ]             Mental ability/status  [ ]             Medical condition  [X]             Home/family situation and support systems  [X]             Safety awareness  [ ] Pain tolerance/management  [ ]             Other:        PLAN :  Recommendations and Planned Interventions:  [X]               Self Care Training                  [X]        Therapeutic Activities  [ ]               Functional Mobility Training    [ ]        Cognitive Retraining  [X]               Therapeutic Exercises           [X]        Endurance Activities  [X]               Balance Training                   [ ]        Neuromuscular Re-Education  [ ]               Visual/Perceptual Training     [X]   Home Safety Training  [X]               Patient Education                 [X]        Family Training/Education  [ ]               Other (comment):     Frequency/Duration: Patient will be followed by occupational therapy 5 times a week to address goals. Discharge Recommendations: would benefit from rehab but refusing to go, so home health  Further Equipment Recommendations for Discharge: none       SUBJECTIVE:   Patient stated If I don't fall I'll be alright.       OBJECTIVE DATA SUMMARY:   HISTORY:   Past Medical History:   Diagnosis Date    Asthma       COPD    CAD (coronary artery disease)       AICD implanted March 2017    [facility] [address]      Heart failure ([facility] [address]       Past Surgical History:   Procedure Laterality Date    HX PACEMAKER            Prior Level of Function/Home Situation: recent admissions to hospital and SNF, has aides per patient 12 hours per day, alone at night.  Aides assist IADL's and per patient with mobility, sponge bathes at times, admits to not being the cleanliest person   Expanded or extensive additional review of patient history:      Home Situation  Home Environment: Private residence  # Steps to Enter: 4  Rails to Enter: Yes  Hand Rails : Bilateral  One/Two Story Residence: One story  Living Alone: Yes  Support Systems: Home care staff  Patient Expects to be Discharged to[de-identified] Private residence  Current DME Used/Available at Home: Wheelchair, Walker, rolling, Grab bars  Tub or Shower Type: Other (comment) (sponge bathes)  [ ]  Right hand dominant   [ ]  Left hand dominant     EXAMINATION OF PERFORMANCE DEFICITS:  Cognitive/Behavioral Status:  Neurologic State: Alert  Orientation Level: Oriented X4  Cognition: Decreased command following; Impulsive;Poor safety awareness  Perception: Appears intact  Perseveration: No perseveration noted  Safety/Judgement: Awareness of environment;Decreased awareness of need for assistance; Lack of insight into deficits     Skin: thin, bruised     Edema: none noted     Hearing: Auditory  Auditory Impairment: None     Vision/Perceptual:                                Corrective Lenses: Glasses     Range of Motion:  AROM: Generally decreased, functional  PROM: Generally decreased, functional                       Strength:  Strength: Generally decreased, functional                 Coordination:  Coordination: Generally decreased, functional  Fine Motor Skills-Upper: Left Impaired;Right Impaired          Tone & Sensation:  Tone: Normal  Sensation: Impaired (bilateral feet)                       Balance:  Sitting: Impaired  Sitting - Static: Good (unsupported)  Sitting - Dynamic: Fair (occasional)  Standing: Impaired; With support  Standing - Static: Constant support; Fair  Standing - Dynamic : Poor     Functional Mobility and Transfers for ADLs:  Bed Mobility:        Transfers:  Sit to Stand: Minimum assistance; Adaptive equipment; Additional time  Stand to Sit: Minimum assistance; Adaptive equipment;Assist x1 (safety concern)  Bed to Chair: Minimum assistance; Adaptive equipment;Assist x1;Additional time  Toilet Transfer : Minimum assistance; Adaptive equipment;Assist x1     ADL Assessment:  Feeding: Modified independent     Oral Facial Hygiene/Grooming: Setup;Supervision     Bathing: Minimum assistance     Upper Body Dressing: Setup     Lower Body Dressing: Minimum assistance;Assist x1     Toileting: Minimum assistance; Adaptive equipment                 ADL Intervention and task modifications:     Educated on role of OT, fall prevention, home safety, safe positioning and maneuvering of rolling walker in general and in small space/bathroom, educated on safety related to toileting at night, need for hands on assist. Instructed on use of call bell and need for assist with all mobility     Patient instructed and indicated understanding the benefits of maintaining activity tolerance, functional mobility, and independence with self care tasks during acute stay  to ensure safe return home and to baseline. Encouraged patient to increase frequency and duration OOB, be out of bed for all meals, perform daily ADLs (as approved by RN/MD regarding bathing etc), and performing functional mobility to/from bathroom. Cognitive Retraining  Safety/Judgement: Awareness of environment;Decreased awareness of need for assistance; Lack of insight into deficits     Therapeutic Exercise: Instructed to perform UE and LE AROM exercises, increase time seated unsupported to increase demand on trunk/core muscles   Functional Measure:  Barthel Index:      Bathin  Bladder: 10  Bowels: 10  Groomin  Dressin  Feeding: 10  Mobility: 0  Stairs: 0  Toilet Use: 5  Transfer (Bed to Chair and Back): 10  Total: 55         Barthel and G-code impairment scale:  Percentage of impairment CH  0% CI  1-19% CJ  20-39% CK  40-59% CL  60-79% CM  80-99% CN  100%   Barthel Score 0-100 100 99-80 79-60 59-40 20-39 1-19    0   Barthel Score 0-20 20 17-19 13-16 9-12 5-8 1-4 0      The Barthel ADL Index: Guidelines  1. The index should be used as a record of what a patient does, not as a record of what a patient could do. 2. The main aim is to establish degree of independence from any help, physical or verbal, however minor and for whatever reason. 3. The need for supervision renders the patient not independent.   4. A patient's performance should be established using the best available evidence. Asking the patient, friends/relatives and nurses are the usual sources, but direct observation and common sense are also important. However direct testing is not needed. 5. Usually the patient's performance over the preceding 24-48 hours is important, but occasionally longer periods will be relevant. 6. Middle categories imply that the patient supplies over 50 per cent of the effort. 7. Use of aids to be independent is allowed. Juliette Landis., Barthel, D.W. (5923). Functional evaluation: the Barthel Index. [address] [facility] (14)2. Hermelinda Brown wil MARCOS Vergara, Haresh Monge., Drea Christy., [place], [address] (1999). Measuring the change indisability after inpatient rehabilitation; comparison of the responsiveness of the Barthel Index and Functional Edwards Measure. Journal of Neurology, Neurosurgery, and Psychiatry, 66(4), 293-739. Suyapa oYst, NMercyJ.A, DANIKA Chen, & Jerry Claudio M.A. (2004.) Assessment of post-stroke quality of life in cost-effectiveness studies: The usefulness of the Barthel Index and the EuroQoL-5D. Quality of Life Research, 13, 041-26            G codes: In compliance with CMSs Claims Based Outcome Reporting, the following G-code set was chosen for this patient based on their primary functional limitation being treated: The outcome measure chosen to determine the severity of the functional limitation was the Barthel Index with a score of 55/100 which was correlated with the impairment scale.       · Self Care:               - CURRENT STATUS:    CK - 40%-59% impaired, limited or restricted               - GOAL STATUS:           CJ - 20%-39% impaired, limited or restricted               - D/C STATUS:                       ---------------To be determined---------------      Occupational Therapy Evaluation Charge Determination   History Examination Decision-Making   LOW Complexity : Brief history review  LOW Complexity : 1-3 performance deficits relating to physical, cognitive , or psychosocial skils that result in activity limitations and / or participation restrictions  LOW Complexity : No comorbidities that affect functional and no verbal or physical assistance needed to complete eval tasks       Based on the above components, the patient evaluation is determined to be of the following complexity level: LOW   Pain:  Pain Scale 1: Numeric (0 - 10)  Pain Intensity 1: 0      no complaint of pain        Activity Tolerance:   Fair   Please refer to the flowsheet for vital signs taken during this treatment. After treatment:   [X] Patient left in no apparent distress sitting up in chair  [ ] Patient left in no apparent distress in bed  [X] Call bell left within reach  [X] Nursing notified  [ ] Caregiver present  [X] Bed alarm activated      COMMUNICATION/EDUCATION:   The patients plan of care was discussed with: Registered Nurse.  [X] Home safety education was provided and the patient/caregiver indicated understanding. [X] Patient/family have participated as able in goal setting and plan of care. [ ] Patient/family agree to work toward stated goals and plan of care. [ ] Patient understands intent and goals of therapy, but is neutral about his/her participation. [ ] Patient is unable to participate in goal setting and plan of care. This patients plan of care is appropriate for delegation to [facility].      Thank you for this referral.  Leonel Almonte OTR/L  Time Calculation: 32 mins

## 2017-04-10 NOTE — PROGRESS NOTES
Heart Failure Care Coordinator visited patient in room. Introduction to self as well as reinforcement of the Care Coordinator role provided.  Provided an opportunity for the patient to voice questions or concerns regarding the HF bundle program.

## 2017-04-10 NOTE — PROGRESS NOTES
Hospitalist Progress Note    NAME: Adolfo Mckay   :  1945   MRN:  [id number]       Interim Hospital Summary: 66 y.o. male whom presented on 2017 with      Assessment / Plan:  Acute respiratory failure with hypoxia POA  Due to Systolic CHF, acute on chronic (EF 20-25%) s/p AICD  VTach  HTN  -dobutamin gtt discontinued  -CXR with left pleural effusion, probnp ~11,000 with +2 pitting edema  -echo with EF 20-25%, with diffuse hypokinesis and RV vol overload  -transition to po lasix, BB, mexilitine, quinidine, amiodarone, ASA  -I&O  -PT/OT/cm-awaiting snf acceptance  -cardiology following     LANDEN with baseline CKD3, resolved  Hyperkalemia, resolved  -pt admits to taking excessive K suppl  -s/p Kayexalate, Insulin/Dextrose, Calcium Gluconate  -appreciate nephro following    Acute Encephalopathy POA with possible baseline dementia   -hx of hallucination for months, mentation back to baseline     Code Status: Full  Surrogate Decision Maker: Brother Padminibeulah Noguera     DVT Prophylaxis: SQ heparin     Subjective:     Chief Complaint / Reason for Physician Visit  No acute complaints. Discussed with RN events overnight. Review of Systems:  Symptom Y/N Comments  Symptom Y/N Comments   Fever/Chills n   Chest Pain n    Poor Appetite n   Edema n    Cough n   Abdominal Pain n    Sputum n   Joint Pain n    SOB/HARRIS n   Pruritis/Rash     Nausea/vomit    Tolerating PT/OT     Diarrhea    Tolerating Diet     Constipation    Other       Could NOT obtain due to:      Objective:     VITALS:   Last 24hrs VS reviewed since prior progress note.  Most recent are:  Patient Vitals for the past 24 hrs:   Temp Pulse Resp BP SpO2   04/10/17 1041 98.1 °F (36.7 °C) 83 20 106/59 94 %   04/10/17 0847 97.3 °F (36.3 °C) 86 20 139/89 93 %   04/10/17 0237 98.3 °F (36.8 °C) 77 20 112/62 93 %   17 2337 98.5 °F (36.9 °C) 83 19 114/63 93 %   17 1928 98.3 °F (36.8 °C) 89 20 108/53 92 %   17 1530 - 84 - 109/52 - Intake/Output Summary (Last 24 hours) at 04/10/17 1142  Last data filed at 04/10/17 0236   Gross per 24 hour   Intake                0 ml   Output             1025 ml   Net            -1025 ml        PHYSICAL EXAM:  General: WD, WN. Alert, cooperative, no acute distress    EENT:  EOMI. Anicteric sclerae. MMM  Resp:  CTA b/l,  no wheezing. No accessory muscle use  CV:  Regular  rhythm,  + edema bl LE  GI:  Soft, Non distended, Non tender.  +Bowel sounds  Neurologic:  Alert and oriented X 3, normal speech,   Psych:   Good insight. Not anxious nor agitated  Skin:  No rashes. No jaundice    Reviewed most current lab test results and cultures  YES  Reviewed most current radiology test results   YES  Review and summation of old records today    NO  Reviewed patient's current orders and MAR    YES  PMH/SH reviewed - no change compared to H&P  ________________________________________________________________________  Care Plan discussed with:    Comments   Patient x    Family      RN x    Care Manager     Consultant                        Multidiciplinary team rounds were held today with , nursing, pharmacist and clinical coordinator. Patient's plan of care was discussed; medications were reviewed and discharge planning was addressed. ________________________________________________________________________  Total NON critical care TIME:  35   Minutes    Total CRITICAL CARE TIME Spent:   Minutes non procedure based      Comments   >50% of visit spent in counseling and coordination of care     ________________________________________________________________________  Maira Raza MD     Procedures: see electronic medical records for all procedures/Xrays and details which were not copied into this note but were reviewed prior to creation of Plan. LABS:  I reviewed today's most current labs and imaging studies.   Pertinent labs include:  Recent Labs      04/08/17   1027  04/08/17   0170  04/07/17   9226 WBC  7.1  7.6  8.3   HGB  10.0*  10.0*  12.0*   HCT  31.2*  30.9*  38.1   PLT  168  180  229     Recent Labs      04/10/17   0255  04/09/17   0422  04/08/17   1027  04/08/17   0430  04/08/17   0219   04/07/17   1750   NA  143  145  142  144  144  144   < >  138   K  4.2  4.0  3.9  4.1  4.6  5.1   < >  6.9*   CL  104  104  101  105  104  104   < >  101   CO2  31  29  32  31  30  30   < >  30   GLU  81  63*  67  165*  50*  51*   < >  184*   BUN  26*  30*  33*  39*  43*  45*   < >  52*   CREA  0.83  1.01  0.99  1.04  1.19  1.36*   < >  1.61*   CA  7.6*  8.1*  8.0*  7.8*  8.3*  8.5   < >  8.6   MG   --   1.9   --    --    --    --    --    PHOS   --   3.4   --    --   4.5   --    --    ALB   --   2.4*  2.3*  2.6*  2.6*   --   2.8*   TBILI   --    --   0.5  0.6   --    --   0.4   SGOT   --    --   53*  58*   --    --   75*   ALT   --    --   83*  91*   --    --   103*    < > = values in this interval not displayed.        Signed: Anjali Ham MD

## 2017-04-10 NOTE — PROGRESS NOTES
CM met with patient to discuss discharge planning. Patient confirmed name and  as patient identifiers. Demographics confirmed. Patient lives alone in a single story home with 5 steps for entry. When CM asked if he was able to safely get up and down the steps without assistance \"of course, I have been doing it for 30 years\". Patient reports being independent with ADL's prior to admission. Patient is not a good historian. He also talks about Home health in one sentence but then states he did not have home health before being admitted to hospital.      Patient reports being at [facility] and Rehab. He is agreeable to returning there for SNF rehab at time of discharge. CM spoke with Emergency Contact Jeannine Sanchez. Confirmed that patient lives alone. States that the last 6 months patient has become progressively weaker. Patient started using a walker one month ago. Patient was seen by [facility] on 2017 who called EMS for transport to hospital.  (confirmed by brother, Jeannine Sanchez)    Jeannine Goldberg states he has a copy of an AMD.  \"I just have to find it\". CM asked if he could provide copy at his next visit. Mr. Enedelia Salcido verbalized understanding. Both the patient and his brother have expressed concerns regarding possible side effects of weakness from the Amiodorone and Quinidine. CM informed MD.      Patient has a private duty assistant at home 8 hours a day, 7 days a week. Assists with home/farm chores. Care Management Interventions  PCP Verified by CM:  Yes  Transition of Care Consult (CM Consult): SNF  Physical Therapy Consult: Yes  Occupational Therapy Consult: Yes  Current Support Network: Lives Alone  Freedom of Choice Offered: Yes ([facility])  Discharge Location  Discharge Placement: Leonardo Coresa RN CM  Ext [phone]

## 2017-04-10 NOTE — PROGRESS NOTES
SNF referral submitted to [facility] and Rehab via [facility]. Cm will continue to follow.     Jeniffer Mabry RN CM  Ext [phone]

## 2017-04-10 NOTE — PROGRESS NOTES
Problem: Mobility Impaired (Adult and Pediatric)  Goal: *Acute Goals and Plan of Care (Insert Text)  Physical Therapy Goals  Initiated 4/9/2017  1. Patient will move from supine to sit and sit to supine in bed with independence within 7 day(s). 2. Patient will transfer from bed to chair and chair to bed with supervision/set-up using the least restrictive device within 7 day(s). 3. Patient will perform sit to stand with independence within 7 day(s). 4. Patient will ambulate with minimal assistance/contact guard assist for 150 feet with the least restrictive device within 7 day(s). 5. Patient will ascend/descend 4 stairs with 1 handrail(s) with minimal assistance/contact guard assist within 7 day(s). PHYSICAL THERAPY TREATMENT  Patient: Oziel Collins (19 y.o. male)  Date: 4/10/2017  Diagnosis: Acute respiratory failure with hypoxia (HCC) <principal problem not specified>       Precautions: Fall      ASSESSMENT:  Pt up in chair, lounged back with legs out straight in front of him, stating he doesn't feel like doing anything today. RA VSS. Pt refers to himself as \"we\" in conversation. Pt required much coaxing before agreeing to sit to stand at RW, mod assist x 2 needed & as soon as pt stood, he threw himself back into the chair, stating \" We can't stand up today \". Pt refused additional attempts, PT unable to accomplish 6 min walk test. Pt states he is now agreeable to SNF rehab. Progression toward goals:  [ ]    Improving appropriately and progressing toward goals  [X]    Improving slowly and progressing toward goals  [ ]    Not making progress toward goals and plan of care will be adjusted       PLAN:  Patient continues to benefit from skilled intervention to address the above impairments. Continue treatment per established plan of care. Discharge Recommendations:  SNF Rehab  Further Equipment Recommendations for Discharge:  TBD at rehab       SUBJECTIVE:   Patient stated We can't do it today.  The hospital has made me worse not better. Ector Swann      OBJECTIVE DATA SUMMARY:   Critical Behavior:  Neurologic State: Alert  Orientation Level: Oriented X4  Cognition: Decreased command following, Impulsive, Poor safety awareness  Safety/Judgement: Awareness of environment, Decreased awareness of need for assistance, Lack of insight into deficits  Functional Mobility Training:  Bed Mobility:            Up in chair        Transfers:  Sit to Stand: Additional time; Moderate assistance;Assist x2  Stand to Sit: Additional time; Moderate assistance;Assist x2        Bed to Chair: Minimum assistance; Adaptive equipment;Assist x1;Additional time                    Balance:  Sitting: Impaired  Sitting - Static: Fair (occasional)  Sitting - Dynamic: Fair (occasional)  Standing: Impaired; With support (RW, min assist x 2)  Standing - Static: Constant support; Fair  Standing - Dynamic :  (Unable to assess d/t lack of pt cooperation)  Ambulation/Gait Training:                 Pt declined  Pain:  Pain Scale 1: Numeric (0 - 10)  Pain Intensity 1: 0              Activity Tolerance:   Ra VSS  Please refer to the flowsheet for vital signs taken during this treatment.   After treatment:   [X]    Patient left in no apparent distress sitting up in chair  [ ]    Patient left in no apparent distress in bed  [X]    Call bell left within reach  [X]    Nursing notified  [ ]    Caregiver present  [X]    Bed alarm activated      COMMUNICATION/COLLABORATION:   The patients plan of care was discussed with: Registered Nurse and      Yamel Ansari, PT   Time Calculation: 15 mins

## 2017-04-10 NOTE — CARDIO/PULMONARY
Cardiopulmonary Rehab: Pt is on CHF bundle list     Chart reviewed. Pt is a 66 y.o. male admitted with acute respiratory failure with hypoxia due to systolic CHF. Pt with AICD. PMH includes CKD III, HTN, DM, CAD, and former smoker. ECHO indicated LVEF 20-25%. DIET CARDIAC Regular     Met with pt who is anxious to be discharged. Sitting up in chair. States he is supposed to go to [facility] and they do not use salt in the food there. States \"All we talk about is weighing\" I explained that weighing daily can help catch fluid overload sometimes two weeks in advance. He seemed disinterested and rolled his eyes. This was a follow-up visit to answer questions and reinforce prior teaching re: CHF, S&Ss, medication management, Low NA diet, daily weights, when to call the doctor and balancing rest/activity. No questions for me. Understanding verbalized.

## 2017-04-10 NOTE — PROGRESS NOTES
Deisi Thomas, RN Nurse Navigator CHF informed CM that patient is in need of Palliative Care consult. Cm reviewed with MD.    Vicky Gomes RN will place Palliative Care consult. 80  Cm will continue to follow.     Isis Ansari RN CM  ext

## 2017-04-10 NOTE — DIABETES MGMT
DTC Progress Note    Recommendations/ Comments: If appropriate, please consider resuming pt home med, Januvia, using hospital formulary Tradjenta 5mg daily. Once PO intakes consistently > 50% may consider resuming home medication Glimepiride 4mg ac breakfast to aid in glucose control. Chart reviewed on Marie Dhaliwal for hyperglycemia. Patient is a 66 y.o. male with known history of Type 2 Diabetes on Metformin 1,000mg bid, Glimepiride 4mg qam, and Januvia 100mg daily at home. A1c:   No results found for: HBA1C, HGBE8, WSH7LICS    Recent Glucose Results: Lab Results   Component Value Date/Time    GLU 81 04/10/2017 02:55 AM    GLUCPOC 111 (H) 04/10/2017 07:36 AM    GLUCPOC 238 (H) 04/09/2017 09:06 PM    GLUCPOC 234 (H) 04/09/2017 05:40 PM        Lab Results   Component Value Date/Time    Creatinine 0.83 04/10/2017 02:55 AM       Active Orders   Diet    DIET CARDIAC Regular        PO intake: Patient Vitals for the past 72 hrs:   % Diet Eaten   04/09/17 1125 25 %   04/09/17 0706 50 %   04/08/17 1856 50 %   04/08/17 1409 90 %       Current hospital DM medication:   -Humalog normal sensitivity correction      Will continue to follow as needed.     Thank you    Smitha De León, [facility]  Office: [phone]

## 2017-04-10 NOTE — PROGRESS NOTES
[email or web address]                  Phone - [phone]   Renal Progress Note    Subjective:   Patient: Blayne Valencia                    YOB: 1945               Date- 4/10/2017          Reason for visit: HYPERKALEMIA. RENAL FAILURE. Admission Date: 2017            Hyperkalemia:  - 2/2 mild LANDEN vs excess intake - was taking more than his prescribed KCl at home  - resolved with medical therapy  - compliance with his medications has been discussed.     LANDEN on CKD:  - appeared to have CKD III at baseline but creat today is down to 0.8  -      CKD III ?   - 2/2 DM2 and HTN  - Creat better than usual.     CHF EF 20-25%:  - off dobutamine     HTN:  - BP stable     Afib:  - on oral amio          Complaint:   Feeling relatively okay. Says he has chronic HARRIS but is not sure how that is doing since he has not been walking here in the hospital.      Review of Systems  Constitutional: negative  Ears, nose, mouth, throat, and face: negative  Respiratory: negative at present but pt generally has HARRIS  Cardiovascular: negative  Gastrointestinal: negative  Musculoskeletal:negative  Neurological: negative  Skin: no rashes    Objective:     Visit Vitals    /59 (BP 1 Location: Left arm, BP Patient Position: At rest;Sitting)    Pulse 83    Temp 98.1 °F (36.7 °C)    Resp 20    Ht 6' 3\" (1.905 m)    Wt 73.5 kg (162 lb 1.6 oz)    SpO2 94%    BMI 20.26 kg/m2     Temp (24hrs), Av.1 °F (36.7 °C), Min:97.3 °F (36.3 °C), Max:98.5 °F (36.9 °C)          1901 - 04/10 0700  In: 360 [P.O.:360]  Out: 1625 [Urine:1625]    Physical Exam:  General:  alert, cooperative, no distress  Neurologic:  grossly non-focal  Neck:  neck supple and symmetrical.   Lungs:  diminished breath sounds but no rales, rhonchi or wheezes  Heart:  regular rate and rhythm  Skin:  No rashes noted  Musculoskeletal: grossly unremarkable  Abdomen:  soft, non-tender.    Extremities/Edema: trace - 1+ edema    Data Review: Recent Labs      04/10/17   0255  04/09/17   0422  04/08/17   1027  04/08/17   0430  04/08/17   0219   04/07/17   1635   WBC   --    --   7.1  7.6   --    --   8.3   HGB   --    --   10.0*  10.0*   --    --   12.0*   NA  143  145  142  144  144  144   < >  137   K  4.2  4.0  3.9  4.1  4.6  5.1   < >  HEMOLYZED,RECOLLECT REQUESTED   CL  104  104  101  105  104  104   < >  102   CO2  31  29  32  31  30  30   < >  24   BUN  26*  30*  33*  39*  43*  45*   < >  51*   CREA  0.83  1.01  0.99  1.04  1.19  1.36*   < >  1.62*   CA  7.6*  8.1*  8.0*  7.8*  8.3*  8.5   < >  8.6   ALB   --   2.4*  2.3*  2.6*  2.6*   < >  3.0*   PHOS   --   3.4   --    --   4.5   --    --    MG   --   1.9   --    --    --    --    --     < > = values in this interval not displayed. Assessment:     Active Problems:    Acute respiratory failure with hypoxia (HCC) (4/3/7421)      Systolic CHF, acute on chronic ([facility] (4/7/2017)      LANDEN (acute kidney injury) ([facility] (4/7/2017)      HTN (hypertension) (4/7/2017)      V-tach ([facility] (4/7/2017)      AICD (automatic cardioverter/defibrillator) present (4/7/2017)    Chart reviewed. Pertinent Notes Reviewed. Medications list Personally Reviewed. Tommy November, [address] [facility]  [facility] [address], [place], [address]  Phone - [phone]    Fax - [phone]  [email or web address]                                         [facility]   [address] [address], [facility]  Phone - [phone]  Fax - [phone]. [facility]

## 2017-04-10 NOTE — PROGRESS NOTES
Palliative Medicine Social Work     Progress Note:    Elizabeth Goyal is a 66 y.o.male brought to ED because [address] [place] [address] for ambulance after pt found slumped over the chair. Pt discharged from [facility] on 3/1/16. According to his brother, Teresa Linn, patient has been feeling sick with shortness of breath, memory issues and visual hallucinations for past several months. For this visit, pt up in bedside chair, on the edge and almost sliding out. I found RN who helped pt back to bed. Described Palliative Medicine to pt, letting him know Palliative Medicine is an additional layer of care specializing in serious illness like his heart problems for relief of pain, symptoms, stress, is for the patient as well as the family, assists with decisions for Goals of Care, and includes a team approach to improve quality of life for all involved. Pt anxious to make \"numerous phone calls\" to people to take care of things before he goes to [facility] tomorrow. Pt verified his only family is his brother, stating he's never been  and has no other siblings. Pt says he has legal papers naming his brother, Brandi Falcon, as his decision-maker, but they're at home. Normally pt uses [employer], and he thinks they have copies of all his documents. Pt did not wish to talk further, saying he has the phone calls to make. I observe pt appears stressed, but pt says, \"No, I'm just tired. \"  Pt apologized for not wanting to visit at this time. Phoned pt's brother, Teresa Linn, who will look for pt's AMD to bring to [facility] tomorrow if he can find it. Resuscitation Status: Full Code   Durable DNR addressed?   [] Yes   [x] No    [] Not Applicable    Advance Care Planning 4/9/2017   Patient's Healthcare Decision Maker is: Legal Next of Carol Ann 68   Primary Decision Maker Name Julita Brown   Primary Decision Maker Phone Number [phone]   Primary Decision Maker Relationship to Patient Sibling   Confirm Advance Directive Yes, not on file   Patient Would Like to Complete Advance Directive No   Does the patient have other document types -       Goals/Plans: If pt is open to another visit, assess support system and coping. Provide support and care during this hospitalization. Communicated visit with Palliative IDT. Thank you for the opportunity to be involved in the care of Devon Sanchez.     Jase Sharp, MSSW, LSW, CCM I    Respecting Choices ® ACP Facilitator   Palliative Medicine I [facility]  [phone]

## 2017-04-10 NOTE — PROGRESS NOTES
Progress Note      4/10/2017 11:43 AM  NAME: Sha Fallon   MRN:  [id number]   Admit Diagnosis: Acute respiratory failure with hypoxia ([address]      Problem List:      1. Acute renal failure - resolved  2. Hyperkalemia -- resolved (was taking too much at home)  3. Chronic systolic heart failure  4. Acute respiratory failure w/ hypoxia  5. Acute encephalopathy  6. Multiple falls recently  7. Ventricular tachycardia s/p multiple ablations  8. Recent upgrade to BiV-ICD 3/1/17 @ [facility] (Dr. Petar Hernandez)  9. Recent cardiogenic shock at [facility] following groin bleed from procedure; required inotropes briefly and PRBC  10. Diabetes  11. Hypertension     Assessment/Plan:      12. Continue with oral lasix  13. Continue mexilitine 150mg BID  14. Continue quinidine 648mg BID  15. Continue amiodarone 200mg dilay  16. Continue Toprol XL 12.5mg daily  17. Continue ASA 81mg  18. Rehab recommended; he is willing to go to a SNF!  19. Follow up with Dr. Petar Hernandez  20. Will sign off  21. Call with questions         [x]       High complexity decision making was performed in this patient at high risk for decompensation with multiple organ involvement. Subjective:     Sha Fallon denies chest pain, dyspnea. Discussed with RN events overnight. Review of Systems:    Symptom Y/N Comments  Symptom Y/N Comments   Fever/Chills N   Chest Pain N    Poor Appetite N   Edema N    Cough N   Abdominal Pain N    Sputum N   Joint Pain N    SOB/HARRIS N   Pruritis/Rash N    Nausea/vomit N   Tolerating PT/OT Y    Diarrhea N   Tolerating Diet Y    Constipation N   Other       Could NOT obtain due to:      Objective:      Physical Exam:    Last 24hrs VS reviewed since prior progress note.  Most recent are:    Visit Vitals    /89    Pulse 86    Temp 98.3 °F (36.8 °C)    Resp 20    Ht 6' 3\" (1.905 m)    Wt 73.5 kg (162 lb 1.6 oz)    SpO2 93%    BMI 20.26 kg/m2       Intake/Output Summary (Last 24 hours) at 04/10/17 1016  Last data filed at 04/10/17 0236   Gross per 24 hour   Intake              240 ml   Output             1025 ml   Net             -785 ml        General Appearance: Well developed, well nourished, alert & oriented x 3,   no acute distress. Ears/Nose/Mouth/Throat: Pupils equal and round, Hearing grossly normal.  Neck: Supple. JVP within normal limits. Carotids good upstrokes, with no bruit. Chest: Lungs clear to auscultation bilaterally. Cardiovascular: Regular rate and rhythm, S1S2 normal, no murmur, rubs, gallops. Abdomen: Soft, non-tender, bowel sounds are active. No organomegaly. Extremities: Trace edema bilaterally. Femoral pulses +2, Distal Pulses +1. Venous stasis, erythematous toes bilaterally  Skin: Warm and dry. Neuro: CN II-XII grossly intact, Strength and sensation grossly intact.       []         Post-cath site without hematoma, bruit, tenderness, or thrill. Distal pulses intact. PMH/ reviewed - no change compared to H&P    Data Review    Telemetry: paced    EKG:   []  No new EKG for review    Lab Data Personally Reviewed:    Recent Labs      04/08/17   1027  04/08/17   0430   WBC  7.1  7.6   HGB  10.0*  10.0*   HCT  31.2*  30.9*   PLT  168  180     No results for input(s): INR, PTP, APTT in the last 72 hours.     No lab exists for component: Hetal Solorzano   Recent Labs      04/10/17   0255  04/09/17   0422  04/08/17   1027   NA  143  145  142  144   K  4.2  4.0  3.9  4.1   CL  104  104  101  105   CO2  31  29  32  31   BUN  26*  30*  33*  39*   CREA  0.83  1.01  0.99  1.04   GLU  81  63*  67  165*   CA  7.6*  8.1*  8.0*  7.8*   MG   --   1.9   --      Recent Labs      04/07/17   1635   CPK  141   CKNDX  2.3   TROIQ  <0.04     No results found for: CHOL, CHOLX, CHLST, CHOLV, HDL, LDL, DLDL, LDLC, DLDLP, TGL, Donnell Lam, CHHD, CHHDX    Recent Labs      04/09/17   0422  04/08/17   1027  04/08/17   0430   04/07/17   7160   SGOT   --   53*  58*   --   75*   AP   --   66  68   --   80 TP   --   5.3*  5.5*   --   6.1*   ALB  2.4*  2.3*  2.6*   < >  2.8*   GLOB   --   3.0  2.9   --   3.3    < > = values in this interval not displayed.      Recent Labs      04/07/17   1900   PH  7.37   PCO2  44   PO2  105*       Medications Personally Reviewed:    Current Facility-Administered Medications   Medication Dose Route Frequency    furosemide (LASIX) injection 20 mg  20 mg IntraVENous DAILY    metoprolol succinate (TOPROL-XL) XL tablet 12.5 mg  12.5 mg Oral DAILY    amiodarone (CORDARONE) tablet 200 mg  200 mg Oral DAILY    aspirin chewable tablet 81 mg  81 mg Oral DAILY    insulin lispro (HUMALOG) injection   SubCUTAneous AC&HS    glucose chewable tablet 16 g  4 Tab Oral PRN    dextrose (D50W) injection syrg 12.5-25 g  12.5-25 g IntraVENous PRN    glucagon (GLUCAGEN) injection 1 mg  1 mg IntraMUSCular PRN    sodium chloride (NS) flush 5-10 mL  5-10 mL IntraVENous Q8H    sodium chloride (NS) flush 5-10 mL  5-10 mL IntraVENous PRN    acetaminophen (TYLENOL) tablet 650 mg  650 mg Oral Q6H PRN    ondansetron (ZOFRAN) injection 4 mg  4 mg IntraVENous Q4H PRN    heparin (porcine) injection 5,000 Units  5,000 Units SubCUTAneous Q8H    mexiletine (MEXITIL) capsule 150 mg  150 mg Oral BID    quiNIDine gluconate SR (DURA-TABS) 324 mg tablet 324 mg  324 mg Oral DAILY         Candice Bee III, DO

## 2017-04-11 VITALS
DIASTOLIC BLOOD PRESSURE: 74 MMHG | OXYGEN SATURATION: 99 % | WEIGHT: 163.14 LBS | BODY MASS INDEX: 20.28 KG/M2 | RESPIRATION RATE: 16 BRPM | HEART RATE: 73 BPM | TEMPERATURE: 98.3 F | SYSTOLIC BLOOD PRESSURE: 111 MMHG | HEIGHT: 75 IN

## 2017-04-11 PROBLEM — R53.81 DEBILITY: Status: ACTIVE | Noted: 2017-04-11

## 2017-04-11 PROBLEM — Z71.89 ACP (ADVANCE CARE PLANNING): Status: ACTIVE | Noted: 2017-04-11

## 2017-04-11 PROBLEM — R45.1 AGITATION: Status: ACTIVE | Noted: 2017-04-11

## 2017-04-11 PROBLEM — R06.02 SHORTNESS OF BREATH: Status: ACTIVE | Noted: 2017-04-11

## 2017-04-11 LAB
GLUCOSE BLD STRIP.AUTO-MCNC: 144 MG/DL (ref 65–100)
GLUCOSE BLD STRIP.AUTO-MCNC: 355 MG/DL (ref 65–100)
SERVICE CMNT-IMP: ABNORMAL
SERVICE CMNT-IMP: ABNORMAL

## 2017-04-11 PROCEDURE — 74011250637 HC RX REV CODE- 250/637: Performed by: INTERNAL MEDICINE

## 2017-04-11 PROCEDURE — 74011636637 HC RX REV CODE- 636/637: Performed by: INTERNAL MEDICINE

## 2017-04-11 PROCEDURE — 82962 GLUCOSE BLOOD TEST: CPT

## 2017-04-11 PROCEDURE — 74011250636 HC RX REV CODE- 250/636: Performed by: INTERNAL MEDICINE

## 2017-04-11 RX ORDER — METOPROLOL SUCCINATE 25 MG/1
12.5 TABLET, EXTENDED RELEASE ORAL DAILY
Qty: 30 TAB | Refills: 0 | Status: SHIPPED
Start: 2017-04-11

## 2017-04-11 RX ORDER — INSULIN LISPRO 100 [IU]/ML
10 INJECTION, SOLUTION INTRAVENOUS; SUBCUTANEOUS ONCE
Status: COMPLETED | OUTPATIENT
Start: 2017-04-11 | End: 2017-04-11

## 2017-04-11 RX ORDER — QUINIDINE GLUCONATE 324 MG/1
648 TABLET, EXTENDED RELEASE ORAL 2 TIMES DAILY
Qty: 60 TAB | Refills: 0 | Status: SHIPPED
Start: 2017-04-11

## 2017-04-11 RX ORDER — QUINIDINE GLUCONATE 324 MG/1
324 TABLET, EXTENDED RELEASE ORAL ONCE
Status: COMPLETED | OUTPATIENT
Start: 2017-04-11 | End: 2017-04-11

## 2017-04-11 RX ADMIN — Medication 10 ML: at 05:15

## 2017-04-11 RX ADMIN — ASPIRIN 81 MG CHEWABLE TABLET 81 MG: 81 TABLET CHEWABLE at 09:31

## 2017-04-11 RX ADMIN — AMIODARONE HYDROCHLORIDE 200 MG: 200 TABLET ORAL at 09:31

## 2017-04-11 RX ADMIN — QUINIDINE GLUCONATE 324 MG: 324 TABLET, EXTENDED RELEASE ORAL at 12:11

## 2017-04-11 RX ADMIN — INSULIN LISPRO 2 UNITS: 100 INJECTION, SOLUTION INTRAVENOUS; SUBCUTANEOUS at 09:29

## 2017-04-11 RX ADMIN — INSULIN LISPRO 10 UNITS: 100 INJECTION, SOLUTION INTRAVENOUS; SUBCUTANEOUS at 12:33

## 2017-04-11 RX ADMIN — QUINIDINE GLUCONATE 324 MG: 324 TABLET, EXTENDED RELEASE ORAL at 09:30

## 2017-04-11 RX ADMIN — HEPARIN SODIUM 5000 UNITS: 5000 INJECTION, SOLUTION INTRAVENOUS; SUBCUTANEOUS at 05:15

## 2017-04-11 RX ADMIN — MEXILETINE HYDROCHLORIDE 150 MG: 150 CAPSULE ORAL at 12:11

## 2017-04-11 RX ADMIN — FUROSEMIDE 20 MG: 20 TABLET ORAL at 09:31

## 2017-04-11 RX ADMIN — METOPROLOL SUCCINATE 12.5 MG: 25 TABLET, EXTENDED RELEASE ORAL at 09:30

## 2017-04-11 NOTE — DISCHARGE SUMMARY
Discharge Summary    Name: Truman Castillo  [id number]  YOB: 1945 (Age: 66 y.o.)   Date of Admission: 4/7/2017  Date of Discharge: 4/11/2017  Attending Physician: Aimee Dunn MD    Discharge Diagnosis:   LANDEN with baseline CKD3, resolved  Hyperkalemia  Acute respiratory failure with hypoxia POA  Due to Systolic CHF, acute on chronic (EF 20-25%) s/p AICD  VTach  HTN  Acute Encephalopathy POA with possible baseline dementia     Consultants: Cardiology and Nephrology    Procedures: none    Brief Admission History/Reason for Admission Per Fabiola Johnston MD:   Kris Mcnair is a 66 y.o.  male who presents to ED because [facility] for [facility] as Found Slumped over the chair. Per brother pt had AICD placement few weeks ago since then patient is been feeling sick and shortness of breath. Brother also reported memory disturbance and visual hallucinations for past several months. Pt is currently confuse and unable to provide any meaningful history so history is limited. In ED pt noted to have hyperkalemia, Hypoxia, Cr worse then his baseline and CXR with unilateral pleural effusion.    We were asked to admit for work up    [address] Problems:   Acute respiratory failure with hypoxia POA  Due to Systolic CHF, acute on chronic (EF 20-25%) s/p AICD  VTach  HTN  Patient initially required dobutamin gtt and was diuresed with IV lasix. He has since been weaned off dobutamine gtt and lasix has transitioned to PO lasix 20mg daily. He presented with  CXR with left pleural effusion, probnp ~11,000 with +2 pitting edema. Echo with EF 20-25%, with diffuse hypokinesis and RV vol overload. He had remained HD stable and all medications will be resumed on discharged in addition to PO lasix. He will need strict I&O, daily wt.   Continue with lasix 20mg daily  Continue mexilitine 150mg BID  Continue quinidine 648mg BID  Continue amiodarone 200mg daily  Continue Toprol XL 12.5mg daily  Continue ASA 81mg    LANDEN with baseline CKD3, resolved. No ACEI/arbs until cr is stabilized. Hyperkalemia, resolved  -pt admits to taking excessive K suppl. Resolved with Kayexalate, Insulin/Dextrose, Calcium Gluconate.      Acute Encephalopathy POA with possible baseline dementia   -hx of hallucination for months, mentation back to baseline         Discharge Exam:  Patient seen and examined by me on discharge day. Pertinent Findings:  Visit Vitals    /70 (BP 1 Location: Left arm)    Pulse 89    Temp 97.6 °F (36.4 °C)    Resp 16    Ht 6' 3\" (1.905 m)    Wt 74 kg (163 lb 2.3 oz)    SpO2 91%    BMI 20.39 kg/m2     Gen:    Not in distress  Chest: Clear lungs  CVS:   Regular rhythm. No edema  Abd:  Soft, not distended, not tender    Discharge/Recent Laboratory Results:  Recent Labs      04/10/17   0255  04/09/17   0422   NA  143  145  142   K  4.2  4.0  3.9   CL  104  104  101   CO2  31  29  32   BUN  26*  30*  33*   GLU  81  63*  67   CA  7.6*  8.1*  8.0*   PHOS   --   3.4   MG   --   1.9     No results for input(s): HGB, HCT, WBC, PLT, HGBEXT, HCTEXT, PLTEXT, HGBEXT, HCTEXT, PLTEXT in the last 72 hours. Discharge Medications:  Current Discharge Medication List      CONTINUE these medications which have CHANGED    Details   quiNIDine gluconate SR (DURA-TABS) 324 mg SR tablet Take 2 Tabs by mouth two (2) times a day. Qty: 60 Tab, Refills: 0      metoprolol succinate (TOPROL-XL) 25 mg XL tablet Take 0.5 Tabs by mouth daily. Qty: 30 Tab, Refills: 0         CONTINUE these medications which have NOT CHANGED    Details   metFORMIN (GLUCOPHAGE) 1,000 mg tablet Take 1,000 mg by mouth two (2) times daily (with meals). omega 3-dha-epa-fish oil 360-1,200 mg cpDR Take 2,400 mg by mouth three (3) times daily. amiodarone (CORDARONE) 200 mg tablet Take 200 mg by mouth daily.       ascorbic acid, vitamin C, 1,500 mg TbER Take 1,500 mg by mouth two (2) times a day. aspirin 81 mg chewable tablet Take 81 mg by mouth daily. calcium-cholecalciferol, d3, (CALCIUM 600 + D) 600-125 mg-unit tab Take 1 Tab by mouth daily. furosemide (LASIX) 20 mg tablet Take 20 mg by mouth daily. glimepiride (AMARYL) 4 mg tablet Take 4 mg by mouth every morning. mexiletine (MEXITIL) 150 mg capsule Take 150 mg by mouth two (2) times a day. multivitamin (ONE A DAY) tablet Take 1 Tab by mouth daily. SITagliptin (JANUVIA) 100 mg tablet Take 100 mg by mouth daily. UBIQUINONE PO Take 300 mg by mouth daily. vitamin E (AQUA GEMS) 400 unit capsule Take 400 Units by mouth daily. GLUCOSAM/GLUC PEREZ/BZ-NYD-O-GLUC (GLUCOSAMINE COMPLEX PO) Take 1 Tab by mouth daily.              DISPOSITION:    Home with Family:    Home with HH/PT/OT/RN:    SNF/LTC: x   CANDE:    OTHER:          Follow up with:   PCP : Vanesa Heath NP  Follow-up Information     Follow up With Details Comments Contact Info    Alejandro Chin MD Schedule an appointment as soon as possible for a visit in 2 weeks  [address]      [address] [facility])  you are being discharged here for transition of care [address] [address] [address] [facility], NP   [address] [phone]            Diet:  cardiac    Total time in minutes spent coordinating this discharge (includes going over instructions, follow-up, prescriptions, and preparing report for sign off to her PCP) :  35 minutes

## 2017-04-11 NOTE — CONSULTS
Palliative Medicine Consult  [place]: [phone] ([phone])    Patient Name: Truman Castillo  YOB: 1945    Date of Initial Consult: 4/11/17  Reason for Consult: ACP  Requesting Provider: Bubba Holley   Primary Care Physician: Lorena Dhaliwal NP      SUMMARY:   Truman Castillo is a 66 y.o. with a past history of CKD3, CHF with EF 20-25%, AICD, who was admitted on 4/7/2017 from home where the home health nurse found patient slumped over in the chair with a diagnosis of acute on chronic systolic heart failure, acute encephalopathy. Current medical issues leading to Palliative Medicine involvement include: need for care decisions. Per patient's Sharan Toshia (brother) patient has been having memory problems, hallucinations, etc.   PALLIATIVE DIAGNOSES:   1. Dyspnea  2. Fatigue  3. debility       PLAN:   1. Patient quite restless, getting ready to leave. 2. AMD- wants brother Nicole Morel as [place], says he has papers at home. Nicole Morel is his legal NOK. Patient was never , no children. 3. Code status- discussed in simple terms. He kept repeating \"do everything to re start my heart\". 4. I would prefer to have this conversation with his brother present but that is not possible at this time. I also worry about his returning home after rehab stay given his restless mental status and non engagement. Brother has voiced concerns about that as well. 5. Encourage outpatient pcp follow up on goals of care and living situation. 6. Initial consult note routed to primary continuity provider  7.  Communicated plan of care with: Palliative IDT       GOALS OF CARE / TREATMENT PREFERENCES:   [====Goals of Care====]  GOALS OF CARE:  Patient / health care proxy stated goals: FULL      TREATMENT PREFERENCES:   Code Status: Full Code    Advance Care Planning:  Advance Care Planning 4/9/2017   Patient's Healthcare Decision Maker is: Legal Next of T[address] 68   Primary Decision Maker Name Vivian Sy   Primary Decision Maker Phone Number [phone]   Primary Decision Maker Relationship to Patient Sibling   Confirm Advance Directive Yes, not on file   Patient Would Like to Complete Advance Directive No   Does the patient have other document types -       Other:    The palliative care team has discussed with patient / health care proxy about goals of care / treatment preferences for patient.  [====Goals of Care====]         HISTORY:         HPI/SUBJECTIVE:    The patient is:   [x] Verbal and participatory  [] Non-participatory due to:   Restless, does not want to provide any history. Clinical Pain Assessment (nonverbal scale for severity on nonverbal patients):   [++++ Clinical Pain Assessment++++]  [++++Pain Severity++++]:    [++++Pain Character++++]:   [++++Pain Duration++++]:   [++++Pain Effect++++]:   [++++Pain Factors++++]:   [++++Pain Frequency++++]:   [++++Pain Location++++]:   [++++ Clinical Pain Assessment++++]     FUNCTIONAL ASSESSMENT:     Palliative Performance Scale (PPS):          PSYCHOSOCIAL/SPIRITUAL SCREENING:     Advance Care Planning:  Advance Care Planning 4/9/2017   Patient's Healthcare Decision Maker is: Legal Next of Carol Ann Alonso   Primary Decision Maker Name Alejandro Roberts   Primary Decision Maker Phone Number [phone]   Primary Decision Maker Relationship to Patient Sibling   Confirm Advance Directive Yes, not on file   Patient Would Like to Complete Advance Directive No   Does the patient have other document types -        Any spiritual / Yazidi concerns:  [] Yes /  [] No    Caregiver Burnout:  [] Yes /  [] No /  [] No Caregiver Present      Anticipatory grief assessment:   [] Normal  / [] Maladaptive       ESAS Anxiety:      ESAS Depression:          REVIEW OF SYSTEMS:     Positive and pertinent negative findings in ROS are noted above in HPI. The following systems were [] reviewed / [] unable to be reviewed as noted in HPI  Other findings are noted below.   Systems: constitutional, ears/nose/mouth/throat, respiratory, gastrointestinal, genitourinary, musculoskeletal, integumentary, neurologic, psychiatric, endocrine. Positive findings noted below. Modified ESAS Completed by: provider                                   Stool Occurrence(s): 1        PHYSICAL EXAM:     From RN flowsheet:  Wt Readings from Last 3 Encounters:   04/11/17 163 lb 2.3 oz (74 kg)   03/17/17 156 lb 9.6 oz (71 kg)     Blood pressure 124/70, pulse 89, temperature 97.6 °F (36.4 °C), resp. rate 16, height 6' 3\" (1.905 m), weight 163 lb 2.3 oz (74 kg), SpO2 91 %. Pain Scale 1: Numeric (0 - 10)  Pain Intensity 1: 0                 Last bowel movement, if known:     Constitutional: alert, oriented  Eyes: pupils equal, anicteric  ENMT: no nasal discharge, moist mucous membranes  Cardiovascular: regular rhythm, distal pulses intact  Respiratory: breathing not labored, symmetric  Gastrointestinal: soft non-tender, +bowel sounds  Musculoskeletal: no deformity, no tenderness to palpation  Skin: warm, dry  Neurologic: following commands, moving all extremities  Psychiatric:restless  Other:       HISTORY:     Active Problems:    Acute respiratory failure with hypoxia (HCC) (6/4/1751)      Systolic CHF, acute on chronic (HCC) (4/7/2017)      LANDEN (acute kidney injury) ([address] (4/7/2017)      HTN (hypertension) (4/7/2017)      V-tach ([address] (4/7/2017)      AICD (automatic cardioverter/defibrillator) present (4/7/2017)      Past Medical History:   Diagnosis Date    Asthma     COPD    CAD (coronary artery disease)     AICD implanted March 2017    Diabetes ([address]     Heart failure ([address]       Past Surgical History:   Procedure Laterality Date    HX PACEMAKER        History reviewed. No pertinent family history. History reviewed, no pertinent family history.   Social History   Substance Use Topics    Smoking status: Former Smoker    Smokeless tobacco: Not on file    Alcohol use No     No Known Allergies   Current Facility-Administered Medications   Medication Dose Route Frequency    furosemide (LASIX) tablet 20 mg  20 mg Oral DAILY    metoprolol succinate (TOPROL-XL) XL tablet 12.5 mg  12.5 mg Oral DAILY    amiodarone (CORDARONE) tablet 200 mg  200 mg Oral DAILY    aspirin chewable tablet 81 mg  81 mg Oral DAILY    insulin lispro (HUMALOG) injection   SubCUTAneous AC&HS    glucose chewable tablet 16 g  4 Tab Oral PRN    dextrose (D50W) injection syrg 12.5-25 g  12.5-25 g IntraVENous PRN    glucagon (GLUCAGEN) injection 1 mg  1 mg IntraMUSCular PRN    sodium chloride (NS) flush 5-10 mL  5-10 mL IntraVENous Q8H    sodium chloride (NS) flush 5-10 mL  5-10 mL IntraVENous PRN    acetaminophen (TYLENOL) tablet 650 mg  650 mg Oral Q6H PRN    ondansetron (ZOFRAN) injection 4 mg  4 mg IntraVENous Q4H PRN    heparin (porcine) injection 5,000 Units  5,000 Units SubCUTAneous Q8H    mexiletine (MEXITIL) capsule 150 mg  150 mg Oral BID    quiNIDine gluconate SR (DURA-TABS) 324 mg tablet 324 mg  324 mg Oral DAILY          LAB AND IMAGING FINDINGS:     Lab Results   Component Value Date/Time    WBC 7.1 04/08/2017 10:27 AM    HGB 10.0 04/08/2017 10:27 AM    PLATELET 992 97/84/6405 10:27 AM     Lab Results   Component Value Date/Time    Sodium 143 04/10/2017 02:55 AM    Potassium 4.2 04/10/2017 02:55 AM    Chloride 104 04/10/2017 02:55 AM    CO2 31 04/10/2017 02:55 AM    BUN 26 04/10/2017 02:55 AM    Creatinine 0.83 04/10/2017 02:55 AM    Calcium 7.6 04/10/2017 02:55 AM    Magnesium 1.9 04/09/2017 04:22 AM    Phosphorus 3.4 04/09/2017 04:22 AM      Lab Results   Component Value Date/Time    AST (SGOT) 53 04/08/2017 10:27 AM    Alk.  phosphatase 66 04/08/2017 10:27 AM    Protein, total 5.3 04/08/2017 10:27 AM    Albumin 2.4 04/09/2017 04:22 AM    Globulin 3.0 04/08/2017 10:27 AM    GGT 32 03/14/2017 05:55 PM     Lab Results   Component Value Date/Time    INR 1.2 03/13/2017 05:58 PM    Prothrombin time 12.3 03/13/2017 05:58 PM aPTT 27.3 03/13/2017 05:58 PM      No results found for: IRON, FE, TIBC, IBCT, PSAT, FERR   Lab Results   Component Value Date/Time    pH 7.37 04/07/2017 07:00 PM    PCO2 44 04/07/2017 07:00 PM    PO2 105 04/07/2017 07:00 PM     No components found for: [place]   Lab Results   Component Value Date/Time     04/07/2017 04:35 PM    CK - MB 3.2 04/07/2017 04:35 PM                Total time: 70m  Counseling / coordination time:   > 50% counseling / coordination?:     Prolonged service was provided for  []30 min   []75 min in face to face time in the presence of the patient. Time Start:   Time End:   Note: this can only be billed with 32716 (initial) or 92563 (follow up). If multiple start / stop times, list each separately.

## 2017-04-11 NOTE — PROGRESS NOTES
Pharmacist Discharge Medication Reconciliation    Significant PMH:   Past Medical History:   Diagnosis Date    Asthma     COPD    CAD (coronary artery disease)     AICD implanted March 2017    [facility]     Heart failure [facility])      Chief Complaint for this Admission:   Chief Complaint   Patient presents with    Fatigue     Pt arrives via EMS. Pt found by EMS slumped in chair hypotensive with crackles in lungs. Pt c/o fatigue and fall x 2 in last day. Pt states his knees gave out and he fell.  Fall    Hypotension     Allergies: Review of patient's allergies indicates no known allergies. Discharge Medications:   Current Discharge Medication List        CONTINUE these medications which have CHANGED    Details   quiNIDine gluconate SR (DURA-TABS) 324 mg SR tablet Take 2 Tabs by mouth two (2) times a day. Qty: 60 Tab, Refills: 0      metoprolol succinate (TOPROL-XL) 25 mg XL tablet Take 0.5 Tabs by mouth daily. Qty: 30 Tab, Refills: 0           CONTINUE these medications which have NOT CHANGED    Details   metFORMIN (GLUCOPHAGE) 1,000 mg tablet Take 1,000 mg by mouth two (2) times daily (with meals). omega 3-dha-epa-fish oil 360-1,200 mg cpDR Take 2,400 mg by mouth three (3) times daily. amiodarone (CORDARONE) 200 mg tablet Take 200 mg by mouth daily. ascorbic acid, vitamin C, 1,500 mg TbER Take 1,500 mg by mouth two (2) times a day. aspirin 81 mg chewable tablet Take 81 mg by mouth daily. calcium-cholecalciferol, d3, (CALCIUM 600 + D) 600-125 mg-unit tab Take 1 Tab by mouth daily. furosemide (LASIX) 20 mg tablet Take 20 mg by mouth daily. glimepiride (AMARYL) 4 mg tablet Take 4 mg by mouth every morning. mexiletine (MEXITIL) 150 mg capsule Take 150 mg by mouth two (2) times a day. multivitamin (ONE A DAY) tablet Take 1 Tab by mouth daily. SITagliptin (JANUVIA) 100 mg tablet Take 100 mg by mouth daily. UBIQUINONE PO Take 300 mg by mouth daily. vitamin E (AQUA GEMS) 400 unit capsule Take 400 Units by mouth daily. GLUCOSAM/GLUC PEREZ/MT-ITM-T-GLUC (GLUCOSAMINE COMPLEX PO) Take 1 Tab by mouth daily.              The patient's chart, MAR and AVS were reviewed by Rayray Casey PHARMD.

## 2017-04-11 NOTE — PROGRESS NOTES
Patient to be discharged to [facility] today. MD informed. Rn informed. Patient informed. Patient will be assigned to [facility] room 50B  RN to call report to:  [phone]    Patient will require medical transport due to generalized weakness. Decreased command following, Impulsive, Poor safety awareness  Safety/Judgement: Awareness of environment, Decreased awareness of need for assistance, Lack of insight into deficits. Impaired sitting balance. Fall Risk. [facility] confirmed transport time:  1330. [place] informed. PCS, H&P and Facesheet on chart. Care Management Interventions  PCP Verified by CM:  Yes  Palliative Care Consult (Criteria: CHF and RRAT>21): Yes  Mode of Transport at Discharge: BLS  Transition of Care Consult (CM Consult): SNF ([facility])  Partner SNF: Yes  Discharge Durable Medical Equipment: No  Physical Therapy Consult: Yes  Occupational Therapy Consult: Yes  Current Support Network: Lives Alone  Freedom of Choice Offered: Yes (SNF - [place])  Discharge Location  Discharge Placement: Skilled nursing facility      Asa Hull, [address] ([facility])

## 2017-04-11 NOTE — PROGRESS NOTES
Hospital to [facility] SBAR Handoff - Adolfo Nely                                                                        66 y.o.   male    [address]   Room: [facility]/01    [facility] PROGRESSIVE CARE  Unit Phone# :  [phone]        [address]  [facility] [address] [facility]  [address] [address]  Dept: [phone]  Loc: [phone]                    SITUATION     Admitted:  4/7/2017         Attending Provider:  Kevin Sarmiento MD       Consultations:  IP CONSULT TO CARDIOLOGY  IP CONSULT TO NEPHROLOGY  IP CONSULT TO PALLIATIVE CARE - PROVIDER    PCP:  Sanna Beavers NP   [phone]    Treatment Team: Attending Provider: Kevin Sarmiento MD; Consulting Provider: Michelle Dias MD; Consulting Provider: Marques Yanes MD    Admitting Dx:  Acute respiratory failure with hypoxia [facility])       Principal Problem: <principal problem not specified>    * No surgery found * of      BY: * Surgery not found *             ON: * No surgery found *                  Code Status: Full Code                Advance Directives:   Advance Care Planning 4/9/2017   Patient's Healthcare Decision Maker is: Legal Next of Carol Ann 68   Primary Decision Maker Name Padmini Noguera   Primary Decision Maker Phone Number [phone]   Primary Decision Maker Relationship to Patient Sibling   Confirm Advance Directive Yes, not on file   Patient Would Like to Complete Advance Directive No   Does the patient have other document types -    (Send w/patient)   Yes Not W Pt       Isolation:  There are currently no Active Isolations       MDRO: No current active infections    Pain Medications given:  none      Last dose: at  never    Special Equipment needed: no  Type of equipment:      (Not currently on dialysis)  (Not currently on dialysis)  (Not currently on dialysis)     BACKGROUND     Allergies:  No Known Allergies    Past Medical History:   Diagnosis Date    Asthma     COPD    CAD (coronary artery disease)     AICD implanted March 2017    Diabetes [facility])     Heart failure [facility])        Past Surgical History:   Procedure Laterality Date    HX PACEMAKER         Prescriptions Prior to Admission   Medication Sig    metFORMIN (GLUCOPHAGE) 1,000 mg tablet Take 1,000 mg by mouth two (2) times daily (with meals).  omega 3-dha-epa-fish oil 360-1,200 mg cpDR Take 2,400 mg by mouth three (3) times daily.  amiodarone (CORDARONE) 200 mg tablet Take 200 mg by mouth daily.  ascorbic acid, vitamin C, 1,500 mg TbER Take 1,500 mg by mouth two (2) times a day.  aspirin 81 mg chewable tablet Take 81 mg by mouth daily.  calcium-cholecalciferol, d3, (CALCIUM 600 + D) 600-125 mg-unit tab Take 1 Tab by mouth daily.  furosemide (LASIX) 20 mg tablet Take 20 mg by mouth daily.  glimepiride (AMARYL) 4 mg tablet Take 4 mg by mouth every morning.  mexiletine (MEXITIL) 150 mg capsule Take 150 mg by mouth two (2) times a day.  multivitamin (ONE A DAY) tablet Take 1 Tab by mouth daily.  SITagliptin (JANUVIA) 100 mg tablet Take 100 mg by mouth daily.  UBIQUINONE PO Take 300 mg by mouth daily.  vitamin E (AQUA GEMS) 400 unit capsule Take 400 Units by mouth daily.  GLUCOSAM/GLUC PEREZ/RU-HDZ-E-GLUC (GLUCOSAMINE COMPLEX PO) Take 1 Tab by mouth daily. Hard scripts included in transfer packet no    Vaccinations: There is no immunization history on file for this patient. Readmission Risks:    Known Risks: fall          The Charlson CoMorbitiy Index tool is an evidenced based tool that has more automatic generated information. The tool looks at many different items such as the age of the patient, how many times they were admitted in the last calendar year, current length of stay in the hospital and their diagnosis.  All of these items are pulled automatically from information documented in the chart from various places and will generate a score that predicts whether a patient is at low (less than 13), medium (13-20) or high (21 or greater) risk of being readmitted. ASSESSMENT                Temp: 98.3 °F (36.8 °C) (04/11/17 1220) Pulse (Heart Rate): 73 (04/11/17 1220)     Resp Rate: 16 (04/11/17 1220)           BP: 111/74 (04/11/17 1220)     O2 Sat (%): 99 % (04/11/17 1220)     Weight: 74 kg (163 lb 2.3 oz)    Height: 6' 3\" (190.5 cm) (04/09/17 0402)       If above not within 1 hour of discharge:    BP:_118/63____  P:_72___  R:__16__ T:_98.2____ O2 Sat: _99__%  O2: RA______    Active Orders   Diet    DIET CARDIAC Regular         Orientation: oriented to time, place, person and situation     Active Behaviors: None                                   Active Lines/Drains:  (Peg Tube / Pineda / CL or S/L?): no    Urinary Status: Voiding     Last BM: Last Bowel Movement Date: 04/10/17     Skin Integrity: Intact   Wound Arm Right-DRESSING STATUS: Clean, dry, and intact  Wound Toe Upper-DRESSING STATUS: Clean, dry, and intact  Wound Toe Lateral-DRESSING STATUS: Clean, dry, and intact    Wound Arm Right-DRESSING TYPE: Open to air  Wound Toe Upper-DRESSING TYPE: Open to air  Wound Toe Lateral-DRESSING TYPE: Open to air    Mobility: Very limited   Weight Bearing Status: WBAT (Weight Bearing as Tolerated)      Gait Training  Assistive Device: Gait belt, Walker, rolling  Ambulation - Level of Assistance: Contact guard assistance  Distance (ft): 80 Feet (ft) (40' x 2)         Lab Results   Component Value Date/Time    Glucose 81 04/10/2017 02:55 AM    INR 1.2 03/13/2017 05:58 PM    HGB 10.0 04/08/2017 10:27 AM    HGB 10.0 04/08/2017 04:30 AM        RECOMMENDATION     See After Visit Summary (AVS) for:  · Discharge instructions  · After [address]   · Special equipment needed (entered pre-discharge by Care Management)  · Medication Reconciliation    · Follow up Appointment(s)         Report given/sent by:  Tillar No, RN                    Verbal report given to: Samuel Doyle LPN         Estimated discharge time:  4/11/2017 at 1335

## 2017-04-11 NOTE — PROGRESS NOTES
Bedside shift change report given to Kiana Alvares (oncoming nurse) by Medhat Romero (offgoing nurse). Report included the following information SBAR, Procedure Summary, Intake/Output, MAR, Recent Results and Cardiac Rhythm Lines.

## 2017-04-11 NOTE — DISCHARGE INSTRUCTIONS
[facility] Activation    Thank you for requesting access to [facility]. Please follow the instructions below to securely access and download your online medical record. [facility] allows you to send messages to your doctor, view your test results, renew your prescriptions, schedule appointments, and more. How Do I Sign Up? 1. In your internet browser, go to [email or web address]  2. Click on the First Time User? Click Here link in the Sign In box. You will be redirect to the New Member Sign Up page. 3. Enter your [employer] Access Code exactly as it appears below. You will not need to use this code after youve completed the sign-up process. If you do not sign up before the expiration date, you must request a new code. [employer] Access Code: [email or web address]  Expires: 2017  3:41 PM (This is the date your [employer] access code will )    4. Enter the last four digits of your Social Security Number (xxxx) and Date of Birth (mm/dd/yyyy) as indicated and click Submit. You will be taken to the next sign-up page. 5. Create a [facility] ID. This will be your [facility] login ID and cannot be changed, so think of one that is secure and easy to remember. 6. Create a [facility] password. You can change your password at any time. 7. Enter your Password Reset Question and Answer. This can be used at a later time if you forget your password. 8. Enter your e-mail address. You will receive e-mail notification when new information is available in [address] 9. Click Sign Up. You can now view and download portions of your medical record. 10. Click the Download Summary menu link to download a portable copy of your medical information. Additional Information    If you have questions, please visit the Frequently Asked Questions section of the [employer] website at [email or web address] Remember, [facility] is NOT to be used for urgent needs. For medical emergencies, dial 911.

## 2017-04-12 ENCOUNTER — PATIENT OUTREACH (OUTPATIENT)
Dept: INTERNAL MEDICINE CLINIC | Age: 72
End: 2017-04-12

## 2017-04-12 NOTE — PROGRESS NOTES
Community Care Team Documentation for Patient in [facility]     Patient was admitted to [facility] from 4/7/17 to 4/11/17. Patient was discharged to [address] [place] [address], [facility], on 4/11/17  (date). Hospital Discharge diagnosis:  CHF. Patient had a readmission from SNF r/t CHF. PCP : Sue Melton NP    Information provided by SNF staff member, Cl Bourgeois, is as follows:    SNF Attending Provider:  Dr. Caprice Bolanos  Anticipated discharge date from SNF:  Undetermined  SNF discharge plan:  Patient has only 3 more skilled covered day at 100%. Then transition to 20% copay. Unsure at this time if patient is willing to pay or has resources to pay. Participating in therapy.

## 2017-04-14 ENCOUNTER — PATIENT OUTREACH (OUTPATIENT)
Dept: CASE MANAGEMENT | Age: 72
End: 2017-04-14

## 2017-04-18 ENCOUNTER — PATIENT OUTREACH (OUTPATIENT)
Dept: CASE MANAGEMENT | Age: 72
End: 2017-04-18

## 2017-04-19 ENCOUNTER — PATIENT OUTREACH (OUTPATIENT)
Dept: CASE MANAGEMENT | Age: 72
End: 2017-04-19

## 2017-04-19 ENCOUNTER — PATIENT OUTREACH (OUTPATIENT)
Dept: INTERNAL MEDICINE CLINIC | Age: 72
End: 2017-04-19

## 2017-04-19 NOTE — PROGRESS NOTES
Community Care Team Documentation for Patient in [facility]  Subsequent Follow up     Patient remains at [address] ([facility]). See previous [facility] Team notes. PCP : Jeannie Hastings NP    Information provided today by SNF staff member, Raul Love, is as follows: Anticipated discharge date from SNF:  Undetermined  SNF discharge plan:  Home Barrier:  Medication management: refusing Lasix. Paying copay. No d/c date.      Balta Rosado, MSN, RN, ACNS-BC, [facility]  Nurse Navigator, [employer] [phone]

## 2017-04-19 NOTE — PROGRESS NOTES
Heart Failure Care Coordinator Contacted the patients SNF-[facility] to perform post hospitalization assessment.  Verified  and address as identifiers. Received nursing updates via phone pertaining to patient from [employer]. Will contact patient directly once SNF course complete. Will contact Giulia Costa from [facility] if needs arise. Giulia Costa also aware they can contact HF care coordinator at any time as well. Giulia Costa will fax weekly sheets on Thursday. Stated patients biggest issue is compliance. Giulia Costa stated she had to coax him into taking his diuretics. Per Giulia Costa, patient does while on track.

## 2017-04-26 ENCOUNTER — PATIENT OUTREACH (OUTPATIENT)
Dept: INTERNAL MEDICINE CLINIC | Age: 72
End: 2017-04-26

## 2017-05-02 ENCOUNTER — PATIENT OUTREACH (OUTPATIENT)
Dept: CASE MANAGEMENT | Age: 72
End: 2017-05-02

## 2017-05-05 ENCOUNTER — TELEPHONE (OUTPATIENT)
Dept: INTERNAL MEDICINE CLINIC | Age: 72
End: 2017-05-05

## 2017-05-05 NOTE — TELEPHONE ENCOUNTER
DOUGLASP called to see if she could come provide a HV on patient today and check on him. Left VM and phone number for him to call back, [phone]. Will try back later today if do not hear back and plan for visit next week.  Curtis HOLDEN
